# Patient Record
Sex: MALE | Race: WHITE | NOT HISPANIC OR LATINO | Employment: OTHER | URBAN - METROPOLITAN AREA
[De-identification: names, ages, dates, MRNs, and addresses within clinical notes are randomized per-mention and may not be internally consistent; named-entity substitution may affect disease eponyms.]

---

## 2017-01-17 ENCOUNTER — ALLSCRIPTS OFFICE VISIT (OUTPATIENT)
Dept: OTHER | Facility: OTHER | Age: 77
End: 2017-01-17

## 2017-01-26 ENCOUNTER — ANESTHESIA EVENT (OUTPATIENT)
Dept: PERIOP | Facility: HOSPITAL | Age: 77
End: 2017-01-26
Payer: COMMERCIAL

## 2017-02-02 ENCOUNTER — APPOINTMENT (OUTPATIENT)
Dept: PREADMISSION TESTING | Facility: HOSPITAL | Age: 77
End: 2017-02-02
Payer: COMMERCIAL

## 2017-02-02 ENCOUNTER — TRANSCRIBE ORDERS (OUTPATIENT)
Dept: ADMINISTRATIVE | Facility: HOSPITAL | Age: 77
End: 2017-02-02

## 2017-02-02 ENCOUNTER — APPOINTMENT (OUTPATIENT)
Dept: LAB | Facility: HOSPITAL | Age: 77
End: 2017-02-02
Attending: UROLOGY
Payer: COMMERCIAL

## 2017-02-02 ENCOUNTER — HOSPITAL ENCOUNTER (OUTPATIENT)
Dept: NON INVASIVE DIAGNOSTICS | Facility: HOSPITAL | Age: 77
Discharge: HOME | End: 2017-02-02
Attending: UROLOGY
Payer: COMMERCIAL

## 2017-02-02 VITALS
TEMPERATURE: 96.9 F | DIASTOLIC BLOOD PRESSURE: 83 MMHG | WEIGHT: 205.6 LBS | BODY MASS INDEX: 32.27 KG/M2 | HEIGHT: 67 IN | HEART RATE: 68 BPM | SYSTOLIC BLOOD PRESSURE: 161 MMHG | RESPIRATION RATE: 18 BRPM

## 2017-02-02 DIAGNOSIS — Z01.818 OTHER SPECIFIED PRE-OPERATIVE EXAMINATION: Primary | ICD-10-CM

## 2017-02-02 DIAGNOSIS — N32.89 NONTRAUMATIC RUPTURE OF BLADDER: ICD-10-CM

## 2017-02-02 DIAGNOSIS — Z01.818 OTHER SPECIFIED PRE-OPERATIVE EXAMINATION: ICD-10-CM

## 2017-02-02 LAB
ANION GAP SERPL CALCULATED.3IONS-SCNC: 8 MMOL/L (ref 4–13)
APTT PPP: 25 SECONDS (ref 24–36)
BASOPHILS # BLD AUTO: 0.01 THOUSANDS/ΜL (ref 0–0.1)
BASOPHILS NFR BLD AUTO: 0 % (ref 0–1)
BUN SERPL-MCNC: 19 MG/DL (ref 5–25)
CALCIUM SERPL-MCNC: 10.1 MG/DL (ref 8.3–10.1)
CHLORIDE SERPL-SCNC: 101 MMOL/L (ref 100–108)
CO2 SERPL-SCNC: 27 MMOL/L (ref 21–32)
CREAT SERPL-MCNC: 1.16 MG/DL (ref 0.6–1.3)
EOSINOPHIL # BLD AUTO: 0.07 THOUSAND/ΜL (ref 0–0.61)
EOSINOPHIL NFR BLD AUTO: 1 % (ref 0–6)
ERYTHROCYTE [DISTWIDTH] IN BLOOD BY AUTOMATED COUNT: 12.9 % (ref 11.6–15.1)
GFR SERPL CREATININE-BSD FRML MDRD: >60 ML/MIN/1.73SQ M
GLUCOSE SERPL-MCNC: 119 MG/DL (ref 65–140)
HCT VFR BLD AUTO: 45.9 % (ref 36.5–49.3)
HGB BLD-MCNC: 16.2 G/DL (ref 12–17)
INR PPP: 1.03 (ref 0.86–1.16)
LYMPHOCYTES # BLD AUTO: 1.06 THOUSANDS/ΜL (ref 0.6–4.47)
LYMPHOCYTES NFR BLD AUTO: 20 % (ref 14–44)
MCH RBC QN AUTO: 32.4 PG (ref 26.8–34.3)
MCHC RBC AUTO-ENTMCNC: 35.3 G/DL (ref 31.4–37.4)
MCV RBC AUTO: 92 FL (ref 82–98)
MONOCYTES # BLD AUTO: 0.42 THOUSAND/ΜL (ref 0.17–1.22)
MONOCYTES NFR BLD AUTO: 8 % (ref 4–12)
NEUTROPHILS # BLD AUTO: 3.78 THOUSANDS/ΜL (ref 1.85–7.62)
NEUTS SEG NFR BLD AUTO: 71 % (ref 43–75)
NRBC BLD AUTO-RTO: 0 /100 WBCS
PLATELET # BLD AUTO: 200 THOUSANDS/UL (ref 149–390)
PMV BLD AUTO: 10.9 FL (ref 8.9–12.7)
POTASSIUM SERPL-SCNC: 4.3 MMOL/L (ref 3.5–5.3)
PROTHROMBIN TIME: 13.5 SECONDS (ref 12–14.3)
RBC # BLD AUTO: 5 MILLION/UL (ref 3.88–5.62)
SODIUM SERPL-SCNC: 136 MMOL/L (ref 136–145)
WBC # BLD AUTO: 5.34 THOUSAND/UL (ref 4.31–10.16)

## 2017-02-02 PROCEDURE — 36415 COLL VENOUS BLD VENIPUNCTURE: CPT

## 2017-02-02 PROCEDURE — 85730 THROMBOPLASTIN TIME PARTIAL: CPT

## 2017-02-02 PROCEDURE — 93005 ELECTROCARDIOGRAM TRACING: CPT

## 2017-02-02 PROCEDURE — 85610 PROTHROMBIN TIME: CPT

## 2017-02-02 PROCEDURE — 85025 COMPLETE CBC W/AUTO DIFF WBC: CPT

## 2017-02-02 PROCEDURE — 80048 BASIC METABOLIC PNL TOTAL CA: CPT

## 2017-02-02 RX ORDER — AMLODIPINE BESYLATE 2.5 MG/1
2.5 TABLET ORAL
COMMUNITY
End: 2018-03-08 | Stop reason: SDUPTHER

## 2017-02-02 RX ORDER — BIOTIN 1 MG
1 TABLET ORAL DAILY
COMMUNITY
End: 2018-03-08 | Stop reason: ALTCHOICE

## 2017-02-02 RX ORDER — ACETAMINOPHEN 325 MG/1
650 TABLET ORAL EVERY 6 HOURS PRN
COMMUNITY

## 2017-02-02 RX ORDER — FINASTERIDE 5 MG/1
5 TABLET, FILM COATED ORAL EVERY MORNING
COMMUNITY
End: 2018-07-11 | Stop reason: SDUPTHER

## 2017-02-02 RX ORDER — VALSARTAN AND HYDROCHLOROTHIAZIDE 320; 25 MG/1; MG/1
1 TABLET, FILM COATED ORAL
COMMUNITY
End: 2018-03-08 | Stop reason: SDUPTHER

## 2017-02-02 RX ORDER — NITROGLYCERIN 0.4 MG/1
0.4 TABLET SUBLINGUAL
COMMUNITY
End: 2018-05-02 | Stop reason: SDUPTHER

## 2017-02-02 RX ORDER — TAMSULOSIN HYDROCHLORIDE 0.4 MG/1
0.4 CAPSULE ORAL 2 TIMES DAILY
COMMUNITY
End: 2018-04-02 | Stop reason: SDUPTHER

## 2017-02-02 RX ORDER — SODIUM CHLORIDE 9 MG/ML
125 INJECTION, SOLUTION INTRAVENOUS CONTINUOUS
Status: CANCELLED | OUTPATIENT
Start: 2017-02-02

## 2017-02-02 RX ORDER — ASPIRIN 325 MG
325 TABLET ORAL DAILY
COMMUNITY
End: 2017-02-08 | Stop reason: HOSPADM

## 2017-02-03 LAB
ATRIAL RATE: 63 BPM
P AXIS: 47 DEGREES
PR INTERVAL: 164 MS
QRS AXIS: -55 DEGREES
QRSD INTERVAL: 106 MS
QT INTERVAL: 400 MS
QTC INTERVAL: 409 MS
T WAVE AXIS: -6 DEGREES
VENTRICULAR RATE: 63 BPM

## 2017-02-08 ENCOUNTER — ANESTHESIA (OUTPATIENT)
Dept: PERIOP | Facility: HOSPITAL | Age: 77
End: 2017-02-08
Payer: COMMERCIAL

## 2017-02-08 ENCOUNTER — HOSPITAL ENCOUNTER (OUTPATIENT)
Facility: HOSPITAL | Age: 77
Setting detail: OUTPATIENT SURGERY
Discharge: HOME | End: 2017-02-08
Attending: UROLOGY | Admitting: UROLOGY
Payer: COMMERCIAL

## 2017-02-08 VITALS
RESPIRATION RATE: 16 BRPM | OXYGEN SATURATION: 97 % | HEIGHT: 67 IN | SYSTOLIC BLOOD PRESSURE: 132 MMHG | WEIGHT: 205 LBS | DIASTOLIC BLOOD PRESSURE: 68 MMHG | BODY MASS INDEX: 32.18 KG/M2 | HEART RATE: 60 BPM | TEMPERATURE: 97.8 F

## 2017-02-08 DIAGNOSIS — N32.89 OTHER SPECIFIED DISORDERS OF BLADDER: ICD-10-CM

## 2017-02-08 PROCEDURE — 88305 TISSUE EXAM BY PATHOLOGIST: CPT | Performed by: UROLOGY

## 2017-02-08 PROCEDURE — A9270 NON-COVERED ITEM OR SERVICE: HCPCS | Performed by: UROLOGY

## 2017-02-08 RX ORDER — SODIUM CHLORIDE 9 MG/ML
125 INJECTION, SOLUTION INTRAVENOUS CONTINUOUS
Status: DISCONTINUED | OUTPATIENT
Start: 2017-02-08 | End: 2017-02-08 | Stop reason: HOSPADM

## 2017-02-08 RX ORDER — MITOMYCIN 40 MG/80ML
40 INJECTION, POWDER, LYOPHILIZED, FOR SOLUTION INTRAVENOUS ONCE
Status: DISCONTINUED | OUTPATIENT
Start: 2017-02-08 | End: 2017-02-08

## 2017-02-08 RX ORDER — MAGNESIUM HYDROXIDE 1200 MG/15ML
LIQUID ORAL AS NEEDED
Status: DISCONTINUED | OUTPATIENT
Start: 2017-02-08 | End: 2017-02-08 | Stop reason: HOSPADM

## 2017-02-08 RX ORDER — FENTANYL CITRATE 50 UG/ML
INJECTION, SOLUTION INTRAMUSCULAR; INTRAVENOUS AS NEEDED
Status: DISCONTINUED | OUTPATIENT
Start: 2017-02-08 | End: 2017-02-08 | Stop reason: SURG

## 2017-02-08 RX ORDER — TAMSULOSIN HYDROCHLORIDE 0.4 MG/1
0.4 CAPSULE ORAL ONCE
Status: COMPLETED | OUTPATIENT
Start: 2017-02-08 | End: 2017-02-08

## 2017-02-08 RX ORDER — ONDANSETRON 2 MG/ML
INJECTION INTRAMUSCULAR; INTRAVENOUS AS NEEDED
Status: DISCONTINUED | OUTPATIENT
Start: 2017-02-08 | End: 2017-02-08 | Stop reason: SURG

## 2017-02-08 RX ORDER — FENTANYL CITRATE/PF 50 MCG/ML
50 SYRINGE (ML) INJECTION
Status: DISCONTINUED | OUTPATIENT
Start: 2017-02-08 | End: 2017-02-08 | Stop reason: HOSPADM

## 2017-02-08 RX ORDER — PHENAZOPYRIDINE HYDROCHLORIDE 100 MG/1
100 TABLET, FILM COATED ORAL 3 TIMES DAILY PRN
Qty: 10 TABLET | Refills: 0 | Status: SHIPPED | OUTPATIENT
Start: 2017-02-08 | End: 2018-03-08 | Stop reason: CLARIF

## 2017-02-08 RX ORDER — LIDOCAINE HYDROCHLORIDE 10 MG/ML
INJECTION, SOLUTION INFILTRATION; PERINEURAL AS NEEDED
Status: DISCONTINUED | OUTPATIENT
Start: 2017-02-08 | End: 2017-02-08 | Stop reason: SURG

## 2017-02-08 RX ORDER — PROPOFOL 10 MG/ML
INJECTION, EMULSION INTRAVENOUS AS NEEDED
Status: DISCONTINUED | OUTPATIENT
Start: 2017-02-08 | End: 2017-02-08 | Stop reason: SURG

## 2017-02-08 RX ORDER — MIDAZOLAM HYDROCHLORIDE 1 MG/ML
INJECTION INTRAMUSCULAR; INTRAVENOUS AS NEEDED
Status: DISCONTINUED | OUTPATIENT
Start: 2017-02-08 | End: 2017-02-08 | Stop reason: SURG

## 2017-02-08 RX ORDER — PHENAZOPYRIDINE HYDROCHLORIDE 100 MG/1
100 TABLET, FILM COATED ORAL ONCE
Status: COMPLETED | OUTPATIENT
Start: 2017-02-08 | End: 2017-02-08

## 2017-02-08 RX ORDER — MITOMYCIN 40 MG/80ML
40 INJECTION, POWDER, LYOPHILIZED, FOR SOLUTION INTRAVENOUS ONCE
Status: COMPLETED | OUTPATIENT
Start: 2017-02-08 | End: 2017-02-08

## 2017-02-08 RX ORDER — GLYCINE 1.5 G/100ML
SOLUTION IRRIGATION AS NEEDED
Status: DISCONTINUED | OUTPATIENT
Start: 2017-02-08 | End: 2017-02-08 | Stop reason: HOSPADM

## 2017-02-08 RX ORDER — ONDANSETRON 2 MG/ML
4 INJECTION INTRAMUSCULAR; INTRAVENOUS ONCE
Status: DISCONTINUED | OUTPATIENT
Start: 2017-02-08 | End: 2017-02-08 | Stop reason: HOSPADM

## 2017-02-08 RX ADMIN — TAMSULOSIN HYDROCHLORIDE 0.4 MG: 0.4 CAPSULE ORAL at 12:37

## 2017-02-08 RX ADMIN — PHENAZOPYRIDINE HYDROCHLORIDE 100 MG: 100 TABLET ORAL at 12:37

## 2017-02-08 RX ADMIN — FENTANYL CITRATE 25 MCG: 50 INJECTION, SOLUTION INTRAMUSCULAR; INTRAVENOUS at 10:19

## 2017-02-08 RX ADMIN — FENTANYL CITRATE 25 MCG: 50 INJECTION, SOLUTION INTRAMUSCULAR; INTRAVENOUS at 10:22

## 2017-02-08 RX ADMIN — CEFAZOLIN SODIUM 2000 MG: 2 SOLUTION INTRAVENOUS at 10:13

## 2017-02-08 RX ADMIN — PROPOFOL 150 MG: 10 INJECTION, EMULSION INTRAVENOUS at 10:19

## 2017-02-08 RX ADMIN — ONDANSETRON HYDROCHLORIDE 4 MG: 2 INJECTION, SOLUTION INTRAVENOUS at 10:40

## 2017-02-08 RX ADMIN — LIDOCAINE HYDROCHLORIDE 60 MG: 10 INJECTION, SOLUTION INFILTRATION; PERINEURAL at 10:13

## 2017-02-08 RX ADMIN — MIDAZOLAM HYDROCHLORIDE 2 MG: 1 INJECTION, SOLUTION INTRAMUSCULAR; INTRAVENOUS at 10:12

## 2017-02-08 RX ADMIN — SODIUM CHLORIDE 125 ML/HR: 0.9 INJECTION, SOLUTION INTRAVENOUS at 09:22

## 2017-02-08 RX ADMIN — SODIUM CHLORIDE 125 ML/HR: 0.9 INJECTION, SOLUTION INTRAVENOUS at 11:11

## 2017-02-24 ENCOUNTER — ALLSCRIPTS OFFICE VISIT (OUTPATIENT)
Dept: OTHER | Facility: OTHER | Age: 77
End: 2017-02-24

## 2017-05-17 ENCOUNTER — GENERIC CONVERSION - ENCOUNTER (OUTPATIENT)
Dept: OTHER | Facility: OTHER | Age: 77
End: 2017-05-17

## 2017-07-19 ENCOUNTER — ALLSCRIPTS OFFICE VISIT (OUTPATIENT)
Dept: OTHER | Facility: OTHER | Age: 77
End: 2017-07-19

## 2017-08-01 ENCOUNTER — LAB CONVERSION - ENCOUNTER (OUTPATIENT)
Dept: OTHER | Facility: OTHER | Age: 77
End: 2017-08-01

## 2017-08-01 ENCOUNTER — GENERIC CONVERSION - ENCOUNTER (OUTPATIENT)
Dept: OTHER | Facility: OTHER | Age: 77
End: 2017-08-01

## 2017-08-01 LAB
A/G RATIO (HISTORICAL): 1.7 (CALC) (ref 1–2.5)
ALBUMIN SERPL BCP-MCNC: 3.9 G/DL (ref 3.6–5.1)
ALP SERPL-CCNC: 38 U/L (ref 40–115)
ALT SERPL W P-5'-P-CCNC: 11 U/L (ref 9–46)
AST SERPL W P-5'-P-CCNC: 11 U/L (ref 10–35)
BASOPHILS # BLD AUTO: 0.6 %
BASOPHILS # BLD AUTO: 28 CELLS/UL (ref 0–200)
BILIRUB SERPL-MCNC: 0.5 MG/DL (ref 0.2–1.2)
BUN SERPL-MCNC: 22 MG/DL (ref 7–25)
BUN/CREA RATIO (HISTORICAL): ABNORMAL (CALC) (ref 6–22)
CALCIUM SERPL-MCNC: 9.8 MG/DL (ref 8.6–10.3)
CHLORIDE SERPL-SCNC: 105 MMOL/L (ref 98–110)
CHOLEST SERPL-MCNC: 169 MG/DL (ref 125–200)
CHOLEST/HDLC SERPL: 3.4 (CALC)
CO2 SERPL-SCNC: 28 MMOL/L (ref 20–31)
CREAT SERPL-MCNC: 1.13 MG/DL (ref 0.7–1.18)
DEPRECATED RDW RBC AUTO: 12.6 % (ref 11–15)
EGFR AFRICAN AMERICAN (HISTORICAL): 73 ML/MIN/1.73M2
EGFR-AMERICAN CALC (HISTORICAL): 63 ML/MIN/1.73M2
EOSINOPHIL # BLD AUTO: 1.3 %
EOSINOPHIL # BLD AUTO: 61 CELLS/UL (ref 15–500)
GAMMA GLOBULIN (HISTORICAL): 2.3 G/DL (CALC) (ref 1.9–3.7)
GLUCOSE (HISTORICAL): 108 MG/DL (ref 65–99)
HBA1C MFR BLD HPLC: 5.7 % OF TOTAL HGB
HCT VFR BLD AUTO: 46.1 % (ref 38.5–50)
HDLC SERPL-MCNC: 49 MG/DL
HGB BLD-MCNC: 15.4 G/DL (ref 13.2–17.1)
LDL CHOLESTEROL (HISTORICAL): 104 MG/DL (CALC)
LYMPHOCYTES # BLD AUTO: 1025 CELLS/UL (ref 850–3900)
LYMPHOCYTES # BLD AUTO: 21.8 %
MCH RBC QN AUTO: 31.4 PG (ref 27–33)
MCHC RBC AUTO-ENTMCNC: 33.4 G/DL (ref 32–36)
MCV RBC AUTO: 94.1 FL (ref 80–100)
MONOCYTES # BLD AUTO: 381 CELLS/UL (ref 200–950)
MONOCYTES (HISTORICAL): 8.1 %
NEUTROPHILS # BLD AUTO: 3205 CELLS/UL (ref 1500–7800)
NEUTROPHILS # BLD AUTO: 68.2 %
NON-HDL-CHOL (CHOL-HDL) (HISTORICAL): 120 MG/DL (CALC)
PLATELET # BLD AUTO: 213 THOUSAND/UL (ref 140–400)
PMV BLD AUTO: 10.3 FL (ref 7.5–12.5)
POTASSIUM SERPL-SCNC: 4.4 MMOL/L (ref 3.5–5.3)
PROSTATE SPECIFIC ANTIGEN TOTAL (HISTORICAL): 0.7 NG/ML
RBC # BLD AUTO: 4.9 MILLION/UL (ref 4.2–5.8)
SODIUM SERPL-SCNC: 138 MMOL/L (ref 135–146)
TOTAL PROTEIN (HISTORICAL): 6.2 G/DL (ref 6.1–8.1)
TRIGL SERPL-MCNC: 81 MG/DL
TSH SERPL DL<=0.05 MIU/L-ACNC: 0.96 MIU/L (ref 0.4–4.5)
WBC # BLD AUTO: 4.7 THOUSAND/UL (ref 3.8–10.8)

## 2017-08-30 ENCOUNTER — ALLSCRIPTS OFFICE VISIT (OUTPATIENT)
Dept: OTHER | Facility: OTHER | Age: 77
End: 2017-08-30

## 2017-09-14 ENCOUNTER — GENERIC CONVERSION - ENCOUNTER (OUTPATIENT)
Dept: OTHER | Facility: OTHER | Age: 77
End: 2017-09-14

## 2017-09-14 ENCOUNTER — ALLSCRIPTS OFFICE VISIT (OUTPATIENT)
Dept: OTHER | Facility: OTHER | Age: 77
End: 2017-09-14

## 2017-11-21 ENCOUNTER — ALLSCRIPTS OFFICE VISIT (OUTPATIENT)
Dept: OTHER | Facility: OTHER | Age: 77
End: 2017-11-21

## 2017-11-22 NOTE — PROGRESS NOTES
Assessment    1  Right ear pain (388 70) (H92 01)   2  Benign essential hypertension (401 1) (I10)    Plan  Right ear pain    · Amoxicillin 875 MG Oral Tablet; TAKE 1 TABLET EVERY 12 HOURS DAILY   · Fluticasone Propionate 50 MCG/ACT Nasal Suspension; 2 sprays each nostril daily    Discussion/Summary    78y/o male here to for right ear pain, chronic sinus pressure  right ear exam limited with thin sheet of dried cerumen but questionable infection with visible inferior border  I will place patient on amoxicillin antibiotics for 10 days at 875 mg b i d  Unfortunately we were a little restricted with what we can treat with for inner ear pressure as he has hypertension and I wish to avoid decongestants and we need to avoid antihistamines with his prostate/urinary issues  I will have him start fluticasone nasal spray daily and see how he does  Tylenol as needed  He can try using a few drops of 1-1 ratio peroxide/warm water in his right ear to loosen the small amount of cerumen but nothing is to go in his ear otherwise  He is to follow up in a week or so for recheck if symptoms persist   blood pressure is a little elevated today at 160/90 which is uncharacteristic for him  He states he forgot to take his blood pressure medication  He does have a cuff at home and states that he will check pressures and call if it remains elevated otherwise he has an appointment in 3 weeks with Cardiology  Possible side effects of new medications were reviewed with the patient/guardian today  The treatment plan was reviewed with the patient/guardian  The patient/guardian understands and agrees with the treatment plan      Chief Complaint  pt complains of some right ear discomfort that started Thursday of last week  States he was pulling weeds and some dirt get into his ear  History of Present Illness  HPI: 78y/o male here today for right ear pain for past week  states was blowing leaves and thinks something got in there   no hearing change  denies URI sxs but states always has sinus problems/congestion/pressure  Review of Systems   Constitutional: no fever or chills, feels well, no tiredness, no recent weight loss or weight gain,-- no fever-- and-- no chills  ENT: as noted in HPI  Neurological: no complaints of headache, no confusion, no numbness or tingling, no dizziness or fainting  Active Problems  1  Arteriosclerosis of coronary artery (414 00) (I25 10)   2  Benign essential hypertension (401 1) (I10)   3  Benign prostatic hypertrophy (600 00) (N40 0)   4  Bladder calculus (594 1) (N21 0)   5  Bladder mass (596 89) (N32 89)   6  DM2 (diabetes mellitus, type 2) (250 00) (E11 9)   7  Hyperlipidemia (272 4) (E78 5)   8  Incomplete emptying of bladder (788 21) (R33 9)   9  Lesion of bladder (596 9) (N32 9)   10  Osteoarthritis of right hip (715 95) (M16 11)   11  Primary insomnia (307 42) (F51 01)   12  Seborrheic keratoses, inflamed (702 11) (L82 0)   13  Symptomatic bradycardia (427 89) (R00 1)   14  Vitamin D deficiency (268 9) (E55 9)    Past Medical History  1  History of Abnormal blood chemistry (790 6) (R79 9)   2  History of Abnormal glucose (790 29) (R73 09)   3  History of Acute knee pain (719 46) (M25 569)   4  History of Acute UTI (599 0) (N39 0)   5  History of Colon cancer screening (V76 51) (Z12 11)   6  History of Depression screening (V79 0) (Z13 89)   7  History of Encounter for prostate cancer screening (V76 44) (Z12 5)   8  History of Encounter for prostate cancer screening (V76 44) (Z12 5)   9  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)   10  History of Fatigue (780 79) (R53 83)   11  History of Hematuria, microscopic (599 72) (R31 29)   12  History of Need for immunization against influenza (V04 81) (Z23)   13  History of Pain in joint of right hip (719 45) (M25 551)   14  History of Preop cardiovascular exam (V72 81) (Z01 810)   15   History of Preop examination (V72 84) (Z01 818) 16  History of Pre-op examination (V72 84) (Z01 818)   17  History of Primary insomnia (307 42) (F51 01)   18  History of Recurrent right inguinal hernia (550 91) (K40 91)   19  History of Right groin pain (789 09) (R10 31)   20  History of Right inguinal hernia (550 90) (K40 90)   21  History of Urinary urgency (788 63) (R39 15)    Family History  Mother    1  Family history of Diabetes  Brother    2  Family history of Diabetes  Other    3  Family history of bipolar disorder (V17 0) (Z81 8)  Family History    4  Family history of diabetes mellitus (V18 0) (Z83 3)   5  Family history of hypertension (V17 49) (Z82 49)    Social History   · Denied: History of Illicit drug use   · Minimum alcohol consumption   · Never a smoker  The social history was reviewed and updated today  Surgical History    1  History of Cardiac Cath Procedure Outcome:   2  History of Cystoscopy With Cystolitholapaxy   3  History of Hip Replacement   4  History of Inguinal Hernia Repair - Recurrent   5  History of Knee Replacement    Current Meds   1  AmLODIPine Besylate 2 5 MG Oral Tablet; take 1 tablet by mouth once daily; Therapy: 74EZF4083 to (Evaluate:11Oct2017)  Requested for: 11PMQ1213; Last Rx:69Sfx9526 Ordered   2  Aspirin  MG Oral Tablet Delayed Release; TAKE 1 TABLET; Therapy: 19WYN9384 to Recorded   3  Finasteride 5 MG Oral Tablet; take 1 tablet by mouth once daily; Therapy: 87OOZ9316 to ([phone])  Requested for: 50SGV1381; Last Rx:39Adl4470 Ordered   4  Nitrostat 0 4 MG Sublingual Tablet Sublingual; DISSOLVE 1 TABLET UNDER THE TONGUE AS NEEDED FOR CHEST PAIN; Therapy: 98HEV2763 to (Evaluate:14Mar2016)  Requested for: 07UCX5936; Last Rx:19Dbt8718 Ordered   5  Omega 3 CAPS; Therapy: (Recorded:58Bmw2686) to Recorded   6  Red Yeast Rice 600 MG Oral Capsule; one tablet bid; Therapy: 02ZDP0189 to Recorded   7  Tamsulosin HCl - 0 4 MG Oral Capsule; Take one capsule twice a day;  Therapy: 94QFH9068 to (HTAWGEQM:31UUT6135)  Requested for: 07KEH2357; Last Rx:11Oct2017 Ordered   8  Valsartan-Hydrochlorothiazide 320-25 MG Oral Tablet; take 1 tablet by mouth once daily; Therapy: 34LPH2589 to (Yomaira Ibanez)  Requested for: 29OUE0026; Last Rx:10Oct2017 Ordered   9  Vitamin D 1000 UNIT Oral Tablet; Take as directed Recorded    The medication list was reviewed and updated today  Allergies  1  No Known Drug Allergies    Vitals   Recorded: 21Nov2017 10:19AM   Temperature 96 9 F, Tympanic   Heart Rate 52   Pulse Quality Normal   Respiration Quality Normal   Respiration 17   Systolic 506, LUE, Sitting   Diastolic 90, LUE, Sitting   Height 5 ft 7 in   Weight 203 lb    BMI Calculated 31 79   BSA Calculated 2 03   O2 Saturation 98   Pain Scale 0       Physical Exam   Constitutional  General appearance: No acute distress, well appearing and well nourished  appears healthy,-- comfortable-- and-- appearance reflects stated age  Eyes  Conjunctiva and lids: No swelling, erythema, or discharge  Ears, Nose, Mouth, and Throat  External inspection of ears and nose: Normal    Otoscopic examination: Abnormal  -- right ear canal without redness, swelling or FB  few small flakes of thin cerumen not obstructing but limiting view of TM  inferior border of TM appearing dull, red  left side appearing normal   Nasal mucosa, septum, and turbinates: Normal without edema or erythema  Oropharynx: Normal with no erythema, edema, exudate or lesions  Lymphatic  Palpation of lymph nodes in neck: No lymphadenopathy  Psychiatric  Orientation to person, place and time: Normal    Mood and affect: Normal    Additional Exam:  vitals reviewed - BP elevated  Future Appointments    Date/Time Provider Specialty Site   12/19/2017 11:20 AM BRYANT Law  Cardiology [facility]   09/18/2018 01:45 PM BRYANT Leon   Urology [facility] END       Signatures   Electronically signed by : Lynne Wilder PAC; Nov 21 2017 10:49AM EST                       (Author)    Electronically signed by :  Susie Ramirez DO; Nov 21 2017  9:32PM EST                       (Author)

## 2017-12-19 ENCOUNTER — ALLSCRIPTS OFFICE VISIT (OUTPATIENT)
Dept: OTHER | Facility: OTHER | Age: 77
End: 2017-12-19

## 2017-12-20 NOTE — PROGRESS NOTES
Assessment  Assessed    1  Arteriosclerosis of coronary artery (414 00) (I25 10)   2  Benign essential hypertension (401 1) (I10)   3  Hyperlipidemia (272 4) (E78 5)    Plan  Arteriosclerosis of coronary artery    · ECHO STRESS TEST W CONTRAST IF INDICATED; Status:Need Information - FinancialAuthorization; Requested for:[id number]; Perform:[facility]; ULW:[id number];LTWGUZP; For:Arteriosclerosis of coronary artery; Ordered By:Osito Ibsell;  Benign essential hypertension, Hyperlipidemia    · EKG/ECG- POC; Status:Complete;   Done: 22KON8743 11:20AM   Perform: In Office; Last Updated By:Brinda Gore; 12/19/2017 11:20:32 AM;Ordered; For:Benign essential hypertension, Hyperlipidemia; Ordered By:Violet Isbell;    Discussion/Summary  Cardiology Discussion Summary Free Text Note Form [facility]: It is my impression that the patient is doing well the diagnosis of CAD s/p remote stenting x2  He has no angina and had a negative stress test 2015  His BP was good today and he will continue amlodipine and valsartan/HCTZ  He does take red rice yeast which is a weak statin  His LDL was 104 on this earlier this year  Lori Adler He did have atypical side effects from statins and wishes to continue with the red rice yeast  He will continue ASA long term  I will see him again in one years time and do a stress echo at that time  Chief Complaint  Chief Complaint Free Text Note Form: One year FU for CAD s/p stenting of the RCA in 2003 and circumflex in 2004  Lori Adler also history of HTN and HPL   Chief Complaint Chronic Condition [facility]: Patient is here today for follow up of chronic conditions described in HPI  History of Present Illness  Cardiology HPI Free Text Note Form [facility]: Since his last visit he denies chest pain  He does get some CHAN with effort but this is not increased  He denies palpitations or lightheadedness   He states his edema is minimal       Review of Systems  Cardiology Male ROS:    Cardiac: has swelling in the LEs, but-- no chest pain,-- no rhythm problems,-- no fainting/blackouts,-- no heart murmur present,-- no palpitations present,-- no syncope/fainting,-- no AM fatigue-- and-- no witnessed apnea episodes  Skin: No complaints of nonhealing sores or skin rash  Genitourinary: frequent urination at night, but-- No complaints of recurrent urinary tract infections, frequent urination at night, difficult urination, blood in urine, kidney stones, loss of bladder control, no kidney or prostate problems, no erectile dysfunction  ,-- no recurrent urinary tract infections,-- no difficult urination,-- no blood in urine,-- no kidney stones,-- no loss of bladder control,-- no kidney problems,-- no prostate problems-- and-- no erectile dysfunction  Psychological: No complaints of feeling depressed, anxiety, panic attacks, or difficulty concentrating  General: trouble sleeping-- and-- lack of energy/fatigue, but-- no appetite changes,-- no changes in weight,-- no fever,-- no night sweats-- and-- no frequent infections  Respiratory: shortness of breath, but-- no cough/sputum,-- no wheezing,-- no phlegm-- and-- no hemoptysis  HEENT: No complaints of serious problems, hearing problems, nose problems, throat problems, or snoring  Gastrointestinal: No complaints of liver problems, nausea, vomiting, heartburn, constipation, bloody stools, diarrhea, problems swallowing, adbominal pain, or rectal bleeding  Hematologic: No complaints of bleeding disorders, anemia, blood clots, or excessive brusing  Neurological: numbnes, but-- no tingling,-- no weakness,-- no seizures,-- no headaches,-- no dizziness,-- no diplopia-- and-- no daytime sleepiness  Musculoskeletal: arthritis, but-- no back pain-- and-- no swelling/pain   ROS Reviewed:   ROS reviewed  Active Problems  Problems    1  Arteriosclerosis of coronary artery (414 00) (I25 10)   2  Benign essential hypertension (401 1) (I10)   3  Benign prostatic hypertrophy (600 00) (N40 0)   4  Bladder calculus (594 1) (N21 0)   5  Bladder mass (596 89) (N32 89)   6  DM2 (diabetes mellitus, type 2) (250 00) (E11 9)   7  Hyperlipidemia (272 4) (E78 5)   8  Incomplete emptying of bladder (788 21) (R33 9)   9  Lesion of bladder (596 9) (N32 9)   10  Osteoarthritis of right hip (715 95) (M16 11)   11  Primary insomnia (307 42) (F51 01)   12  Right ear pain (388 70) (H92 01)   13  Seborrheic keratoses, inflamed (702 11) (L82 0)   14  Symptomatic bradycardia (427 89) (R00 1)   15  Vitamin D deficiency (268 9) (E55 9)    Past Medical History  Problems    1  History of Abnormal blood chemistry (790 6) (R79 9)   2  History of Abnormal glucose (790 29) (R73 09)   3  History of Acute knee pain (719 46) (M25 569)   4  History of Acute UTI (599 0) (N39 0)   5  History of Colon cancer screening (V76 51) (Z12 11)   6  History of Depression screening (V79 0) (Z13 89)   7  History of Encounter for prostate cancer screening (V76 44) (Z12 5)   8  History of Encounter for prostate cancer screening (V76 44) (Z12 5)   9  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)   10  History of Fatigue (780 79) (R53 83)   11  History of Hematuria, microscopic (599 72) (R31 29)   12  History of influenza vaccination (V49 89) (Z92 29)   13  History of Need for immunization against influenza (V04 81) (Z23)   14  History of Pain in joint of right hip (719 45) (M25 551)   15  History of Preop cardiovascular exam (V72 81) (Z01 810)   16  History of Preop examination (V72 84) (Z01 818)   17  History of Pre-op examination (V72 84) (Z01 818)   18  History of Primary insomnia (307 42) (F51 01)   19  History of Recurrent right inguinal hernia (550 91) (K40 91)   20  History of Right groin pain (789 09) (R10 31)   21  History of Right inguinal hernia (550 90) (K40 90)   22  History of Urinary urgency (788 63) (R39 15)  Active Problems And Past Medical History Reviewed:    The active problems and past medical history were reviewed and updated today  Surgical History  Problems    1  History of Cardiac Cath Procedure Outcome:   2  History of Cystoscopy With Cystolitholapaxy   3  History of Hip Replacement   4  History of Inguinal Hernia Repair - Recurrent   5  History of Knee Replacement  Surgical History Reviewed: The surgical history was reviewed and updated today  Family History  Mother    1  Family history of Diabetes   2  Denied: Family history of substance abuse  Father    3  Denied: Family history of substance abuse  Brother    4  Family history of Diabetes  Other    5  Family history of bipolar disorder (V17 0) (Z81 8)  Family History    6  Family history of diabetes mellitus (V18 0) (Z83 3)   7  Family history of hypertension (V17 49) (Z82 49)  Family History Reviewed: The family history was reviewed and updated today  Social History  Problems    · Always uses seat belt   · Daily caffeine consumption, 2-3 servings a day   · Feels safe at home   · Denied: History of Illicit drug use   · Minimum alcohol consumption   · Never a smoker   · Retired  Social History Reviewed: The social history was reviewed and updated today  The social history was reviewed and is unchanged  Current Meds   1  AmLODIPine Besylate 2 5 MG Oral Tablet; take 1 tablet by mouth once daily; Therapy: 73ZDL1719 to (Evaluate:11Oct2017)  Requested for: 21IOY3778; Last Rx:69Vfa2610 Ordered   2  Aspirin  MG Oral Tablet Delayed Release; TAKE 1 TABLET; Therapy: 85NIW8635 to Recorded   3  Finasteride 5 MG Oral Tablet; take 1 tablet by mouth once daily; Therapy: 73JST5063 to (Eddie Quevedo)  Requested for: 80DKC6642; Last Rx:10Oct2017 Ordered   4  Fluticasone Propionate 50 MCG/ACT Nasal Suspension; 2 sprays each nostril daily; Therapy: 23DNA8302 to (Last Rx:21Nov2017)  Requested for: 21Nov2017 Ordered   5   Nitrostat 0 4 MG Sublingual Tablet Sublingual; DISSOLVE 1 TABLET UNDER THE TONGUE AS NEEDED FOR CHEST PAIN; Therapy: 36XBG4519 to (Evaluate:2016)  Requested for: 52Ezc0946; Last Rx:36Bdw3836 Ordered   6  Omega 3 CAPS; Therapy: (Recorded:02Xsf6465) to Recorded   7  Red Yeast Rice 600 MG Oral Capsule; one tablet bid; Therapy: 49SZW3396 to Recorded   8  Tamsulosin HCl - 0 4 MG Oral Capsule; Take one capsule twice a day; Therapy: 38JWP7167 to ([phone])  Requested for: 94JWE9811; Last Rx:81Ovb2938 Ordered   9  Valsartan-Hydrochlorothiazide 320-25 MG Oral Tablet; take 1 tablet by mouth once daily; Therapy: 07MEH8137 to (Sarah Clarkegar)  Requested for: 27HKD3131; Last Rx:2017 Ordered   10  Vitamin D 1000 UNIT Oral Tablet; Take as directed Recorded  Medication List Reviewed: The medication list was reviewed and updated today  Allergies  Medication    1  No Known Drug Allergies    Vitals  Vital Signs    Recorded: 81JLJ0374 11:30AM   Heart Rate 75   Systolic 934   Diastolic 64   Weight 492 lb    BMI Calculated 31 79   BSA Calculated 2 03     Physical Exam   Constitutional  General appearance: Abnormal  -- obese elderly white male in NAD  Eyes  Conjunctiva and Sclera examination: Conjunctiva pink, sclera anicteric  Ears, Nose, Mouth, and Throat - Oropharynx: Clear, nares are clear, mucous membranes are moist   Neck  Neck and thyroid: Normal, supple, trachea midline, no thyromegaly  Pulmonary  Auscultation of lungs: Clear to auscultation, no rales, no rhonchi, no wheezing, good air movement  Cardiovascular  Auscultation of heart: Normal rate and rhythm, normal S1 and S2, no murmurs  -- occasional premature beats  Carotid pulses: Normal, 2+ bilaterally  Pedal pulses: Normal, 2+ bilaterally  Examination of extremities for edema and/or varicosities: Normal    Chest - Chest: Normal   Abdomen  Abdomen: Non-tender and no distention  Liver and spleen: No hepatomegaly or splenomegaly  Musculoskeletal Gait and station: Normal gait  -- Digits and nails: Normal without clubbing or cyanosis  -- Inspection/palpation of joints, bones, and muscles: Normal, ROM normal    Skin - Skin and subcutaneous tissue: Normal without rashes or lesions  Skin is warm and well perfused, normal turgor  Neurologic - Cranial nerves: II - XII intact  -- Sensation: No sensory loss  -- no focal motor findings  Psychiatric - Orientation to person, place, and time: Normal -- Mood and affect: Normal       Health Management  History of Colon cancer screening   COLONOSCOPY; every 5 years; Last 37WID6640; Next Due: 38YNA1744; Active  Health Maintenance   Medicare Annual Wellness Visit; every 1 year; Next Due: 35Tid5001; Overdue    Future Appointments    Date/Time Provider Specialty Site   09/18/2018 01:45 PM BRYANT Esparza   [facility]     Signatures   Electronically signed by : BRYANT Vang ; Dec 19 2017 11:55AM EST                       (Author)

## 2018-01-12 VITALS
DIASTOLIC BLOOD PRESSURE: 92 MMHG | HEIGHT: 67 IN | TEMPERATURE: 96.6 F | BODY MASS INDEX: 31.25 KG/M2 | OXYGEN SATURATION: 98 % | WEIGHT: 199.13 LBS | HEART RATE: 60 BPM | SYSTOLIC BLOOD PRESSURE: 156 MMHG

## 2018-01-13 VITALS
SYSTOLIC BLOOD PRESSURE: 128 MMHG | BODY MASS INDEX: 31.75 KG/M2 | HEIGHT: 67 IN | DIASTOLIC BLOOD PRESSURE: 72 MMHG | HEART RATE: 68 BPM | WEIGHT: 202.31 LBS

## 2018-01-13 VITALS
SYSTOLIC BLOOD PRESSURE: 160 MMHG | TEMPERATURE: 96.9 F | DIASTOLIC BLOOD PRESSURE: 90 MMHG | BODY MASS INDEX: 31.86 KG/M2 | WEIGHT: 203 LBS | HEIGHT: 67 IN | OXYGEN SATURATION: 98 % | RESPIRATION RATE: 17 BRPM | HEART RATE: 52 BPM

## 2018-01-13 VITALS
HEIGHT: 67 IN | SYSTOLIC BLOOD PRESSURE: 130 MMHG | BODY MASS INDEX: 31.94 KG/M2 | DIASTOLIC BLOOD PRESSURE: 84 MMHG | HEART RATE: 68 BPM | WEIGHT: 203.5 LBS

## 2018-01-13 VITALS
SYSTOLIC BLOOD PRESSURE: 130 MMHG | TEMPERATURE: 95.6 F | HEART RATE: 66 BPM | OXYGEN SATURATION: 95 % | DIASTOLIC BLOOD PRESSURE: 82 MMHG | WEIGHT: 195.06 LBS | HEIGHT: 67 IN | BODY MASS INDEX: 30.62 KG/M2

## 2018-01-14 NOTE — RESULT NOTES
Verified Results  (1) CBC/PLT/DIFF 42VDU0853 11:38AM Navneet White     Test Name Result Flag Reference   WHITE BLOOD CELL COUNT 4 7 Thousand/uL  3 8-10 8   RED BLOOD CELL COUNT 4 90 Million/uL  4 20-5 80   HEMOGLOBIN 15 4 g/dL  13 2-17 1   HEMATOCRIT 46 1 %  38 5-50 0   MCV 94 1 fL  80 0-100 0   MCH 31 4 pg  27 0-33 0   MCHC 33 4 g/dL  32 0-36 0   RDW 12 6 %  11 0-15 0   PLATELET COUNT 076 Thousand/uL  140-400   ABSOLUTE NEUTROPHILS 3205 cells/uL  9234-8031   ABSOLUTE LYMPHOCYTES 1025 cells/uL  850-3900   ABSOLUTE MONOCYTES 381 cells/uL  200-950   ABSOLUTE EOSINOPHILS 61 cells/uL     ABSOLUTE BASOPHILS 28 cells/uL  0-200   NEUTROPHILS 68 2 %     LYMPHOCYTES 21 8 %     MONOCYTES 8 1 %     EOSINOPHILS 1 3 %     BASOPHILS 0 6 %     MPV 10 3 fL  7 5-12 5     (1) COMPREHENSIVE METABOLIC PANEL 99ZZM1588 29:50QT Navneet Coranais     Test Name Result Flag Reference   GLUCOSE 108 mg/dL H 65-99   Fasting reference interval     For someone without known diabetes, a glucose value  between 100 and 125 mg/dL is consistent with  prediabetes and should be confirmed with a  follow-up test    UREA NITROGEN (BUN) 22 mg/dL  7-25   CREATININE 1 13 mg/dL  0 70-1 18   For patients >52years of age, the reference limit  for Creatinine is approximately 13% higher for people  identified as -American  eGFR NON-AFR   AMERICAN 63 mL/min/1 73m2  > OR = 60   eGFR AFRICAN AMERICAN 73 mL/min/1 73m2  > OR = 60   BUN/CREATININE RATIO   6-51   NOT APPLICABLE (calc)   SODIUM 138 mmol/L  135-146   POTASSIUM 4 4 mmol/L  3 5-5 3   CHLORIDE 105 mmol/L     CARBON DIOXIDE 28 mmol/L  20-31   CALCIUM 9 8 mg/dL  8 6-10 3   PROTEIN, TOTAL 6 2 g/dL  6 1-8 1   ALBUMIN 3 9 g/dL  3 6-5 1   GLOBULIN 2 3 g/dL (calc)  1 9-3 7   ALBUMIN/GLOBULIN RATIO 1 7 (calc)  1 0-2 5   BILIRUBIN, TOTAL 0 5 mg/dL  0 2-1 2   ALKALINE PHOSPHATASE 38 U/L L    AST 11 U/L  10-35   ALT 11 U/L  9-46     (1) PSA (SCREEN) (Dx V76 44 Screen for Prostate Cancer) 58KPZ6616 11:38AM Nasra Schaefer     Test Name Result Flag Reference   PSA, TOTAL 0 7 ng/mL  < OR = 4 0   The total PSA value from this assay system is   standardized against the WHO standard  The test   result will be approximately 20% lower when compared   to the equimolar-standardized total PSA (Tommy   Mikki)  Comparison of serial PSA results should be   interpreted with this fact in mind  This test was performed using the Siemens   chemiluminescent method  Values obtained from   different assay methods cannot be used  interchangeably  PSA levels, regardless of  value, should not be interpreted as absolute  evidence of the presence or absence of disease  (Q) LIPID PANEL WITH REFLEX TO DIRECT LDL 10PPY6907 11:38AM Nasra Perezamna     Test Name Result Flag Reference   CHOLESTEROL, TOTAL 169 mg/dL  125-200   HDL CHOLESTEROL 49 mg/dL  > OR = 40   TRIGLICERIDES 81 mg/dL  <678   LDL-CHOLESTEROL 104 mg/dL (calc)  <130   Desirable range <100 mg/dL for patients with CHD or  diabetes and <70 mg/dL for diabetic patients with  known heart disease  CHOL/HDLC RATIO 3 4 (calc)  < OR = 5 0   NON HDL CHOLESTEROL 120 mg/dL (calc)     Target for non-HDL cholesterol is 30 mg/dL higher than   LDL cholesterol target  (Q) TSH, 3RD GENERATION W/REFLEX TO FT4 74MMO6509 11:38AM Nasra Perezamna     Test Name Result Flag Reference   TSH W/REFLEX TO FT4 0 96 mIU/L  0 40-4 50     (Q) HEMOGLOBIN A1c 57Lvt2006 11:38AM Nasra Schaefer   REPORT COMMENT:  FASTING:YES     Test Name Result Flag Reference   HEMOGLOBIN A1c 5 7 % of total Hgb H <5 7   For someone without known diabetes, a hemoglobin   A1c value between 5 7% and 6 4% is consistent with  prediabetes and should be confirmed with a   follow-up test      For someone with known diabetes, a value <7%  indicates that their diabetes is well controlled  A1c  targets should be individualized based on duration of  diabetes, age, comorbid conditions, and other  considerations       This assay result is consistent with an increased risk  of diabetes  Currently, no consensus exists regarding use of  hemoglobin A1c for diagnosis of diabetes for children

## 2018-01-22 VITALS
HEART RATE: 58 BPM | HEIGHT: 67 IN | WEIGHT: 198 LBS | SYSTOLIC BLOOD PRESSURE: 138 MMHG | BODY MASS INDEX: 31.08 KG/M2 | DIASTOLIC BLOOD PRESSURE: 80 MMHG

## 2018-01-23 VITALS
WEIGHT: 203 LBS | BODY MASS INDEX: 31.79 KG/M2 | SYSTOLIC BLOOD PRESSURE: 120 MMHG | DIASTOLIC BLOOD PRESSURE: 64 MMHG | HEART RATE: 75 BPM

## 2018-03-07 PROBLEM — F51.01 PRIMARY INSOMNIA: Status: ACTIVE | Noted: 2017-08-30

## 2018-03-08 ENCOUNTER — OFFICE VISIT (OUTPATIENT)
Dept: FAMILY MEDICINE CLINIC | Facility: CLINIC | Age: 78
End: 2018-03-08
Payer: COMMERCIAL

## 2018-03-08 VITALS
HEART RATE: 79 BPM | SYSTOLIC BLOOD PRESSURE: 132 MMHG | DIASTOLIC BLOOD PRESSURE: 82 MMHG | OXYGEN SATURATION: 96 % | TEMPERATURE: 97.8 F | RESPIRATION RATE: 16 BRPM | WEIGHT: 206.7 LBS | HEIGHT: 67 IN | BODY MASS INDEX: 32.44 KG/M2

## 2018-03-08 DIAGNOSIS — E78.2 MIXED HYPERLIPIDEMIA: ICD-10-CM

## 2018-03-08 DIAGNOSIS — J30.1 CHRONIC ALLERGIC RHINITIS DUE TO POLLEN, UNSPECIFIED SEASONALITY: ICD-10-CM

## 2018-03-08 DIAGNOSIS — E11.8 TYPE 2 DIABETES MELLITUS WITH COMPLICATION, UNSPECIFIED LONG TERM INSULIN USE STATUS: ICD-10-CM

## 2018-03-08 DIAGNOSIS — N40.0 BENIGN PROSTATIC HYPERPLASIA WITHOUT LOWER URINARY TRACT SYMPTOMS: ICD-10-CM

## 2018-03-08 DIAGNOSIS — I10 BENIGN ESSENTIAL HYPERTENSION: Primary | ICD-10-CM

## 2018-03-08 DIAGNOSIS — Z12.5 SCREENING FOR PROSTATE CANCER: ICD-10-CM

## 2018-03-08 PROBLEM — J30.9 ALLERGIC RHINITIS: Status: ACTIVE | Noted: 2018-03-08

## 2018-03-08 PROCEDURE — 99214 OFFICE O/P EST MOD 30 MIN: CPT | Performed by: FAMILY MEDICINE

## 2018-03-08 RX ORDER — ASPIRIN 325 MG
1 TABLET, DELAYED RELEASE (ENTERIC COATED) ORAL
COMMUNITY
Start: 2014-05-09

## 2018-03-08 RX ORDER — AMLODIPINE BESYLATE 2.5 MG/1
2.5 TABLET ORAL
Qty: 90 TABLET | Refills: 1 | Status: SHIPPED | OUTPATIENT
Start: 2018-03-08 | End: 2018-12-27 | Stop reason: SDUPTHER

## 2018-03-08 RX ORDER — FLUTICASONE PROPIONATE 50 MCG
2 SPRAY, SUSPENSION (ML) NASAL DAILY
Qty: 1 BOTTLE | Refills: 2 | Status: SHIPPED | OUTPATIENT
Start: 2018-03-08 | End: 2018-07-11 | Stop reason: SDUPTHER

## 2018-03-08 RX ORDER — FLUTICASONE PROPIONATE 50 MCG
2 SPRAY, SUSPENSION (ML) NASAL DAILY
COMMUNITY
Start: 2017-11-21 | End: 2018-03-08 | Stop reason: SDUPTHER

## 2018-03-08 RX ORDER — VALSARTAN AND HYDROCHLOROTHIAZIDE 320; 25 MG/1; MG/1
1 TABLET, FILM COATED ORAL
Qty: 90 TABLET | Refills: 1 | Status: SHIPPED | OUTPATIENT
Start: 2018-03-08 | End: 2018-10-08 | Stop reason: SDUPTHER

## 2018-03-08 NOTE — PROGRESS NOTES
Assessment/Plan:    Allergic rhinitis  Doing reasonably well on flonase, but still w/ ongoing chronic congestion  Will refer to ENT  Hyperlipidemia  Doing well on red yeast rice, 4 caps daily  Will check labs soon  DM2 (diabetes mellitus, type 2) ([facility])  Diet controlled  Cont diet and exercise  Will check labs in near future  Benign prostatic hyperplasia  Stable on tamsulosin   4 bid and finasteride 5  Cont reg f/u / urology  Will check psa    Benign essential hypertension  Controlled on valsartan 320/25, amlodipine 2 5  Diagnoses and all orders for this visit:    Benign essential hypertension  -     amLODIPine (NORVASC) 2 5 mg tablet; Take 1 tablet (2 5 mg total) by mouth daily at bedtime for 90 days  -     valsartan-hydrochlorothiazide (DIOVAN-HCT) 320-25 MG per tablet; Take 1 tablet by mouth daily at bedtime for 90 days  -     CBC and differential  -     TSH, 3rd generation with T4 reflex    Benign prostatic hyperplasia without lower urinary tract symptoms    Mixed hyperlipidemia  -     Lipid Panel with Direct LDL reflex    Chronic allergic rhinitis due to pollen, unspecified seasonality  -     fluticasone (FLONASE) 50 mcg/act nasal spray; 2 sprays into each nostril daily for 30 days  -     Ambulatory Referral to Otolaryngology; Future    Type 2 diabetes mellitus with complication, unspecified long term insulin use status (HCC)  -     Comprehensive metabolic panel  -     HEMOGLOBIN A1C W/ EAG ESTIMATION    Screening for prostate cancer  -     PSA    Other orders  -     aspirin (ECOTRIN) 325 mg EC tablet; Take 1 tablet by mouth  -     Discontinue: fluticasone (FLONASE) 50 mcg/act nasal spray; 2 sprays into each nostril daily  -     cholecalciferol (VITAMIN D3) 1,000 units tablet; Take by mouth       Rx given for fasting blood work at [facility]   follow-up 3-4 weeks to review labs, Medicare wellness visit, and physical    Subjective:      Patient ID: Veronica Mosley is a 78 y o  male      Patient presents for recheck of chronic medical problems today  His insurance company called him to remind him that he has not been seen by a doctor in a while  Overall he is feeling well  He is compliant with all prescribed medications  Doing well on blood pressure medications, valsartan and amlodipine  Doing well on prostate medications tamsulosin and finasteride  Patient still sees Urology  Doing well on OTC red yeast rice  Doing well on fluticasone nasal spray, but patient still has chronic congestion especially on 1 side of his face  Patient has not had labs drawn in a while        The following portions of the patient's history were reviewed and updated as appropriate: allergies, current medications, past family history, past medical history, past social history, past surgical history and problem list     Review of Systems   Respiratory: Negative  Cardiovascular: Negative  Gastrointestinal: Negative  Genitourinary: Negative  Objective:      /82   Pulse 79   Temp 97 8 °F (36 6 °C) (Tympanic)   Resp 16   Ht 5' 6 93" (1 7 m)   Wt 93 8 kg (206 lb 11 2 oz)   SpO2 96%   BMI 32 44 kg/m²          Physical Exam   Cardiovascular: Normal rate, regular rhythm, normal heart sounds and intact distal pulses  Carotids: no bruits  Ext: no edema   Pulmonary/Chest: Effort normal  No respiratory distress  He has no wheezes  He has no rales  Psychiatric: He has a normal mood and affect   His behavior is normal  Thought content normal

## 2018-03-13 LAB
ALBUMIN SERPL-MCNC: 4.2 G/DL (ref 3.6–5.1)
ALBUMIN/GLOB SERPL: 1.6 (CALC) (ref 1–2.5)
ALP SERPL-CCNC: 43 U/L (ref 40–115)
ALT SERPL-CCNC: 12 U/L (ref 9–46)
AST SERPL-CCNC: 13 U/L (ref 10–35)
BASOPHILS # BLD AUTO: 20 CELLS/UL (ref 0–200)
BASOPHILS NFR BLD AUTO: 0.4 %
BILIRUB SERPL-MCNC: 0.7 MG/DL (ref 0.2–1.2)
BUN SERPL-MCNC: 25 MG/DL (ref 7–25)
BUN/CREAT SERPL: 17 (CALC) (ref 6–22)
CALCIUM SERPL-MCNC: 10.3 MG/DL (ref 8.6–10.3)
CHLORIDE SERPL-SCNC: 102 MMOL/L (ref 98–110)
CHOLEST SERPL-MCNC: 193 MG/DL
CHOLEST/HDLC SERPL: 4.2 (CALC)
CO2 SERPL-SCNC: 29 MMOL/L (ref 20–31)
CREAT SERPL-MCNC: 1.49 MG/DL (ref 0.7–1.18)
EOSINOPHIL # BLD AUTO: 189 CELLS/UL (ref 15–500)
EOSINOPHIL NFR BLD AUTO: 3.7 %
ERYTHROCYTE [DISTWIDTH] IN BLOOD BY AUTOMATED COUNT: 12.5 % (ref 11–15)
EST. AVERAGE GLUCOSE BLD GHB EST-MCNC: 128 (CALC)
EST. AVERAGE GLUCOSE BLD GHB EST-SCNC: 7.1 (CALC)
GLOBULIN SER CALC-MCNC: 2.7 G/DL (CALC) (ref 1.9–3.7)
GLUCOSE SERPL-MCNC: 134 MG/DL (ref 65–99)
HBA1C MFR BLD: 6.1 % OF TOTAL HGB
HCT VFR BLD AUTO: 49.6 % (ref 38.5–50)
HDLC SERPL-MCNC: 46 MG/DL
HGB BLD-MCNC: 17.1 G/DL (ref 13.2–17.1)
LDLC SERPL CALC-MCNC: 122 MG/DL (CALC)
LYMPHOCYTES # BLD AUTO: 1168 CELLS/UL (ref 850–3900)
LYMPHOCYTES NFR BLD AUTO: 22.9 %
MCH RBC QN AUTO: 31.8 PG (ref 27–33)
MCHC RBC AUTO-ENTMCNC: 34.5 G/DL (ref 32–36)
MCV RBC AUTO: 92.4 FL (ref 80–100)
MONOCYTES # BLD AUTO: 638 CELLS/UL (ref 200–950)
MONOCYTES NFR BLD AUTO: 12.5 %
NEUTROPHILS # BLD AUTO: 3086 CELLS/UL (ref 1500–7800)
NEUTROPHILS NFR BLD AUTO: 60.5 %
NONHDLC SERPL-MCNC: 147 MG/DL (CALC)
PLATELET # BLD AUTO: 242 THOUSAND/UL (ref 140–400)
PMV BLD REES-ECKER: 10.3 FL (ref 7.5–12.5)
POTASSIUM SERPL-SCNC: 4.8 MMOL/L (ref 3.5–5.3)
PROT SERPL-MCNC: 6.9 G/DL (ref 6.1–8.1)
PSA SERPL-MCNC: 0.6 NG/ML
RBC # BLD AUTO: 5.37 MILLION/UL (ref 4.2–5.8)
SL AMB EGFR AFRICAN AMERICAN: 52 ML/MIN/1.73M2
SL AMB EGFR NON AFRICAN AMERICAN: 45 ML/MIN/1.73M2
SODIUM SERPL-SCNC: 138 MMOL/L (ref 135–146)
TRIGL SERPL-MCNC: 132 MG/DL
TSH SERPL-ACNC: 1.57 MIU/L (ref 0.4–4.5)
WBC # BLD AUTO: 5.1 THOUSAND/UL (ref 3.8–10.8)

## 2018-03-15 ENCOUNTER — TELEPHONE (OUTPATIENT)
Dept: FAMILY MEDICINE CLINIC | Facility: CLINIC | Age: 78
End: 2018-03-15

## 2018-03-16 NOTE — TELEPHONE ENCOUNTER
Call patient with lab results  His blood sugar was high at 134  A1c 6 1%  Remainder of labs look pretty good  Cholesterol 193, normal thyroid, normal prostate level    Recommend follow-up in near future for Medicare wellness exam

## 2018-04-02 DIAGNOSIS — N40.1 BENIGN PROSTATIC HYPERPLASIA WITH LOWER URINARY TRACT SYMPTOMS, SYMPTOM DETAILS UNSPECIFIED: Primary | ICD-10-CM

## 2018-04-02 RX ORDER — TAMSULOSIN HYDROCHLORIDE 0.4 MG/1
CAPSULE ORAL
Qty: 180 CAPSULE | Refills: 0 | Status: SHIPPED | OUTPATIENT
Start: 2018-04-02 | End: 2018-07-03 | Stop reason: SDUPTHER

## 2018-04-17 ENCOUNTER — OFFICE VISIT (OUTPATIENT)
Dept: UROLOGY | Facility: CLINIC | Age: 78
End: 2018-04-17
Payer: COMMERCIAL

## 2018-04-17 VITALS
HEIGHT: 67 IN | SYSTOLIC BLOOD PRESSURE: 160 MMHG | WEIGHT: 209 LBS | DIASTOLIC BLOOD PRESSURE: 80 MMHG | HEART RATE: 68 BPM | RESPIRATION RATE: 20 BRPM | BODY MASS INDEX: 32.8 KG/M2

## 2018-04-17 DIAGNOSIS — R32 URINARY INCONTINENCE, UNSPECIFIED TYPE: Primary | ICD-10-CM

## 2018-04-17 DIAGNOSIS — N40.0 BENIGN PROSTATIC HYPERPLASIA WITHOUT LOWER URINARY TRACT SYMPTOMS: ICD-10-CM

## 2018-04-17 LAB
SL AMB  POCT GLUCOSE, UA: NORMAL
SL AMB LEUKOCYTE ESTERASE,UA: NORMAL
SL AMB POCT BILIRUBIN,UA: NORMAL
SL AMB POCT BLOOD,UA: NORMAL
SL AMB POCT CLARITY,UA: CLEAR
SL AMB POCT COLOR,UA: YELLOW
SL AMB POCT KETONES,UA: NORMAL
SL AMB POCT NITRITE,UA: NORMAL
SL AMB POCT PH,UA: 5
SL AMB POCT SPECIFIC GRAVITY,UA: 1.01
SL AMB POCT URINE PROTEIN: NORMAL
SL AMB POCT UROBILINOGEN: NORMAL

## 2018-04-17 PROCEDURE — 99213 OFFICE O/P EST LOW 20 MIN: CPT | Performed by: UROLOGY

## 2018-04-17 PROCEDURE — 51798 US URINE CAPACITY MEASURE: CPT | Performed by: UROLOGY

## 2018-04-17 PROCEDURE — 81002 URINALYSIS NONAUTO W/O SCOPE: CPT | Performed by: UROLOGY

## 2018-04-26 ENCOUNTER — OFFICE VISIT (OUTPATIENT)
Dept: FAMILY MEDICINE CLINIC | Facility: CLINIC | Age: 78
End: 2018-04-26
Payer: COMMERCIAL

## 2018-04-26 VITALS
BODY MASS INDEX: 32.56 KG/M2 | HEIGHT: 67 IN | SYSTOLIC BLOOD PRESSURE: 132 MMHG | DIASTOLIC BLOOD PRESSURE: 80 MMHG | OXYGEN SATURATION: 99 % | WEIGHT: 207.44 LBS | HEART RATE: 59 BPM | RESPIRATION RATE: 16 BRPM | TEMPERATURE: 96.8 F

## 2018-04-26 DIAGNOSIS — E78.2 MIXED HYPERLIPIDEMIA: ICD-10-CM

## 2018-04-26 DIAGNOSIS — Z00.00 MEDICARE ANNUAL WELLNESS VISIT, INITIAL: Primary | ICD-10-CM

## 2018-04-26 DIAGNOSIS — N40.1 BENIGN PROSTATIC HYPERPLASIA WITH URINARY FREQUENCY: ICD-10-CM

## 2018-04-26 DIAGNOSIS — R41.3 MEMORY PROBLEM: ICD-10-CM

## 2018-04-26 DIAGNOSIS — E11.8 TYPE 2 DIABETES MELLITUS WITH COMPLICATION, UNSPECIFIED WHETHER LONG TERM INSULIN USE: ICD-10-CM

## 2018-04-26 DIAGNOSIS — I10 BENIGN ESSENTIAL HYPERTENSION: ICD-10-CM

## 2018-04-26 DIAGNOSIS — R35.0 BENIGN PROSTATIC HYPERPLASIA WITH URINARY FREQUENCY: ICD-10-CM

## 2018-04-26 PROCEDURE — 3075F SYST BP GE 130 - 139MM HG: CPT | Performed by: FAMILY MEDICINE

## 2018-04-26 PROCEDURE — 3725F SCREEN DEPRESSION PERFORMED: CPT | Performed by: FAMILY MEDICINE

## 2018-04-26 PROCEDURE — 3079F DIAST BP 80-89 MM HG: CPT | Performed by: FAMILY MEDICINE

## 2018-04-26 PROCEDURE — G0438 PPPS, INITIAL VISIT: HCPCS | Performed by: FAMILY MEDICINE

## 2018-04-26 PROCEDURE — 99213 OFFICE O/P EST LOW 20 MIN: CPT | Performed by: FAMILY MEDICINE

## 2018-04-26 PROCEDURE — 1101F PT FALLS ASSESS-DOCD LE1/YR: CPT | Performed by: FAMILY MEDICINE

## 2018-04-26 NOTE — ASSESSMENT & PLAN NOTE
Chol from 3/12 was 193/122  Pt has not been taking red yeast rice or omega 3 lately  I encouraged him to do so

## 2018-04-26 NOTE — ASSESSMENT & PLAN NOTE
Patient and wife have been noticing worsening memory recently  Pt forgot that he had a recent appointment w/ me   Will refer to neuro for eval

## 2018-04-26 NOTE — PROGRESS NOTES
HPI:  Oj Justice is a 78 y o  male here for his Initial Wellness Visit  Patient Active Problem List   Diagnosis    Arteriosclerosis of coronary artery    Benign essential hypertension    Benign prostatic hyperplasia    DM2 (diabetes mellitus, type 2) ([address] )    Hyperlipidemia    Osteoarthritis of right hip    Primary insomnia    Vitamin D deficiency    Allergic rhinitis     Past Medical History:   Diagnosis Date    Arthritis     Bladder tumor     BPH (benign prostatic hypertrophy)     Cataracts, bilateral     "start of"    Coronary artery disease     2 stents  MI x2    Diabetes mellitus ([facility] [address] )     Diet controlled pre-diabetic    Glaucoma     "Start of"    History of bladder stone     Hyperlipidemia     Hypertension     Knee pain, bilateral     If walking far  No assistive devices    Myocardial infarction [facility]) 2011, 2012    x2    Urinary incontinence     Wears glasses      Past Surgical History:   Procedure Laterality Date    BLADDER STONE REMOVAL      Laser procedure    COLONOSCOPY      CORONARY ANGIOPLASTY WITH STENT PLACEMENT      Has 2 stents    HERNIA REPAIR Bilateral     Inguinal    JOINT REPLACEMENT Bilateral     TKR    JOINT REPLACEMENT Right     Right THR    SD CYSTOURETHROSCOPY,FULGUR <0 5 CM LESN N/A 2/8/2017    Procedure: CYSTOSCOPY WITH BIOPSIES WITH FULGURATION, INSTILLATION OF MYTOMYCIN, DIALATION OF MEATUS ;  Surgeon: Mariola Cochran MD;  [facility];  Service: Urology    WISDOM TOOTH EXTRACTION       No family history on file  History   Smoking Status    Former Smoker    Types: Cigarettes, Pipe   Smokeless Tobacco    Never Used     Comment: Quit 50 years ago and only smoked for 1 year     History   Alcohol Use    Yes     Comment: 2 weekly      History   Drug Use No     There were no vitals taken for this visit        Current Outpatient Prescriptions   Medication Sig Dispense Refill    acetaminophen (TYLENOL) 325 mg tablet Take 650 mg by mouth every 6 (six) hours as needed for mild pain      amLODIPine (NORVASC) 2 5 mg tablet Take 1 tablet (2 5 mg total) by mouth daily at bedtime for 90 days 90 tablet 1    aspirin (ECOTRIN) 325 mg EC tablet Take 1 tablet by mouth      cholecalciferol (VITAMIN D3) 1,000 units tablet Take by mouth      finasteride (PROSCAR) 5 mg tablet Take 5 mg by mouth every morning      fluticasone (FLONASE) 50 mcg/act nasal spray 2 sprays into each nostril daily for 30 days 1 Bottle 2    Mirabegron ER 25 MG TB24 Take 25 mg by mouth daily for 30 doses 30 tablet 3    nitroglycerin (NITROSTAT) 0 4 mg SL tablet Place 0 4 mg under the tongue every 5 (five) minutes as needed for chest pain      Omega-3 Fatty Acids (OMEGA 3 PO) Take 2 capsules by mouth daily      Red Yeast Rice Extract (RED YEAST RICE PO) Take 2 capsules by mouth daily      tamsulosin (FLOMAX) 0 4 mg take 1 capsule by mouth twice a day 180 capsule 0    valsartan-hydrochlorothiazide (DIOVAN-HCT) 320-25 MG per tablet Take 1 tablet by mouth daily at bedtime for 90 days 90 tablet 1     No current facility-administered medications for this visit        No Known Allergies  Immunization History   Administered Date(s) Administered    Influenza Split High Dose Preservative Free IM 08/30/2017    Pneumococcal Conjugate 13-Valent 03/20/2015    Pneumococcal Polysaccharide PPV23 07/19/2013    Zoster 01/01/2013       Patient Care Team:  Robin Cruz DO as PCP - MD Enzo Houston PA-C Chari Lower, MD Galvin Bison, MD    Medicare Screening Tests and Risk Assessments:  AWV Clinical     ISAR:   Previous hospitalizations?:  No       Once in a Lifetime Medicare Screening:   EKG performed?:  No    AAA screening performed? (if performed, please add date to Health Maintenance):  No       Medicare Screening Tests and Risk Assessment:   AAA Risk Assessment    None Indicated:  Yes    Osteoporosis Risk Assessment     Female:  No   :  Yes :   No   Age over 48:  Yes Low body weight (<127lbs):  No   Tobacco use:  No Alcohol use:  No   Low calcium diet:  No PMHX of fractures:  No   FHX of fractures:  No    HIV Risk Assessment    None indicated:  Yes        Drug and Alcohol Use:   Tobacco use    Cigarettes:  never smoker    Tobacco use duration    Tobacco Cessation Readiness    Alcohol use    Alcohol use:  occasional use    Amount of alcohol consumed:  2-3   Concern about alcohol use:  No Tolerance to alcohol:  No   Attempts to cut down:  No Guilt about use:  No   Patient concern:  No Annoyed by criticism:  No   Morning drinking:  No Family concern:  No   Alcohol Treatment Readiness   Readiness to quit:  declined    Illicit Drug Use    Drug use:  never    Drug type:  no sedative use       Diet & Exercise:   Diet   What is your diet?:  Regular, Limited junk food   How many servings a day of the following:   Fruits and Vegetables:  1-2 Meat:  3-4   Whole Grains:  4 Simple Carbs:  1   Dairy:  1 Soda:  0   Coffee:  3 Tea:  3   Exercise    Do you currently exercise?:  yes    Frequency:  occasional   Times per week:  3     Type of exercise:  walking       Cognitive Impairment Screening:   Depression screening preformed:  Yes Depression screen score:  1   Geriatric Depression scale score:  0 PHQ-9 Depression scale score:  0   Depression screening results:  no significant symptoms, negative for symptoms   Cognitive Impairment Screening    Do you have difficulty learning or retaining new information?:  Yes Do you have difficulty handling new tasks?:  Yes   Do you have difficulty with reasoning?:  No Do you have difficulty with spatial ability and orientation?:  No   Do you have difficulty with language?:  No Do you have difficulty with behavior?:  No       Functional Ability/Level of Safety:   Hearing    Hearing difficulties:  No Bilateral:  normal   Left:  normal Right:  normal   Hearing aid:  No    Hearing Impairment Assessment    Hearing status:  No impairment   Do your family members ever complain that you turn on the radio or T V  too loudly?:  No Do you find that other people have to repeat themselves when talking to you?:  No   Do you have difficulty hearing while talking on the phone?:  No Has anyone ever told you that you are speaking too loudly when talking with them?:  No   Do you have trouble hearing the doorbell or phone ringing?:  No Do you have difficulty hearing such that you feel frustrated talking to people?:  No   Do you feel sad because you cannot hear well?:  No Do you feel inconvienced due to your hearing problem?:  No   Do you think you would be a happier person if you could hear better?:  No Would you be willing to go for a hearing aid fitting if suggested?:  No   Current Activities    Status:  unlimited ADL's, unlimited driving, unlimited IADL's, unlimited social activities   Help needed with the folllowing:    Using the phone:  No Transportation:  No   Shopping:  No Preparing Meals:  No   Doing Housework:  No Doing Laundry:  No   Managing Medications:  No Managing Money:  No   ADL    Feeding:  Independant   Oral hygiene and Facial grooming:  Independant   Bathing:  Independant   Upper Body Dressing:  Independant   Lower Body Dressing:  Independant   Toileting:  Independant   Bed Mobility:  Independant   Fall Risk   Have you fallen in the last 12 months?:  Yes Are you unsteady on your feet?:  No   How many times?:  2 Are you taking any medications that may cause fatigue or dizziness?:  No    Do you rush to the bathroom potentially risking a fall?:  No   Injury History   Polypharmacy:  No Antidepressant Use:  No   Sedative Use:  No Antihypertensive Use:  No   Previous Fall:  No Alcohol Use:  No   Deconditioning:  No Visual Impairment:  No   Cogitive Impairment:  No Mmobility Impairment:  No   Postural Hypotension:  No Urinary Incontinence:  Yes       Home Safety:   Are there hazards in your environment?:  No   If you fell, would you need help to get back up from the ground?:  No Do you have problems or concerns getting in/out of a bed, chair, tub, or toilet?:  No   Do you feel unsteady when walking?:  No Is your activity limited by pain?:  No   Do you have handrails and grab-bars in the home?:  Yes Are emergency numbers kept by the phone and regularly updated?:  Yes   Are you and/or family members aware of the dangers of smoking in bed?:  Yes Are firearms stored securely?:  Yes   Do you have working smoke alarms and fire extinguisher?:  Yes Do all household members know how to use them?:  Yes   Have you left the stove on unsupervised?:  No    Home Safety Risk Factors   Unfamilar with surroundings:  No Uneven floors:  No   Stairs or handrail saftey risk:  No Loose rugs:  No   Household clutter:  No Poor household lighting:  No   No grab bars in bathroom:  No Further evaluation needed:  No       Advanced Directives:   Advanced Directives    Living Will:  Yes Durable POA for healthcare:  Yes   Patient's End of Life Decisions        Urinary Incontinence:       Glaucoma:            Provider Screening     Preventative Screening/Counseling:   Cardiovascular Screening/Counseling:   (Labs Q5 years, EKG optional one-time)   General:  Risks and Benefits Discussed           Diabetes Screening/Counseling:   (2 tests/year if Pre-Diabetes or 1 test/year if no Diabetes)   General:  Risks and Benefits Discussed           Colorectal Cancer Screening/Counseling:   (FOBT Q1 yr; Flex Sig Q4 yrs or Q10 yrs after Screening Colonoscopy; Screening Colonoscpy Q2 yrs High Risk or Q10 yrs Low Risk; Barium Enema Q2 yrs High Risk or Q4 yrs Low Risk)   General:  Risks and Benefits Discussed           Prostate Cancer Screening/Counseling:   (Annual)    General:  Risks and Benefits Discussed          Breast Cancer Screening/Counseling:   (Baseline Age 28 - 43;  Annual Age 36+)         Cervical Cancer Screening/Counseling:   (Annual for High Risk or Childbearing Age with Abnormal Pap in Last 3 yrs; Every 2 all others)         Osteoporosis Screening/Counseling:   (Every 2 Yrs if at risk or more if medically necessary)   General:  Risks and Benefits Discussed           AAA Screening/Counseling:   (Once per Lifetime with risk factors)    General:  Risks and Benefits Discussed           Glaucoma Screening/Counseling:   (Annual)   General:  Risks and Benefits Discussed          HIV Screening/Counseling:   (Voluntary; Once annually for high risk OR 3 times for Pregnancy at diagnosis of IUP; 3rd trimester; and at Labor   General:  Risks and Benefits Discussed           Hepatitis C Screening:             Immunizations:   Influenza (annual):  Risks & Benefits Discussed, Influenza UTD This Year   Pneumococcal (Once in a Lifetime):  Risks & Benefits Discussed, Lifetime Vaccine Completed   Zostavax (Medicare D Coverage, Pt >70 yo):  Risks & Benefits Discussed, Zostavax Vaccine UTD       Other Preventative Couseling (Non-Medicare Wellness Visit Required):   nutrition counseling performed       Referrals (Non-Medicare Wellness Visit Required):       Medical Equipment/Suppliers:           No exam data present    Physical Exam :  Physical Exam   Constitutional: He is oriented to person, place, and time  He appears well-developed and well-nourished  HENT:   Head: Normocephalic and atraumatic  Eyes: Pupils are equal, round, and reactive to light  Neck: Neck supple  Cardiovascular: Normal rate, regular rhythm, normal heart sounds and intact distal pulses  Pulmonary/Chest: Effort normal and breath sounds normal    Abdominal: Soft  Bowel sounds are normal    Neurological: He is alert and oriented to person, place, and time  Psychiatric: He has a normal mood and affect   His behavior is normal  Judgment and thought content normal        Reviewed Updated [facility] Prior Wellness Visits:   Last Medicare wellness visit information was reviewed, patient interviewed , no change since last AWVyes  Last Medicare wellness visit information was reviewed, patient interviewed and updates made to the record today yes    Assessment and Plan:  1  Medicare annual wellness visit, initial         Health Maintenance Due   Topic Date Due    Annual Wellnes Visit (AWV)  1940    SLP PLAN OF CARE  1940    Diabetic Foot Exam  05/25/1952    OPHTHALMOLOGY EXAM  05/25/1952    URINE MICROALBUMIN  05/25/1952    Depression Screening PHQ-9  05/25/1954    DTaP,Tdap,and Td Vaccines (1 - Tdap) 05/25/1963    GLAUCOMA SCREENING 67+ YR  05/25/2009    INFLUENZA VACCINE  09/01/2017    Medicare wellness visit  depression screen and fall risk were performed  Patient is current with age appropriate vaccinations  Information regarding living will and healthcare power of  was given today including a freezer packet

## 2018-04-26 NOTE — PROGRESS NOTES
Assessment/Plan:    Benign essential hypertension  Reasonably controlled on valsartan/hct 320/25 and amlodipine 2 5 mg qd  Will cont to monitor  DM2 (diabetes mellitus, type 2) (HCC)  a1c 6 1%  Cont reduced carb diet  Will cont to monitor  Memory problem  Patient and wife have been noticing worsening memory recently  Pt forgot that he had a recent appointment w/ me  Will refer to neuro for eval      Hyperlipidemia  Chol from 3/12 was 193/122  Pt has not been taking red yeast rice or omega 3 lately  I encouraged him to do so  Benign prostatic hyperplasia  Being followed by Urology  Patient was recently started on myrbetriq  Recheck 6 months, appointment and fasting blood work   Diagnoses and all orders for this visit:    Medicare annual wellness visit, initial    Memory problem  -     Ambulatory referral to Neurology; Future    Type 2 diabetes mellitus with complication, unspecified whether long term insulin use (HCC)    Benign essential hypertension    Mixed hyperlipidemia    Benign prostatic hyperplasia with urinary frequency          Subjective:      Patient ID: Omar Degroot is a 78 y o  male  Patient presents for recheck of chronic medical problems today  He had labs done on March 12th and would like to review them today  He also has been complaining of having memory problems recently  In fact, he forgot about his recent appointment with me  Otherwise, doing well on all prescribed medications  The following portions of the patient's history were reviewed and updated as appropriate: allergies, current medications, past family history, past medical history, past social history, past surgical history and problem list     Review of Systems   Respiratory: Negative  Cardiovascular: Negative  Gastrointestinal: Negative  Genitourinary: Negative            Objective:      /80 (BP Location: Left arm, Patient Position: Sitting, Cuff Size: Adult)   Pulse 59   Temp (!) 96 8 °F (36 °C) (Tympanic)   Resp 16   Ht 5' 7" (1 702 m)   Wt 94 1 kg (207 lb 7 oz)   SpO2 99%   BMI 32 49 kg/m²          Physical Exam   Cardiovascular: Normal rate, regular rhythm, normal heart sounds and intact distal pulses  Carotids: no bruits  Ext: no edema   Pulmonary/Chest: Effort normal  No respiratory distress  He has no wheezes  He has no rales  Psychiatric: He has a normal mood and affect   His behavior is normal  Thought content normal

## 2018-05-02 ENCOUNTER — OFFICE VISIT (OUTPATIENT)
Dept: FAMILY MEDICINE CLINIC | Facility: CLINIC | Age: 78
End: 2018-05-02
Payer: COMMERCIAL

## 2018-05-02 VITALS
OXYGEN SATURATION: 96 % | DIASTOLIC BLOOD PRESSURE: 70 MMHG | WEIGHT: 205.6 LBS | TEMPERATURE: 96.5 F | HEIGHT: 66 IN | BODY MASS INDEX: 33.04 KG/M2 | RESPIRATION RATE: 15 BRPM | HEART RATE: 61 BPM | SYSTOLIC BLOOD PRESSURE: 140 MMHG

## 2018-05-02 DIAGNOSIS — M70.22 OLECRANON BURSITIS OF LEFT ELBOW: ICD-10-CM

## 2018-05-02 DIAGNOSIS — I25.10 ARTERIOSCLEROSIS OF CORONARY ARTERY: Primary | ICD-10-CM

## 2018-05-02 DIAGNOSIS — R07.9 CHEST PAIN, UNSPECIFIED TYPE: ICD-10-CM

## 2018-05-02 PROCEDURE — 99213 OFFICE O/P EST LOW 20 MIN: CPT | Performed by: PHYSICIAN ASSISTANT

## 2018-05-02 RX ORDER — NITROGLYCERIN 0.4 MG/1
0.4 TABLET SUBLINGUAL
Qty: 5 TABLET | Refills: 2 | Status: SHIPPED | OUTPATIENT
Start: 2018-05-02 | End: 2021-07-26 | Stop reason: SDUPTHER

## 2018-05-02 RX ORDER — MELOXICAM 15 MG/1
15 TABLET ORAL DAILY
Qty: 14 TABLET | Refills: 0 | Status: SHIPPED | OUTPATIENT
Start: 2018-05-02 | End: 2020-02-19

## 2018-05-02 RX ORDER — CEPHALEXIN 500 MG/1
500 CAPSULE ORAL EVERY 12 HOURS SCHEDULED
Qty: 14 CAPSULE | Refills: 0 | Status: SHIPPED | OUTPATIENT
Start: 2018-05-02 | End: 2018-05-09

## 2018-05-02 NOTE — PROGRESS NOTES
Assessment/Plan:      Diagnoses and all orders for this visit:    Arteriosclerosis of coronary artery    Chest pain, unspecified type  -     nitroglycerin (NITROSTAT) 0 4 mg SL tablet; Place 1 tablet (0 4 mg total) under the tongue every 5 (five) minutes as needed for chest pain    Olecranon bursitis of left elbow  -     cephalexin (KEFLEX) 500 mg capsule; Take 1 capsule (500 mg total) by mouth every 12 (twelve) hours for 7 days  -     meloxicam (MOBIC) 15 mg tablet; Take 1 tablet (15 mg total) by mouth daily        81-year-old male here today for 1 day of noticeable left elbow redness, swelling and warmth to touch  No pain or restricted range of motion  No history of bursitis or trauma  Symptoms consistent with bursitis  I will try at least invasive treatment 1st with meloxicam once day with food in addition to Keflex b I d  For 7 days  He was advised to avoid pressure over the area and to ice the area as much as possible  He is to monitor the swelling and if it does not improve he was encouraged to consider making a follow-up appointment with Dr Uriel Cristina  To consider possible aspiration if necessary  Patient has a history of heart attack approximately   He has not needed his nitroglycerin but states that a while back he was given 90 day supply and has not used any of it and is now   He requested a very small amount as it is also costly so I gave him 5 pill sent into the local pharmacy with 2 refills  He has not been having any concerning symptoms and has not needed to use the nitro  Chief Complaint   Patient presents with    left elbow swollen     x2 days  no pain        Subjective:     Patient ID: Mark Pat is a 78 y o  male     76y/o male here today for left elbow swelling and warmth to touch past day  No traum  No elbow pain or limitation to ROM  No known bite  No chills or fever  No tx at home  Review of Systems   Constitutional: Negative      Musculoskeletal:        As in HPI   Skin:        As in HPI   Psychiatric/Behavioral: Negative  The following portions of the patient's history were reviewed and updated as appropriate: allergies, current medications, past family history, past medical history, past social history, past surgical history and problem list       Objective:     Physical Exam   Constitutional: He appears well-developed  Musculoskeletal:     Left elbow with moderate swelling with minimal redness and warmth to touch  It is fluctuant consistent with bursitis  There is no tenderness to palpation  He has full range of motion of the left elbow without pain  Psychiatric: He has a normal mood and affect  Vitals reviewed        Vitals:    05/02/18 1205   BP: 140/70   BP Location: Left arm   Patient Position: Sitting   Cuff Size: Adult   Pulse: 61   Resp: 15   Temp: (!) 96 5 °F (35 8 °C)   TempSrc: Tympanic   SpO2: 96%   Weight: 93 3 kg (205 lb 9 6 oz)   Height: 5' 6" (1 676 m)

## 2018-05-18 ENCOUNTER — OFFICE VISIT (OUTPATIENT)
Dept: FAMILY MEDICINE CLINIC | Facility: CLINIC | Age: 78
End: 2018-05-18
Payer: COMMERCIAL

## 2018-05-18 VITALS
SYSTOLIC BLOOD PRESSURE: 140 MMHG | TEMPERATURE: 96.3 F | HEIGHT: 67 IN | HEART RATE: 69 BPM | BODY MASS INDEX: 32.43 KG/M2 | WEIGHT: 206.6 LBS | DIASTOLIC BLOOD PRESSURE: 90 MMHG | OXYGEN SATURATION: 97 %

## 2018-05-18 DIAGNOSIS — M70.22 OLECRANON BURSITIS OF LEFT ELBOW: Primary | ICD-10-CM

## 2018-05-18 PROCEDURE — 20605 DRAIN/INJ JOINT/BURSA W/O US: CPT | Performed by: FAMILY MEDICINE

## 2018-05-18 NOTE — PROGRESS NOTES
Medium joint arthrocentesis  Date/Time: 5/18/2018 9:11 AM  Consent given by: patient  Supporting Documentation  Indications: joint swelling   Procedure Details  Location: elbow - L olecranon bursa  Needle size: 18 G  Ultrasound guidance: no  Approach: dorsal    Aspirate: yellow  Patient tolerance: patient tolerated the procedure well with no immediate complications  Dressing:  Sterile dressing applied       Sterile prep  I aspirated 18  ML of straw-colored clear   Fluid from elbow  Sterile dressing applied  Patient tolerated procedure well  Ace bandage applied  I advised patient to avoid resting his elbow on hard surfaces for the for see above future    Call if further problems

## 2018-07-03 DIAGNOSIS — N40.1 BENIGN PROSTATIC HYPERPLASIA WITH LOWER URINARY TRACT SYMPTOMS, SYMPTOM DETAILS UNSPECIFIED: ICD-10-CM

## 2018-07-03 RX ORDER — TAMSULOSIN HYDROCHLORIDE 0.4 MG/1
CAPSULE ORAL
Qty: 180 CAPSULE | Refills: 0 | Status: SHIPPED | OUTPATIENT
Start: 2018-07-03 | End: 2019-01-31 | Stop reason: SDUPTHER

## 2018-07-11 ENCOUNTER — OFFICE VISIT (OUTPATIENT)
Dept: FAMILY MEDICINE CLINIC | Facility: CLINIC | Age: 78
End: 2018-07-11
Payer: COMMERCIAL

## 2018-07-11 VITALS
WEIGHT: 204.3 LBS | RESPIRATION RATE: 16 BRPM | SYSTOLIC BLOOD PRESSURE: 138 MMHG | OXYGEN SATURATION: 95 % | HEIGHT: 67 IN | HEART RATE: 55 BPM | BODY MASS INDEX: 32.07 KG/M2 | TEMPERATURE: 97.5 F | DIASTOLIC BLOOD PRESSURE: 80 MMHG

## 2018-07-11 DIAGNOSIS — L72.3 SEBACEOUS CYST: ICD-10-CM

## 2018-07-11 DIAGNOSIS — N40.1 BENIGN PROSTATIC HYPERPLASIA WITH LOWER URINARY TRACT SYMPTOMS, SYMPTOM DETAILS UNSPECIFIED: Primary | ICD-10-CM

## 2018-07-11 DIAGNOSIS — J30.1 ALLERGIC RHINITIS DUE TO POLLEN, UNSPECIFIED SEASONALITY: Primary | ICD-10-CM

## 2018-07-11 PROBLEM — I10 HYPERTENSION: Status: ACTIVE | Noted: 2018-07-11

## 2018-07-11 PROBLEM — L82.0 SEBORRHEIC KERATOSES, INFLAMED: Status: ACTIVE | Noted: 2017-07-19

## 2018-07-11 PROCEDURE — 99213 OFFICE O/P EST LOW 20 MIN: CPT | Performed by: PHYSICIAN ASSISTANT

## 2018-07-11 RX ORDER — FINASTERIDE 5 MG/1
TABLET, FILM COATED ORAL
Qty: 90 TABLET | Refills: 3 | Status: SHIPPED | OUTPATIENT
Start: 2018-07-11 | End: 2019-03-29 | Stop reason: SDUPTHER

## 2018-07-11 RX ORDER — METHYLPREDNISOLONE 4 MG/1
TABLET ORAL
Qty: 21 TABLET | Refills: 0 | Status: SHIPPED | OUTPATIENT
Start: 2018-07-11 | End: 2019-02-28

## 2018-07-11 RX ORDER — FLUTICASONE PROPIONATE 50 MCG
2 SPRAY, SUSPENSION (ML) NASAL DAILY
Qty: 1 BOTTLE | Refills: 5 | Status: SHIPPED | OUTPATIENT
Start: 2018-07-11 | End: 2018-09-25 | Stop reason: SDUPTHER

## 2018-07-11 NOTE — PROGRESS NOTES
Assessment/Plan:         Diagnoses and all orders for this visit:    Allergic rhinitis due to pollen, unspecified seasonality  -     fluticasone (FLONASE) 50 mcg/act nasal spray; 2 sprays into each nostril daily for 30 days  -     Methylprednisolone 4 MG TBPK; Use as directed on package    Sebaceous cyst      Discussed condition with pt  He likely has left sided nasal congestion due to uncontrolled allergic rhinitis  I refilled his Fluticasone for him to take daily and also prescribed him a MDP to reduce the inflammation in his nose and sinuses and rec an OTC oral antihistamine once daily  He is to F/U in 1-2 wks if symptoms not significantly improved  He also has a sebaceous cyst of the right upper back  I rec observation for now as he is not symptomatic  If becomes inflamed or grows significantly, then can discuss further intervention at that time  Subjective:      Patient ID: Patrick Dickinson is a 70 y o  male  Pt presents with what he reports is left sided nasal congestion for the past few days  Not sure if it could be due to his allergies  Denies sinus pain/pressure/HA, PND, ST, cough, or any other related symptoms  He has been using Flonase daily but needs a refill  He also reports a lump under his skin of the right upper back and wanted it examined to make sure there is nothing to be significantly concerned about  Denies significant pain, itching, drainage, or other symptoms  Has not rapidly increased in size  The following portions of the patient's history were reviewed and updated as appropriate:   He  has a past medical history of Abnormal blood chemistry; Abnormal glucose; Arthritis; Bladder tumor; BPH (benign prostatic hypertrophy); Cataracts, bilateral; Coronary artery disease; Diabetes mellitus ([address] ); Erectile dysfunction of non-organic origin; Fatigue; Glaucoma; Hematuria, microscopic; History of bladder stone; Hyperlipidemia;  Hypertension; Knee pain, bilateral; Myocardial infarction [facility]) (2011, 2012); Primary insomnia; Recurrent right inguinal hernia; Urinary incontinence; Urinary urgency; and Wears glasses  He   Patient Active Problem List    Diagnosis Date Noted    Hypertension 07/11/2018    Olecranon bursitis of left elbow 05/18/2018    Memory problem 04/26/2018    Allergic rhinitis 03/08/2018    Primary insomnia 08/30/2017    Seborrheic keratoses, inflamed 07/19/2017    Symptomatic bradycardia 07/20/2016    Osteoarthritis of right hip 05/02/2016    Bladder calculus 10/30/2015    Benign essential hypertension 03/20/2015    Arteriosclerosis of coronary artery 04/29/2014    DM2 (diabetes mellitus, type 2) ([facility] [address] ) 04/29/2014    Vitamin D deficiency 04/17/2014    Benign prostatic hyperplasia 06/04/2012    Hyperlipidemia 06/04/2012     He  has a past surgical history that includes Coronary angioplasty with stent; Joint replacement (Bilateral); Joint replacement (Right); Hernia repair (Bilateral, 03/05/2015); Colonoscopy; Bladder stone removal; [place] tooth extraction; pr cystourethroscopy,fulgur <0 5 cm tang (N/A, 2/8/2017); Cardiac catheterization (2003); and Cystoscopy (11/11/2015)  His family history includes Bipolar disorder in his other; Diabetes in his brother and family; Hypertension in his family; No Known Problems in his father and mother; Skin cancer in his brother  He  reports that he has quit smoking  His smoking use included Cigarettes and Pipe  He has never used smokeless tobacco  He reports that he drinks alcohol  He reports that he does not use drugs    Current Outpatient Prescriptions   Medication Sig Dispense Refill    acetaminophen (TYLENOL) 325 mg tablet Take 650 mg by mouth every 6 (six) hours as needed for mild pain      aspirin (ECOTRIN) 325 mg EC tablet Take 1 tablet by mouth      cholecalciferol (VITAMIN D3) 1,000 units tablet Take by mouth      meloxicam (MOBIC) 15 mg tablet Take 1 tablet (15 mg total) by mouth daily 14 tablet 0    nitroglycerin (NITROSTAT) 0 4 mg SL tablet Place 1 tablet (0 4 mg total) under the tongue every 5 (five) minutes as needed for chest pain 5 tablet 2    Omega-3 Fatty Acids (OMEGA 3 PO) Take 2 capsules by mouth daily      Red Yeast Rice Extract (RED YEAST RICE PO) Take 2 capsules by mouth daily      tamsulosin (FLOMAX) 0 4 mg take 1 capsule by mouth twice a day 180 capsule 0    amLODIPine (NORVASC) 2 5 mg tablet Take 1 tablet (2 5 mg total) by mouth daily at bedtime for 90 days 90 tablet 1    finasteride (PROSCAR) 5 mg tablet TAKE 1 TABLET DAILY  90 tablet 3    fluticasone (FLONASE) 50 mcg/act nasal spray 2 sprays into each nostril daily for 30 days 1 Bottle 5    Methylprednisolone 4 MG TBPK Use as directed on package 21 tablet 0    Mirabegron ER 25 MG TB24 Take 25 mg by mouth daily for 30 doses 30 tablet 3    valsartan-hydrochlorothiazide (DIOVAN-HCT) 320-25 MG per tablet Take 1 tablet by mouth daily at bedtime for 90 days 90 tablet 1     No current facility-administered medications for this visit        Current Outpatient Prescriptions on File Prior to Visit   Medication Sig    acetaminophen (TYLENOL) 325 mg tablet Take 650 mg by mouth every 6 (six) hours as needed for mild pain    aspirin (ECOTRIN) 325 mg EC tablet Take 1 tablet by mouth    cholecalciferol (VITAMIN D3) 1,000 units tablet Take by mouth    meloxicam (MOBIC) 15 mg tablet Take 1 tablet (15 mg total) by mouth daily    nitroglycerin (NITROSTAT) 0 4 mg SL tablet Place 1 tablet (0 4 mg total) under the tongue every 5 (five) minutes as needed for chest pain    Omega-3 Fatty Acids (OMEGA 3 PO) Take 2 capsules by mouth daily    Red Yeast Rice Extract (RED YEAST RICE PO) Take 2 capsules by mouth daily    tamsulosin (FLOMAX) 0 4 mg take 1 capsule by mouth twice a day    amLODIPine (NORVASC) 2 5 mg tablet Take 1 tablet (2 5 mg total) by mouth daily at bedtime for 90 days    Mirabegron ER 25 MG TB24 Take 25 mg by mouth daily for 30 doses    valsartan-hydrochlorothiazide (DIOVAN-HCT) 320-25 MG per tablet Take 1 tablet by mouth daily at bedtime for 90 days     No current facility-administered medications on file prior to visit  He has No Known Allergies       Review of Systems   Constitutional: Negative  HENT: Positive for congestion  Negative for postnasal drip, sinus pain, sinus pressure and sore throat  Respiratory: Negative  Cardiovascular: Negative  Gastrointestinal: Negative  Genitourinary: Negative  Skin:        Lump right upper back          Objective:      /80 (BP Location: Right arm, Patient Position: Sitting, Cuff Size: Standard)   Pulse 55   Temp 97 5 °F (36 4 °C) (Tympanic)   Resp 16   Ht 5' 7 13" (1 705 m)   Wt 92 7 kg (204 lb 4 8 oz)   SpO2 95%   BMI 31 88 kg/m²          Physical Exam   Constitutional: He is oriented to person, place, and time  He appears well-developed and well-nourished  No distress  HENT:   Right Ear: Hearing, tympanic membrane, external ear and ear canal normal    Left Ear: Hearing, tympanic membrane, external ear and ear canal normal    Nose: Mucosal edema (Boggy erythematous turbinates on left) present  Mouth/Throat: Oropharynx is clear and moist and mucous membranes are normal    Neck: Neck supple  Cardiovascular: Normal rate, regular rhythm and normal heart sounds  Pulmonary/Chest: Effort normal and breath sounds normal    Lymphadenopathy:     He has no cervical adenopathy  Neurological: He is alert and oriented to person, place, and time  Skin:   Right upper back with approx 1 cm sebaceous cyst with blackhead visualized on surface of skin at center of cyst with no active drainage, erythema, or TTP  Does not appear to be a suspicious mass/lesion  Psychiatric: He has a normal mood and affect  Vitals reviewed

## 2018-09-18 ENCOUNTER — OFFICE VISIT (OUTPATIENT)
Dept: UROLOGY | Facility: CLINIC | Age: 78
End: 2018-09-18
Payer: COMMERCIAL

## 2018-09-18 VITALS
SYSTOLIC BLOOD PRESSURE: 130 MMHG | BODY MASS INDEX: 32.14 KG/M2 | DIASTOLIC BLOOD PRESSURE: 88 MMHG | WEIGHT: 206 LBS | HEART RATE: 64 BPM

## 2018-09-18 DIAGNOSIS — N21.0 BLADDER CALCULUS: ICD-10-CM

## 2018-09-18 DIAGNOSIS — N40.1 BENIGN PROSTATIC HYPERPLASIA WITH LOWER URINARY TRACT SYMPTOMS, SYMPTOM DETAILS UNSPECIFIED: ICD-10-CM

## 2018-09-18 DIAGNOSIS — N40.1 BENIGN PROSTATIC HYPERPLASIA WITH URINARY FREQUENCY: Primary | ICD-10-CM

## 2018-09-18 DIAGNOSIS — R35.0 BENIGN PROSTATIC HYPERPLASIA WITH URINARY FREQUENCY: Primary | ICD-10-CM

## 2018-09-18 PROCEDURE — 99213 OFFICE O/P EST LOW 20 MIN: CPT | Performed by: UROLOGY

## 2018-09-18 NOTE — PROGRESS NOTES
UROLOGY ROUTINE FOLLOW UP NOTE     History of Present Illness:   Erica Mills is a 70 y o  male who underwent a prior cystolitholapaxy  Patient has been doing reasonably well at home since that time  He does have persistent lower urinary tract issues including nighttime frequency, dysuria, urgency, decreased stream   He is currently on twice daily Flomax  Proscar was added last year and patient does note some improvement in his urinary stream     He underwent a bladder biopsy with instillation of mitomycin-C on February 8, 2017 due to microscopic hematuria and small lesion in his bladder  This was negative pathology  At last visit, we added myrbetriq 25mg and the patient requested return followup if he were to become more interested in surgical therapy  AUA 19 QoL 3    Patient is doing very well and continues to desire avoidance of surgical therapy  He recently refilled his medications  Past Medical History:     Past Medical History:   Diagnosis Date    Abnormal blood chemistry     Last Assessed: 7/19/2013    Abnormal glucose     Last Assessed: 11/6/2015    Arthritis     Bladder tumor     BPH (benign prostatic hypertrophy)     Cataracts, bilateral     "start of"    Coronary artery disease     2 stents  MI x2    Diabetes mellitus ([address] )     Diet controlled pre-diabetic    Erectile dysfunction of non-organic origin     Last Assessed: 5/9/2014    Fatigue     Resolved: 2/23/2015    Glaucoma     "Start of"    Hematuria, microscopic     Last Assessed: 11/7/2016    History of bladder stone     Hyperlipidemia     Hypertension     Knee pain, bilateral     If walking far   No assistive devices    Myocardial infarction [facility]) 2011, 2012    x2    Primary insomnia     Last Assessed; 7/20/2016    Recurrent right inguinal hernia     Last Assessed: 2/23/2015    Urinary incontinence     Urinary urgency     Last Assessed: 10/30/2015    Wears glasses        PAST SURGICAL HISTORY:     Past Surgical History:   Procedure Laterality Date    BLADDER STONE REMOVAL      Laser procedure    CARDIAC CATHETERIZATION  2003    RCA stenting and 2004 stenting to circuflex    COLONOSCOPY      CORONARY ANGIOPLASTY WITH STENT PLACEMENT      Has 2 stents    CYSTOSCOPY  11/11/2015    w/ Cystolitholapaxy    HERNIA REPAIR Bilateral 03/05/2015    Inguinal    JOINT REPLACEMENT Bilateral     TKR    JOINT REPLACEMENT Right     Right THR    UT CYSTOURETHROSCOPY,FULGUR <0 5 CM LESN N/A 2/8/2017    Procedure: CYSTOSCOPY WITH BIOPSIES WITH FULGURATION, INSTILLATION OF MYTOMYCIN, DIALATION OF MEATUS ;  Surgeon: Trena Abbott MD;  Location: [facility] OR;  Service: Urology    WISDOM TOOTH EXTRACTION         CURRENT MEDICATIONS:     Current Outpatient Prescriptions   Medication Sig Dispense Refill    acetaminophen (TYLENOL) 325 mg tablet Take 650 mg by mouth every 6 (six) hours as needed for mild pain      aspirin (ECOTRIN) 325 mg EC tablet Take 1 tablet by mouth      cholecalciferol (VITAMIN D3) 1,000 units tablet Take by mouth      finasteride (PROSCAR) 5 mg tablet TAKE 1 TABLET DAILY   90 tablet 3    nitroglycerin (NITROSTAT) 0 4 mg SL tablet Place 1 tablet (0 4 mg total) under the tongue every 5 (five) minutes as needed for chest pain 5 tablet 2    Omega-3 Fatty Acids (OMEGA 3 PO) Take 2 capsules by mouth daily      Red Yeast Rice Extract (RED YEAST RICE PO) Take 2 capsules by mouth daily      tamsulosin (FLOMAX) 0 4 mg take 1 capsule by mouth twice a day 180 capsule 0    amLODIPine (NORVASC) 2 5 mg tablet Take 1 tablet (2 5 mg total) by mouth daily at bedtime for 90 days 90 tablet 1    fluticasone (FLONASE) 50 mcg/act nasal spray 2 sprays into each nostril daily for 30 days 1 Bottle 5    meloxicam (MOBIC) 15 mg tablet Take 1 tablet (15 mg total) by mouth daily (Patient not taking: Reported on 9/18/2018 ) 14 tablet 0    Methylprednisolone 4 MG TBPK Use as directed on package (Patient not taking: Reported on 9/18/2018 ) 21 tablet 0    Mirabegron ER 25 MG TB24 Take 25 mg by mouth daily for 30 doses 30 tablet 3    valsartan-hydrochlorothiazide (DIOVAN-HCT) 320-25 MG per tablet Take 1 tablet by mouth daily at bedtime for 90 days 90 tablet 1     No current facility-administered medications for this visit  ALLERGIES:   No Known Allergies    SOCIAL HISTORY:     Social History     Social History    Marital status: /Civil Union     Spouse name: N/A    Number of children: N/A    Years of education: N/A     Occupational History    landscaping owner   retired      Social History Main Topics    Smoking status: Former Smoker     Types: Cigarettes, Pipe    Smokeless tobacco: Never Used      Comment: Quit 50 years ago and only smoked for 1 year    Alcohol use Yes      Comment: 2 weekly    Drug use: No    Sexual activity: Not Asked     Other Topics Concern    None     Social History Narrative    Always uses seat belt    Daily caffeine consumption, 2-3 servings a day    Feels safe at home           SOCIAL HISTORY:     Family History   Problem Relation Age of Onset    No Known Problems Mother         Diabetes per [employer]    No Known Problems Father     Skin cancer Brother     Diabetes Brother     Bipolar disorder Other     Diabetes Family     Hypertension Family     Substance Abuse Neg Hx     Alcohol abuse Neg Hx        REVIEW OF SYSTEMS:     Review of Systems   Constitutional: Negative  Eyes: Negative  Respiratory: Negative  Cardiovascular: Negative  Gastrointestinal: Negative  Genitourinary: Negative  Musculoskeletal: Negative  Skin: Negative  Neurological: Negative  Psychiatric/Behavioral: Negative  PHYSICAL EXAM:     /88   Pulse 64   Wt 93 4 kg (206 lb)   BMI 32 14 kg/m²   General:  Healthy appearing individual in no acute distress  They have a normal affect  There is not appear to be any gross neurologic defects or abnormalities    HEENT: Normocephalic, atraumatic  Neck is supple without any palpable lymphadenopathy  Cardiovascular:  Patient has normal palpable distal radial pulses  There is no significant peripheral edema  No JVD is noted  Respiratory:  Patient has unlabored respirations  There is no audible wheeze or rhonchi  Abdomen:  Abdomen is without surgical scars  Abdomen is soft and nontender  There is no tympany  Inguinal and umbilical hernia are not appreciated  Musculoskeletal:  Patient does not have significant CVA tenderness with palpation or percussion  They full range of motion in all 4 extremities  Strength in all 4 extremities appears congruent  Patient is able to ambulate without assistance or difficulty  Dermatologic:  Patient has no skin abnormalities or rashes  LABS:     CBC:   Lab Results   Component Value Date    WBC 5 1 03/12/2018    HGB 17 1 03/12/2018    HCT 49 6 03/12/2018    MCV 92 4 03/12/2018     03/12/2018       BMP:   Lab Results   Component Value Date    GLUCOSE 117 11/05/2015    CALCIUM 10 3 03/12/2018     07/31/2017    K 4 4 07/31/2017    CO2 29 03/12/2018     03/12/2018    BUN 25 03/12/2018    CREATININE 1 49 (H) 03/12/2018     Lab Results   Component Value Date    PSA 0 6 03/12/2018       PATHOLOGY:     2/8/18  Final Diagnosis   A  Mid trigone, bladder (biopsy):     - 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      - Partially denuded urothelial mucosa  - No malignancy identified in histologic sections  PROCEDURE:     No results found for this or any previous visit (from the past 2 hour(s))  ASSESSMENT:     70 y o  male with BPH and incontinence    PLAN:     Patient is doing very well with the addition of Myrbetriq  This puts him on double-dose Flomax, Proscar and 25 mg of the beta 3 agonist   Although we have some room to increase the dosage of the Myrbetriq, the patient is on maximal medical therapy  Should symptoms worsen, would consider increasing the Myrbetriq or moving toward surgical therapy  Follow-up in 6 months or sooner if the patient has more issues

## 2018-09-25 DIAGNOSIS — J30.1 ALLERGIC RHINITIS DUE TO POLLEN, UNSPECIFIED SEASONALITY: ICD-10-CM

## 2018-09-25 RX ORDER — FLUTICASONE PROPIONATE 50 MCG
2 SPRAY, SUSPENSION (ML) NASAL DAILY
Qty: 1 BOTTLE | Refills: 0 | Status: SHIPPED | OUTPATIENT
Start: 2018-09-25 | End: 2018-11-19 | Stop reason: SDUPTHER

## 2018-10-08 DIAGNOSIS — I10 BENIGN ESSENTIAL HYPERTENSION: ICD-10-CM

## 2018-10-08 RX ORDER — VALSARTAN AND HYDROCHLOROTHIAZIDE 320; 25 MG/1; MG/1
TABLET, FILM COATED ORAL
Qty: 90 TABLET | Refills: 1 | Status: SHIPPED | OUTPATIENT
Start: 2018-10-08 | End: 2019-11-21 | Stop reason: SDUPTHER

## 2018-10-19 ENCOUNTER — TELEPHONE (OUTPATIENT)
Dept: FAMILY MEDICINE CLINIC | Facility: CLINIC | Age: 78
End: 2018-10-19

## 2018-10-19 NOTE — TELEPHONE ENCOUNTER
No  Only certain manufacturers  were implicated with the side effects  He should continue taking medication

## 2018-10-19 NOTE — TELEPHONE ENCOUNTER
Pt called, he saw on the news that valsartan that he has been on for 10 years causes liver and kidney damage  Should he be taking something else?  Call [phone]

## 2018-10-26 ENCOUNTER — TELEPHONE (OUTPATIENT)
Dept: NEUROLOGY | Facility: CLINIC | Age: 78
End: 2018-10-26

## 2018-10-26 ENCOUNTER — OFFICE VISIT (OUTPATIENT)
Dept: FAMILY MEDICINE CLINIC | Facility: CLINIC | Age: 78
End: 2018-10-26
Payer: COMMERCIAL

## 2018-10-26 VITALS
BODY MASS INDEX: 32.18 KG/M2 | RESPIRATION RATE: 17 BRPM | HEART RATE: 64 BPM | SYSTOLIC BLOOD PRESSURE: 134 MMHG | DIASTOLIC BLOOD PRESSURE: 76 MMHG | TEMPERATURE: 96.9 F | WEIGHT: 205 LBS | OXYGEN SATURATION: 98 % | HEIGHT: 67 IN

## 2018-10-26 DIAGNOSIS — N40.1 BENIGN PROSTATIC HYPERPLASIA WITH URINARY FREQUENCY: ICD-10-CM

## 2018-10-26 DIAGNOSIS — R09.81 CHRONIC NASAL CONGESTION: ICD-10-CM

## 2018-10-26 DIAGNOSIS — R41.3 MEMORY PROBLEM: ICD-10-CM

## 2018-10-26 DIAGNOSIS — E11.8 TYPE 2 DIABETES MELLITUS WITH COMPLICATION, UNSPECIFIED WHETHER LONG TERM INSULIN USE: Primary | ICD-10-CM

## 2018-10-26 DIAGNOSIS — E78.2 MIXED HYPERLIPIDEMIA: ICD-10-CM

## 2018-10-26 DIAGNOSIS — R35.0 BENIGN PROSTATIC HYPERPLASIA WITH URINARY FREQUENCY: ICD-10-CM

## 2018-10-26 DIAGNOSIS — I10 BENIGN ESSENTIAL HYPERTENSION: ICD-10-CM

## 2018-10-26 LAB
ALBUMIN SERPL BCP-MCNC: 3.9 G/DL (ref 3.5–5)
ALP SERPL-CCNC: 38 U/L (ref 46–116)
ALT SERPL W P-5'-P-CCNC: 22 U/L (ref 12–78)
ANION GAP SERPL CALCULATED.3IONS-SCNC: 6 MMOL/L (ref 4–13)
AST SERPL W P-5'-P-CCNC: 13 U/L (ref 5–45)
BILIRUB SERPL-MCNC: 0.53 MG/DL (ref 0.2–1)
BUN SERPL-MCNC: 22 MG/DL (ref 5–25)
CALCIUM SERPL-MCNC: 10.1 MG/DL (ref 8.3–10.1)
CHLORIDE SERPL-SCNC: 104 MMOL/L (ref 100–108)
CHOLEST SERPL-MCNC: 194 MG/DL (ref 50–200)
CO2 SERPL-SCNC: 27 MMOL/L (ref 21–32)
CREAT SERPL-MCNC: 1.29 MG/DL (ref 0.6–1.3)
CREAT UR-MCNC: 136 MG/DL
EST. AVERAGE GLUCOSE BLD GHB EST-MCNC: 143 MG/DL
GFR SERPL CREATININE-BSD FRML MDRD: 53 ML/MIN/1.73SQ M
GLUCOSE P FAST SERPL-MCNC: 107 MG/DL (ref 65–99)
HBA1C MFR BLD: 6.6 % (ref 4.2–6.3)
HDLC SERPL-MCNC: 53 MG/DL (ref 40–60)
LDLC SERPL CALC-MCNC: 114 MG/DL (ref 0–100)
LEFT EYE IMAGE QUALITY: NORMAL
MICROALBUMIN UR-MCNC: 6.1 MG/L (ref 0–20)
MICROALBUMIN/CREAT 24H UR: 4 MG/G CREATININE (ref 0–30)
POTASSIUM SERPL-SCNC: 4.1 MMOL/L (ref 3.5–5.3)
PROT SERPL-MCNC: 7.3 G/DL (ref 6.4–8.2)
RIGHT EYE IMAGE QUALITY: NORMAL
SEVERITY (EYE EXAM): NORMAL
SODIUM SERPL-SCNC: 137 MMOL/L (ref 136–145)
TRIGL SERPL-MCNC: 136 MG/DL
TSH SERPL DL<=0.05 MIU/L-ACNC: 1.16 UIU/ML (ref 0.36–3.74)

## 2018-10-26 PROCEDURE — 80061 LIPID PANEL: CPT | Performed by: FAMILY MEDICINE

## 2018-10-26 PROCEDURE — 84443 ASSAY THYROID STIM HORMONE: CPT | Performed by: FAMILY MEDICINE

## 2018-10-26 PROCEDURE — 4040F PNEUMOC VAC/ADMIN/RCVD: CPT | Performed by: FAMILY MEDICINE

## 2018-10-26 PROCEDURE — 3078F DIAST BP <80 MM HG: CPT | Performed by: FAMILY MEDICINE

## 2018-10-26 PROCEDURE — 36415 COLL VENOUS BLD VENIPUNCTURE: CPT | Performed by: FAMILY MEDICINE

## 2018-10-26 PROCEDURE — 99214 OFFICE O/P EST MOD 30 MIN: CPT | Performed by: FAMILY MEDICINE

## 2018-10-26 PROCEDURE — 3075F SYST BP GE 130 - 139MM HG: CPT | Performed by: FAMILY MEDICINE

## 2018-10-26 PROCEDURE — 82043 UR ALBUMIN QUANTITATIVE: CPT | Performed by: FAMILY MEDICINE

## 2018-10-26 PROCEDURE — 83036 HEMOGLOBIN GLYCOSYLATED A1C: CPT | Performed by: FAMILY MEDICINE

## 2018-10-26 PROCEDURE — 82570 ASSAY OF URINE CREATININE: CPT | Performed by: FAMILY MEDICINE

## 2018-10-26 PROCEDURE — 80053 COMPREHEN METABOLIC PANEL: CPT | Performed by: FAMILY MEDICINE

## 2018-10-26 PROCEDURE — 92250 FUNDUS PHOTOGRAPHY W/I&R: CPT | Performed by: FAMILY MEDICINE

## 2018-10-26 NOTE — PROGRESS NOTES
Assessment/Plan:    Benign essential hypertension   Reasonably controlled on valsartan /25 and amlodipine 2 5  Will continue to monitor    Benign prostatic hyperplasia   Continue follow-up with Urology    DM2 (diabetes mellitus, type 2) ([facility] )  Lab Results   Component Value Date    HGBA1C 6 1 (H) 03/12/2018       No results for input(s): POCGLU in the last 72 hours  Blood Sugar Average: Last 72 hrs:   last A1c 6 1%  Will repeat today  Will check IRIS today  Monofilament negative today    Hyperlipidemia   Doing well on red yeast rice and Omega 3  Memory problem   Patient still with ongoing memory problems  I had referred him to Neurology but he had to cancel his appointment will issue another referral today    Chronic nasal congestion   Will refer to ENT, Dr Roberto Carlos Harris       Diagnoses and all orders for this visit:    Type 2 diabetes mellitus with complication, unspecified whether long term insulin use (HCC)  -     Comprehensive metabolic panel  -     Hemoglobin A1C  -     Microalbumin / creatinine urine ratio  -     TSH, 3rd generation with Free T4 reflex  -     POCT diabetic eye exam    Benign essential hypertension    Mixed hyperlipidemia  -     Lipid Panel with Direct LDL reflex    Benign prostatic hyperplasia with urinary frequency    Memory problem  -     Ambulatory referral to Neurology; Future    Chronic nasal congestion  -     Ambulatory Referral to Otolaryngology; Future       Patient had flu shot   IRIS today   will draw fasting blood work today  Will call   6 months, AWV,  Appointment and fasting blood work    Subjective:      Patient ID: Melissa Newsome is a 70 y o  male  Recheck chronic medical problems today  Overall doing well  Still having some problems with his memory  He has not seen Neurology recently  Chronic congestion  Doing well on blood pressure medications valsartan and amlodipine    Patient is fasting this morning        The following portions of the patient's history were reviewed and updated as appropriate: allergies, current medications, past family history, past medical history, past social history, past surgical history and problem list     Review of Systems   Respiratory: Negative  Cardiovascular: Negative  Gastrointestinal: Negative  Genitourinary: Negative  Objective:  Patient's shoes and socks removed  Right Foot/Ankle   Right Foot Inspection  Skin Exam: skin normal and skin intact no dry skin, no warmth, no callus, no erythema, no maceration, no abnormal color, no pre-ulcer, no ulcer and no callus                          Toe Exam: ROM and strength within normal limits  Sensory   Vibration: intact  Proprioception: intact   Monofilament testing: intact  Vascular  Capillary refills: < 3 seconds  The right DP pulse is 2+  The right PT pulse is 2+  Left Foot/Ankle  Left Foot Inspection  Skin Exam: skin normal and skin intactno dry skin, no warmth, no erythema, no maceration, normal color, no pre-ulcer, no ulcer and no callus                         Toe Exam: ROM and strength within normal limits                   Sensory   Vibration: intact  Proprioception: intact  Monofilament: intact  Vascular  Capillary refills: < 3 seconds  The left DP pulse is 2+  The left PT pulse is 2+  Assign Risk Category:  No deformity present; No loss of protective sensation; No weak pulses       Risk: 0      /76   Pulse 64   Temp (!) 96 9 °F (36 1 °C) (Tympanic)   Resp 17   Ht 5' 7" (1 702 m)   Wt 93 kg (205 lb)   SpO2 98%   BMI 32 11 kg/m²          Physical Exam   Cardiovascular: Normal rate, regular rhythm, normal heart sounds and intact distal pulses  Pulses are no weak pulses  Pulses:       Dorsalis pedis pulses are 2+ on the right side, and 2+ on the left side  Posterior tibial pulses are 2+ on the right side, and 2+ on the left side  Carotids: no bruits  Ext: no edema   Pulmonary/Chest: Effort normal  No respiratory distress   He has no wheezes  He has no rales  Feet:   Right Foot:   Skin Integrity: Negative for ulcer, skin breakdown, erythema, warmth, callus or dry skin  Left Foot:   Skin Integrity: Negative for ulcer, skin breakdown, erythema, warmth, callus or dry skin  Psychiatric: He has a normal mood and affect   His behavior is normal  Thought content normal

## 2018-10-26 NOTE — ASSESSMENT & PLAN NOTE
Patient still with ongoing memory problems    I had referred him to Neurology but he had to cancel his appointment will issue another referral today

## 2018-10-26 NOTE — ASSESSMENT & PLAN NOTE
Lab Results   Component Value Date    HGBA1C 6 1 (H) 03/12/2018       No results for input(s): POCGLU in the last 72 hours  Blood Sugar Average: Last 72 hrs:   last A1c 6 1%  Will repeat today  Will check IRIS today    Monofilament negative today

## 2018-10-29 ENCOUNTER — TELEPHONE (OUTPATIENT)
Dept: FAMILY MEDICINE CLINIC | Facility: CLINIC | Age: 78
End: 2018-10-29

## 2018-10-29 ENCOUNTER — TELEPHONE (OUTPATIENT)
Dept: NEUROLOGY | Facility: CLINIC | Age: 78
End: 2018-10-29

## 2018-10-29 NOTE — TELEPHONE ENCOUNTER
I called Abbie Lake back and advised him I was having some issues faxing his lab work it said it was busy I did inform him I had a few patient's whom I needed to help and I informed I wilt ry to keep faxing and if it doesn't work I will call him within the hour if I continue to have problems  I called Abbie Lake he stated he apologized and his wife discontented the nubmer since it was no longer needed  I did e-mail via fax  To his e-mail which is [email or web address]

## 2018-10-30 NOTE — PROGRESS NOTES
DEPARTMENT OF NEUROLOGICAL SCIENCES  [address] DISORDERS [facility]        NEW PATIENT EVALUATION NOTE    Patient: Chad Fuentes Record Number: # [id number]  YOB: 1940  Date of visit: 10/31/2018    Referring provider: Keegan Louis DO    Diagnoses for this encounter:  1  Mild cognitive impairment with memory loss  Ambulatory referral to Neurology    Ceruloplasmin    Copper Level    Vitamin B12    Magnesium    RPR, Rfx Qn RPR/Confirm TP    Vitamin D 25 hydroxy    MRI brain without contrast     ASSESSMENT     Impression of this 69 yo M with <1 year hx of increased forgetfulness  At baseline he had not been very organized but this had become worse recently  I discussed with him that for now he most resembles having a mild cognitive impairment with some issues with recall  MOCA of 24/30 with mainly problems in word recall (0/5 words)  He is able to accomplish most of his daily activities well except for missing some payments  He is no longer working and this lack of structured activity is something he feels is not helping his memory  I encouraged keeping more active in his care home, setting goals and writing down tasks, volunteering outside of home and maintaining social connections  I believe this would benefit him substantially  Nonfocal physical examination with no strong signs of parkinsonism except mild hypomimia and questionable rest tremor in the right hand on walking  May check with Neuropsychologist if his problems should worsen  PLAN     · Reviewed recent CMP, CBC, TSH, HbA1c  Will add on the above blood tests for reversible contributors to cognitive loss  · Will obtain MRI brain wo contrast to r/o structural etiologies  · Will not start on any new medication at this time   Endorsed increased regimented physical activity for general health, making schedule for daily activities for better organization and handling of tasks, writing down tasks on paper and reminder alerts on phone devices  · Will refer to Neuropsychology if his deficits should progress  ·    SOME HELPFUL SUGGESTIONS:   - Mindfulness matters  The more you reflect on an experience, the more it will endure  - The hippocampus (the part of the brain involved in memory) responds to novelty: Expose yourself to information in different ways to enhance novelty and to broaden the neural representation of the event  - Information may exceed your attention span: limit the amount you learn at one time    - Organize according to categories (eg a list of groceries = dairy, fruits, meats)  - Active participation in conversations reinforces details of the discussion    - Associate new information with old information to establish a network of durable memories  EXTERNAL MEMORY AIDS:  - Use a notebook or technology (phone based roberto) to maintain schedules, calendar and "to do" lists  There are a number of well-received smartphone applications to help with memory and executive functions   - Evernote: This roberto is a Running "notebook" that does not reza to be organized and can recognize text from pictures, business cards, and appointment books according to the date of entry  Retrieval of information is simple and done via a single search bar at the top of the screen  - Remember the Milk: This roberto allows the user to create grocery and other "to-do" lists that can be prioritized according to importance, with reminders that can be jose alberto-tagged by location (ie if you have "buy stamps" on your list and are passing a post-office, your phone will receive a message from the roberto)  - use a "Memory Table" in your home - make a habit of putting your glasses, keys, wallet ect in a central location on a consistent basis  - Pill organizers are useful to keep track of medication use  - Try using a white board at home to remind yourself of upcoming daily or weekly tasks     ·   · Thank you very much for sending me this interesting patient  · The patient has been instructed to call us about any new neurological problems or medication side effects  · Return to Clinic on 2-3 months    A total of 60 minutes were spent face-to-face with this patient, of which at least 50% was spent on counseling and coordination of care  We discussed the natural history of the patient's condition, differential diagnosis, level of diagnostic certainty, treatment alternatives and their side effects and possible complications  HISTORY OF PRESENT ILLNESS:     Mr Stevie Reid is a 70 y o  right handed male who has been referred to the [address] [place] [address] for evaluation of memory problems  The patient was not accompanied today  History was obtained from patient  He feels he first noticed issues between 9-12 months ago in late 2017 - early 2018 and noticing forgetfulness  He states he had had difficulty recalling directions to several previously familiar locales  He has missed paying several of his bills, forgetting to timely perform yardwork maintenance  At first he thought it was related to age, but he is now concerned since now occurring more often and to a wider variety of situations  At times he says he may ask the same question twice in the same conversation  Denies issues with processing language, speaking, losing personal items more often than before (he admits being not very organized at baseline)  Denies any safety-related concerns  He is able to be reminded of things he forgets easily  Denies issues recognizing people or places he was familiar with  No long-term memory concerns  He admits not being as active physically as before  Has no concerns about his sleeping  Denies any personality changes (being irritable, more apathetic, etc)  Denies anxiety or depression  He says he feels very unaccustomed to being retired and having none of his formerly scheduled life  Living Condition and Site: 2nd marriage   Living at own home with wife  Owned a landscaping business formerly, sold the business a few years ago  ADLs: can dress, feed, toilet, and bathe himself  IADLs: he cooks, shops  Can drive without safety concerns, but wife pays the household bills but he had been handling the business side of things  Hyposmia: mild  RBD (REM semi-purposeful body movements): denies  Gait Disturbance:  none  Dementia:  See above  MOCA: 24/30 in 10/31/18  Hallucination: denies  Exercise Therapy:  Spends most days watching TV or socializing with friends    Family History: Number of First Degree Relatives With Parkinsonism: none, and none for Dementia  Imaging: none    REVIEW OF PAST MEDICAL, SOCIAL AND FAMILY HISTORY:  This is the list of problems as per our Medical Records:    Patient Active Problem List    Diagnosis Date Noted    Chronic nasal congestion 10/26/2018    Hypertension 07/11/2018    Olecranon bursitis of left elbow 05/18/2018    Memory problem 04/26/2018    Allergic rhinitis 03/08/2018    Primary insomnia 08/30/2017    Seborrheic keratoses, inflamed 07/19/2017    Symptomatic bradycardia 07/20/2016    Osteoarthritis of right hip 05/02/2016    Bladder calculus 10/30/2015    Benign essential hypertension 03/20/2015    Arteriosclerosis of coronary artery 04/29/2014    DM2 (diabetes mellitus, type 2) ([facility] [address] ) 04/29/2014    Vitamin D deficiency 04/17/2014    Benign prostatic hyperplasia 06/04/2012    Hyperlipidemia 06/04/2012       Past Medical History:   Diagnosis Date    Abnormal blood chemistry     Last Assessed: 7/19/2013    Abnormal glucose     Last Assessed: 11/6/2015    Arthritis     Bladder tumor     BPH (benign prostatic hypertrophy)     Cataracts, bilateral     "start of"    Coronary artery disease     2 stents   MI x2    Diabetes mellitus ([facility] [address] )     Diet controlled pre-diabetic    Erectile dysfunction of non-organic origin     Last Assessed: 5/9/2014    Fatigue     Resolved: 2/23/2015    Glaucoma     "Start of"    Hematuria, microscopic     Last Assessed: 11/7/2016    History of bladder stone     Hyperlipidemia     Hypertension     Knee pain, bilateral     If walking far  No assistive devices    Myocardial infarction [facility]) 2011, 2012    x2    Primary insomnia     Last Assessed; 7/20/2016    Recurrent right inguinal hernia     Last Assessed: 2/23/2015    Urinary incontinence     Urinary urgency     Last Assessed: 10/30/2015    Wears glasses         Past Surgical History:   Procedure Laterality Date    BLADDER STONE REMOVAL      Laser procedure    CARDIAC CATHETERIZATION  2003    RCA stenting and 2004 stenting to circuflex    COLONOSCOPY      CORONARY ANGIOPLASTY WITH STENT PLACEMENT      Has 2 stents    CYSTOSCOPY  11/11/2015    w/ Cystolitholapaxy    HERNIA REPAIR Bilateral 03/05/2015    Inguinal    JOINT REPLACEMENT Bilateral     TKR    JOINT REPLACEMENT Right     Right THR    IA CYSTOURETHROSCOPY,FULGUR <0 5 CM LESN N/A 2/8/2017    Procedure: CYSTOSCOPY WITH BIOPSIES WITH FULGURATION, INSTILLATION OF MYTOMYCIN, DIALATION OF MEATUS ;  Surgeon: Ceasar Perera MD;  Location: [facility] OR;  Service: Urology    WISDOM TOOTH EXTRACTION          No Known Allergies     Outpatient Encounter Prescriptions as of 10/31/2018   Medication Sig Dispense Refill    acetaminophen (TYLENOL) 325 mg tablet Take 650 mg by mouth every 6 (six) hours as needed for mild pain      aspirin (ECOTRIN) 325 mg EC tablet Take 1 tablet by mouth      cholecalciferol (VITAMIN D3) 1,000 units tablet Take by mouth      finasteride (PROSCAR) 5 mg tablet TAKE 1 TABLET DAILY   90 tablet 3    meloxicam (MOBIC) 15 mg tablet Take 1 tablet (15 mg total) by mouth daily 14 tablet 0    nitroglycerin (NITROSTAT) 0 4 mg SL tablet Place 1 tablet (0 4 mg total) under the tongue every 5 (five) minutes as needed for chest pain 5 tablet 2    Omega-3 Fatty Acids (OMEGA 3 PO) Take 2 capsules by mouth daily      Red Yeast Rice Extract (RED YEAST RICE PO) Take 2 capsules by mouth daily      tamsulosin (FLOMAX) 0 4 mg take 1 capsule by mouth twice a day 180 capsule 0    valsartan-hydrochlorothiazide (DIOVAN-HCT) 320-25 MG per tablet take 1 tablet by mouth at bedtime 90 tablet 1    amLODIPine (NORVASC) 2 5 mg tablet Take 1 tablet (2 5 mg total) by mouth daily at bedtime for 90 days 90 tablet 1    fluticasone (FLONASE) 50 mcg/act nasal spray 2 sprays into each nostril daily for 30 days 1 Bottle 0    Methylprednisolone 4 MG TBPK Use as directed on package (Patient not taking: Reported on 10/31/2018 ) 21 tablet 0    Mirabegron ER 25 MG TB24 Take 25 mg by mouth daily for 30 doses 30 tablet 3     No facility-administered encounter medications on file as of 10/31/2018  Social History   Substance Use Topics    Smoking status: Former Smoker     Types: Cigarettes, Pipe    Smokeless tobacco: Never Used      Comment: Quit 50 years ago and only smoked for 1 year    Alcohol use 1 2 oz/week     2 Shots of liquor per week      Comment: 2 shots of liquor weekly        Family History   Problem Relation Age of Onset    No Known Problems Mother         Diabetes per [employer]    No Known Problems Father     Skin cancer Brother     Diabetes Brother     Bipolar disorder Other     Diabetes Family     Hypertension Family     Substance Abuse Neg Hx     Alcohol abuse Neg Hx         REVIEW OF SYSTEMS:  The patient has entered data on an intake form regarding present illness, past medical and surgical history, medications, allergies, family and social history, and a full review of 14 systems  I have reviewed this form with the patient, and all the relevant information has been included on this note  The full review of systems was negative except as stated in HPI and below  Constitutional: Negative  Negative for appetite change and fever  HENT: Negative  Negative for hearing loss, tinnitus, trouble swallowing and voice change  Eyes: Negative  Negative for photophobia and pain  Respiratory: Negative  Negative for shortness of breath  Cardiovascular: Negative  Negative for palpitations  Gastrointestinal: Negative  Negative for nausea  Endocrine: Negative  Negative for cold intolerance and heat intolerance  Genitourinary: Negative  Negative for dysuria, frequency and urgency  Musculoskeletal: Negative  Negative for myalgias and neck pain  Skin: Negative  Allergic/Immunologic: Negative  Neurological: Negative  Negative for dizziness, tremors, seizures, syncope, facial asymmetry, speech difficulty, weakness and numbness  Memory problems    Hematological: Negative  Does not bruise/bleed easily  Psychiatric/Behavioral: Positive for confusion  Negative for hallucinations  PHYSICAL EXAMINATION:     Vital signs:  /72 (BP Location: Right arm, Patient Position: Sitting, Cuff Size: Standard)   Pulse 57   Ht 5' 7" (1 702 m)   Wt 94 5 kg (208 lb 6 4 oz)   BMI 32 64 kg/m²     General:  Well-appearing, well nourished, pleasant patient in no acute distress  Mood and Fund of Knowledge are appropriate  Head:  Normocephalic, atraumatic  Oropharynx and conjunctiva are clear  Speech  No hypophonia or bradylalia  No scanning speech  Language: Comprehension intact  Neck:  Supple, strong 5/5 forward flexion and retroflexion  Extremities: Range of motion is normal       Cognitive and Mental Exam:  MOCA      Points MAX   Visuospatial  ----------   Trails A 1 1   Cube Drawing 0 1   Clock Drawing 3 3   Naming Objects 3 3   Attention  ----------   Digit Span 2 2   Letter Reading 1 1   Serial 7s 3 3   Language  ----------   Repetition 2 2   Fluency 0 1   Abstraction 2 2   Delayed Recall 0 5   Orientation                 6             6              23 30    +1 for high school education = 24     Cranial Nerves:  Funduscopic examination reveals no papilledema    Direct and consensual light reflexes were normal  No afferent pupillary defect  Visual fields are full to confrontation  Extraocular movements were full, with normal pursuit and saccades  Pupils were equal, reactive to light symmetrically  Facial sensation to light touch was intact  Face is symmetric with normal strength  Hearing was assessed using the Calibrated Finger Rub Auditory Screening Test (CALFRAST) and was not abnormal (Better than CALFRAST-Strong-70)  Palate is up going bilaterally and symmetrically  Neck muscles are strong  Tongue protrusion is at midline with normal movements  No dysarthria  Motor:    Dystonia: both hands held pronated in front of body when walking  Dyskinesia: none  Myoclonus: none  Chorea: none  Tics: none  MDS-UPDRS III:   Speech: 1  Facial Expression: 1  Tremor (Head):  0  Rest Tremor Severity (RUE/LUE/RLE/LLE/Lip): 0/0/0/0/0  Action Tremor of Hands (R): 0  Action Tremor of Hands (L): 0  Finger tapping (R): 1  Finger tapping (L): 1  Hand clenching (R): 0  Hand clenching (L): 1  JALEN Hand (R): 1  JALEN Hand (L): 1  Toe Tapping (R):  0  Toe Tapping (L):  0  Leg Agility (R):  0  Leg Agility (L):  1  Rigidity (Neck): 1  Rigidity (RUE): 1  Rigidity (LUE): 0  Rigidity (RLE): 0  Rigidity (LLE): 0  Arising From Chair: 0  Posture: 0  Gait: 0  Freezing of Gait: 0  Postural Stability: 0  Body Bradykinesia: 1  -------------------------------------------------------------------------------------  11    Muscle Strength Right Left  Muscle Strength Right Left   Deltoid 5/5 5/5  Hip Adductors 5/5 5/5   Biceps 5/5 5/5  Hip Abductors 5/5 5/5   Triceps 5/5 5/5  Knee Extensors 5/5 5/5   Wrist Extensors 5/5 5/5  Knee Flexors 5/5 5/5   Wrist Flexors 5/5 5/5  Ankle Extensors 5/5 5/5    5/5 5/5  Ankle Flexors 5/5 5/5   Finger Abductors 5/5 5/5       Hip Flexors 5/5 5/5   Hip Extensors 5/5 5/5     Sensory  Intact to Light Touch on LEs but less relatively in the hands  vibration sense decreased in both feet up to the knees  Coordination:  Finger-to-nose-finger: normal     Gait:  Normal comprehensive gait evaluation, has normal raising, stance, gait, turns, tandem gait, tip-toes, heels and Pull test normal (2 steps back)  Inconsistent R hand wrist flex-ext tremor  Reflexes:     Right Left   Biceps 2/4 2/4   Brachioradialis 2/4 2/4   Triceps 2/4 2/4   Knee 2/4 2/4   Ankle 2/4 2/4      Plantar cutaneous reflex:  Right: extensor  Left: extensor      REVIEW OF ANCILLARY TESTS:   No results found for this or any previous visit

## 2018-10-31 ENCOUNTER — OFFICE VISIT (OUTPATIENT)
Dept: NEUROLOGY | Facility: CLINIC | Age: 78
End: 2018-10-31
Payer: COMMERCIAL

## 2018-10-31 VITALS
SYSTOLIC BLOOD PRESSURE: 148 MMHG | HEART RATE: 57 BPM | DIASTOLIC BLOOD PRESSURE: 72 MMHG | HEIGHT: 67 IN | BODY MASS INDEX: 32.71 KG/M2 | WEIGHT: 208.4 LBS

## 2018-10-31 DIAGNOSIS — G31.84 MILD COGNITIVE IMPAIRMENT WITH MEMORY LOSS: Primary | ICD-10-CM

## 2018-10-31 PROCEDURE — 99205 OFFICE O/P NEW HI 60 MIN: CPT | Performed by: PSYCHIATRY & NEUROLOGY

## 2018-10-31 NOTE — LETTER
October 31, 2018     Stefani Rangel DO  [address]    Patient: Benitez Banuelos   YOB: 1940   Date of Visit: 10/31/2018       Dear Dr Nunes Human:    Thank you for referring Zeferino Tipton to me for evaluation  Below are my notes for this consultation  If you have questions, please do not hesitate to call me  I look forward to following your patient along with you  Sincerely,        Dionisio Duncan MD        CC: No Recipients  Herbert Mondragon  10/31/2018  2:28 PM  Signed  Review of Systems   Constitutional: Negative  Negative for appetite change and fever  HENT: Negative  Negative for hearing loss, tinnitus, trouble swallowing and voice change  Eyes: Negative  Negative for photophobia and pain  Respiratory: Negative  Negative for shortness of breath  Cardiovascular: Negative  Negative for palpitations  Gastrointestinal: Negative  Negative for nausea  Endocrine: Negative  Negative for cold intolerance and heat intolerance  Genitourinary: Negative  Negative for dysuria, frequency and urgency  Musculoskeletal: Negative  Negative for myalgias and neck pain  Skin: Negative  Allergic/Immunologic: Negative  Neurological: Negative  Negative for dizziness, tremors, seizures, syncope, facial asymmetry, speech difficulty, weakness and numbness  Memory problems     Hematological: Negative  Does not bruise/bleed easily  Psychiatric/Behavioral: Positive for confusion  Negative for hallucinations  Dionisio Duncan MD  10/31/2018  2:28 PM  Signed  DEPARTMENT OF NEUROLOGICAL SCIENCES  [address] DISORDERS [facility]        NEW PATIENT EVALUATION NOTE    Patient: Chad Fuentes Record Number: # [id number]  YOB: 1940  Date of visit: 10/31/2018    Referring provider: Max Esquivel DO    Diagnoses for this encounter:  1   Mild cognitive impairment with memory loss  Ambulatory referral to Neurology    Ceruloplasmin    Copper Level    Vitamin B12    Magnesium    RPR, Rfx Qn RPR/Confirm TP    Vitamin D 25 hydroxy    MRI brain without contrast     ASSESSMENT     Impression of this 71 yo M with <1 year hx of increased forgetfulness  At baseline he had not been very organized but this had become worse recently  I discussed with him that for now he most resembles having a mild cognitive impairment with some issues with recall  MOCA of 24/30 with mainly problems in word recall (0/5 words)  He is able to accomplish most of his daily activities well except for missing some payments  He is no longer working and this lack of structured activity is something he feels is not helping his memory  I encouraged keeping more active in his [facility], setting goals and writing down tasks, volunteering outside of home and maintaining social connections  I believe this would benefit him substantially  Nonfocal physical examination with no strong signs of parkinsonism except mild hypomimia and questionable rest tremor in the right hand on walking  May check with Neuropsychologist if his problems should worsen  PLAN     · Reviewed recent CMP, CBC, TSH, HbA1c  Will add on the above blood tests for reversible contributors to cognitive loss  · Will obtain MRI brain wo contrast to r/o structural etiologies  · Will not start on any new medication at this time  Endorsed increased regimented physical activity for general health, making schedule for daily activities for better organization and handling of tasks, writing down tasks on paper and reminder alerts on phone devices  · Will refer to Neuropsychology if his deficits should progress  ·    SOME HELPFUL SUGGESTIONS:   - Mindfulness matters  The more you reflect on an experience, the more it will endure     - The hippocampus (the part of the brain involved in memory) responds to novelty: Expose yourself to information in different ways to enhance novelty and to broaden the neural representation of the event  - Information may exceed your attention span: limit the amount you learn at one time    - Organize according to categories (eg a list of groceries = dairy, fruits, meats)  - Active participation in conversations reinforces details of the discussion    - Associate new information with old information to establish a network of durable memories  EXTERNAL MEMORY AIDS:  - Use a notebook or technology (phone based roberto) to maintain schedules, calendar and "to do" lists  There are a number of well-received smartphone applications to help with memory and executive functions   - Evernote: This roberto is a Running "notebook" that does not reza to be organized and can recognize text from pictures, business cards, and appointment books according to the date of entry  Retrieval of information is simple and done via a single search bar at the top of the screen  - Remember the Milk: This roberto allows the user to create grocery and other "to-do" lists that can be prioritized according to importance, with reminders that can be jose alberto-tagged by location (ie if you have "buy stamps" on your list and are passing a post-office, your phone will receive a message from the roberto)  - use a "Memory Table" in your home - make a habit of putting your glasses, keys, wallet ect in a central location on a consistent basis  - Pill organizers are useful to keep track of medication use  - Try using a white board at home to remind yourself of upcoming daily or weekly tasks  ·   · Thank you very much for sending me this interesting patient  · The patient has been instructed to call us about any new neurological problems or medication side effects  · Return to Clinic on 2-3 months    A total of 60 minutes were spent face-to-face with this patient, of which at least 50% was spent on counseling and coordination of care   We discussed the natural history of the patient's condition, differential diagnosis, level of diagnostic certainty, treatment alternatives and their side effects and possible complications  HISTORY OF PRESENT ILLNESS:     Mr Mis Patel is a 70 y o  right handed male who has been referred to the [address] [facility] for evaluation of memory problems  The patient was not accompanied today  History was obtained from patient  He feels he first noticed issues between 9-12 months ago in late 2017 - early 2018 and noticing forgetfulness  He states he had had difficulty recalling directions to several previously familiar locales  He has missed paying several of his bills, forgetting to timely perform yardwork maintenance  At first he thought it was related to age, but he is now concerned since now occurring more often and to a wider variety of situations  At times he says he may ask the same question twice in the same conversation  Denies issues with processing language, speaking, losing personal items more often than before (he admits being not very organized at baseline)  Denies any safety-related concerns  He is able to be reminded of things he forgets easily  Denies issues recognizing people or places he was familiar with  No long-term memory concerns  He admits not being as active physically as before  Has no concerns about his sleeping  Denies any personality changes (being irritable, more apathetic, etc)  Denies anxiety or depression  He says he feels very unaccustomed to being retired and having none of his formerly scheduled life  Living Condition and Site: 2nd marriage  Living at own home with wife  Owned a landscaping business formerly, sold the business a few years ago  ADLs: can dress, feed, toilet, and bathe himself  IADLs: he cooks, shops  Can drive without safety concerns, but wife pays the household bills but he had been handling the business side of things       Hyposmia: mild  RBD (REM semi-purposeful body movements): denies  Gait Disturbance: none  Dementia:  See above  MOCA: 24/30 in 10/31/18  Hallucination: denies  Exercise Therapy:  Spends most days watching TV or socializing with friends    Family History: Number of First Degree Relatives With Parkinsonism: none, and none for Dementia  Imaging: none    REVIEW OF PAST MEDICAL, SOCIAL AND FAMILY HISTORY:  This is the list of problems as per our Medical Records:    Patient Active Problem List    Diagnosis Date Noted    Chronic nasal congestion 10/26/2018    Hypertension 07/11/2018    Olecranon bursitis of left elbow 05/18/2018    Memory problem 04/26/2018    Allergic rhinitis 03/08/2018    Primary insomnia 08/30/2017    Seborrheic keratoses, inflamed 07/19/2017    Symptomatic bradycardia 07/20/2016    Osteoarthritis of right hip 05/02/2016    Bladder calculus 10/30/2015    Benign essential hypertension 03/20/2015    Arteriosclerosis of coronary artery 04/29/2014    DM2 (diabetes mellitus, type 2) ([facility] [address] ) 04/29/2014    Vitamin D deficiency 04/17/2014    Benign prostatic hyperplasia 06/04/2012    Hyperlipidemia 06/04/2012       Past Medical History:   Diagnosis Date    Abnormal blood chemistry     Last Assessed: 7/19/2013    Abnormal glucose     Last Assessed: 11/6/2015    Arthritis     Bladder tumor     BPH (benign prostatic hypertrophy)     Cataracts, bilateral     "start of"    Coronary artery disease     2 stents  MI x2    Diabetes mellitus ([facility] [address] )     Diet controlled pre-diabetic    Erectile dysfunction of non-organic origin     Last Assessed: 5/9/2014    Fatigue     Resolved: 2/23/2015    Glaucoma     "Start of"    Hematuria, microscopic     Last Assessed: 11/7/2016    History of bladder stone     Hyperlipidemia     Hypertension     Knee pain, bilateral     If walking far   No assistive devices    Myocardial infarction [facility]) 2011, 2012    x2    Primary insomnia     Last Assessed; 7/20/2016    Recurrent right inguinal hernia     Last Assessed: 2/23/2015    Urinary incontinence     Urinary urgency     Last Assessed: 10/30/2015    Wears glasses         Past Surgical History:   Procedure Laterality Date    BLADDER STONE REMOVAL      Laser procedure    CARDIAC CATHETERIZATION  2003    RCA stenting and 2004 stenting to circuflex    COLONOSCOPY      CORONARY ANGIOPLASTY WITH STENT PLACEMENT      Has 2 stents    CYSTOSCOPY  11/11/2015    w/ Cystolitholapaxy    HERNIA REPAIR Bilateral 03/05/2015    Inguinal    JOINT REPLACEMENT Bilateral     TKR    JOINT REPLACEMENT Right     Right THR    NM CYSTOURETHROSCOPY,FULGUR <0 5 CM LESN N/A 2/8/2017    Procedure: CYSTOSCOPY WITH BIOPSIES WITH FULGURATION, INSTILLATION OF MYTOMYCIN, DIALATION OF MEATUS ;  Surgeon: Jas English MD;  [facility];  Service: Urology    WISDOM TOOTH EXTRACTION          No Known Allergies     Outpatient Encounter Prescriptions as of 10/31/2018   Medication Sig Dispense Refill    acetaminophen (TYLENOL) 325 mg tablet Take 650 mg by mouth every 6 (six) hours as needed for mild pain      aspirin (ECOTRIN) 325 mg EC tablet Take 1 tablet by mouth      cholecalciferol (VITAMIN D3) 1,000 units tablet Take by mouth      finasteride (PROSCAR) 5 mg tablet TAKE 1 TABLET DAILY   90 tablet 3    meloxicam (MOBIC) 15 mg tablet Take 1 tablet (15 mg total) by mouth daily 14 tablet 0    nitroglycerin (NITROSTAT) 0 4 mg SL tablet Place 1 tablet (0 4 mg total) under the tongue every 5 (five) minutes as needed for chest pain 5 tablet 2    Omega-3 Fatty Acids (OMEGA 3 PO) Take 2 capsules by mouth daily      Red Yeast Rice Extract (RED YEAST RICE PO) Take 2 capsules by mouth daily      tamsulosin (FLOMAX) 0 4 mg take 1 capsule by mouth twice a day 180 capsule 0    valsartan-hydrochlorothiazide (DIOVAN-HCT) 320-25 MG per tablet take 1 tablet by mouth at bedtime 90 tablet 1    amLODIPine (NORVASC) 2 5 mg tablet Take 1 tablet (2 5 mg total) by mouth daily at bedtime for 90 days 90 tablet 1    fluticasone (FLONASE) 50 mcg/act nasal spray 2 sprays into each nostril daily for 30 days 1 Bottle 0    Methylprednisolone 4 MG TBPK Use as directed on package (Patient not taking: Reported on 10/31/2018 ) 21 tablet 0    Mirabegron ER 25 MG TB24 Take 25 mg by mouth daily for 30 doses 30 tablet 3     No facility-administered encounter medications on file as of 10/31/2018  Social History   Substance Use Topics    Smoking status: Former Smoker     Types: Cigarettes, Pipe    Smokeless tobacco: Never Used      Comment: Quit 50 years ago and only smoked for 1 year    Alcohol use 1 2 oz/week     2 Shots of liquor per week      Comment: 2 shots of liquor weekly        Family History   Problem Relation Age of Onset    No Known Problems Mother         Diabetes per [employer]    No Known Problems Father     Skin cancer Brother     Diabetes Brother     Bipolar disorder Other     Diabetes Family     Hypertension Family     Substance Abuse Neg Hx     Alcohol abuse Neg Hx         REVIEW OF SYSTEMS:  The patient has entered data on an intake form regarding present illness, past medical and surgical history, medications, allergies, family and social history, and a full review of 14 systems  I have reviewed this form with the patient, and all the relevant information has been included on this note  The full review of systems was negative except as stated in HPI and below  Constitutional: Negative  Negative for appetite change and fever  HENT: Negative  Negative for hearing loss, tinnitus, trouble swallowing and voice change  Eyes: Negative  Negative for photophobia and pain  Respiratory: Negative  Negative for shortness of breath  Cardiovascular: Negative  Negative for palpitations  Gastrointestinal: Negative  Negative for nausea  Endocrine: Negative  Negative for cold intolerance and heat intolerance  Genitourinary: Negative  Negative for dysuria, frequency and urgency  Musculoskeletal: Negative  Negative for myalgias and neck pain  Skin: Negative  Allergic/Immunologic: Negative  Neurological: Negative  Negative for dizziness, tremors, seizures, syncope, facial asymmetry, speech difficulty, weakness and numbness  Memory problems    Hematological: Negative  Does not bruise/bleed easily  Psychiatric/Behavioral: Positive for confusion  Negative for hallucinations  PHYSICAL EXAMINATION:     Vital signs:  /72 (BP Location: Right arm, Patient Position: Sitting, Cuff Size: Standard)   Pulse 57   Ht 5' 7" (1 702 m)   Wt 94 5 kg (208 lb 6 4 oz)   BMI 32 64 kg/m²      General:  Well-appearing, well nourished, pleasant patient in no acute distress  Mood and Fund of Knowledge are appropriate  Head:  Normocephalic, atraumatic  Oropharynx and conjunctiva are clear  Speech  No hypophonia or bradylalia  No scanning speech  Language: Comprehension intact  Neck:  Supple, strong 5/5 forward flexion and retroflexion  Extremities: Range of motion is normal       Cognitive and Mental Exam:  MOCA      Points MAX   Visuospatial  ----------   Trails A 1 1   Cube Drawing 0 1   Clock Drawing 3 3   Naming Objects 3 3   Attention  ----------   Digit Span 2 2   Letter Reading 1 1   Serial 7s 3 3   Language  ----------   Repetition 2 2   Fluency 0 1   Abstraction 2 2   Delayed Recall 0 5   Orientation                 6             6              23 30    +1 for high school education = 24     Cranial Nerves:  Funduscopic examination reveals no papilledema  Direct and consensual light reflexes were normal  No afferent pupillary defect  Visual fields are full to confrontation  Extraocular movements were full, with normal pursuit and saccades  Pupils were equal, reactive to light symmetrically  Facial sensation to light touch was intact  Face is symmetric with normal strength     Hearing was assessed using the Calibrated Finger Rub Auditory Screening Test (CALFRAST) and was not abnormal (Better than CALFRAST-Strong-70)  Palate is up going bilaterally and symmetrically  Neck muscles are strong  Tongue protrusion is at midline with normal movements  No dysarthria  Motor:    Dystonia: both hands held pronated in front of body when walking  Dyskinesia: none  Myoclonus: none  Chorea: none  Tics: none  MDS-UPDRS III:   Speech: 1  Facial Expression: 1  Tremor (Head):  0  Rest Tremor Severity (RUE/LUE/RLE/LLE/Lip): 0/0/0/0/0  Action Tremor of Hands (R): 0  Action Tremor of Hands (L): 0  Finger tapping (R): 1  Finger tapping (L): 1  Hand clenching (R): 0  Hand clenching (L): 1  JALEN Hand (R): 1  JALEN Hand (L): 1  Toe Tapping (R):   0  Toe Tapping (L):   0  Leg Agility (R):   0  Leg Agility (L):   1  Rigidity (Neck): 1  Rigidity (RUE): 1  Rigidity (LUE): 0  Rigidity (RLE): 0  Rigidity (LLE): 0  Arising From Chair: 0  Posture: 0  Gait: 0  Freezing of Gait: 0  Postural Stability: 0  Body Bradykinesia: 1  -------------------------------------------------------------------------------------  11    Muscle Strength Right Left  Muscle Strength Right Left   Deltoid 5/5 5/5  Hip Adductors 5/5 5/5   Biceps 5/5 5/5  Hip Abductors 5/5 5/5   Triceps 5/5 5/5  Knee Extensors 5/5 5/5   Wrist Extensors 5/5 5/5  Knee Flexors 5/5 5/5   Wrist Flexors 5/5 5/5  Ankle Extensors 5/5 5/5    5/5 5/5  Ankle Flexors 5/5 5/5   Finger Abductors 5/5 5/5       Hip Flexors 5/5 5/5   Hip Extensors 5/5 5/5     Sensory  Intact to Light Touch on LEs but less relatively in the hands  vibration sense decreased in both feet up to the knees  Coordination:  Finger-to-nose-finger: normal     Gait:  Normal comprehensive gait evaluation, has normal raising, stance, gait, turns, tandem gait, tip-toes, heels and Pull test normal (2 steps back)  Inconsistent R hand wrist flex-ext tremor        Reflexes:     Right Left   Biceps 2/4 2/4   Brachioradialis 2/4 2/4   Triceps 2/4 2/4   Knee 2/4 2/4 Ankle 2/4 2/4      Plantar cutaneous reflex:  Right: extensor  Left: extensor      REVIEW OF ANCILLARY TESTS:   No results found for this or any previous visit

## 2018-10-31 NOTE — PROGRESS NOTES
Review of Systems   Constitutional: Negative  Negative for appetite change and fever  HENT: Negative  Negative for hearing loss, tinnitus, trouble swallowing and voice change  Eyes: Negative  Negative for photophobia and pain  Respiratory: Negative  Negative for shortness of breath  Cardiovascular: Negative  Negative for palpitations  Gastrointestinal: Negative  Negative for nausea  Endocrine: Negative  Negative for cold intolerance and heat intolerance  Genitourinary: Negative  Negative for dysuria, frequency and urgency  Musculoskeletal: Negative  Negative for myalgias and neck pain  Skin: Negative  Allergic/Immunologic: Negative  Neurological: Negative  Negative for dizziness, tremors, seizures, syncope, facial asymmetry, speech difficulty, weakness and numbness  Memory problems     Hematological: Negative  Does not bruise/bleed easily  Psychiatric/Behavioral: Positive for confusion  Negative for hallucinations

## 2018-11-05 DIAGNOSIS — G31.84 MILD COGNITIVE IMPAIRMENT WITH MEMORY LOSS: Primary | ICD-10-CM

## 2018-11-11 ENCOUNTER — HOSPITAL ENCOUNTER (OUTPATIENT)
Dept: MRI IMAGING | Facility: HOSPITAL | Age: 78
Discharge: HOME | End: 2018-11-11
Payer: COMMERCIAL

## 2018-11-11 DIAGNOSIS — G31.84 MILD COGNITIVE IMPAIRMENT WITH MEMORY LOSS: ICD-10-CM

## 2018-11-11 PROCEDURE — 70551 MRI BRAIN STEM W/O DYE: CPT

## 2018-11-16 ENCOUNTER — TELEPHONE (OUTPATIENT)
Dept: NEUROLOGY | Facility: CLINIC | Age: 78
End: 2018-11-16

## 2018-11-16 NOTE — TELEPHONE ENCOUNTER
LMOM for patient to return call     ----- Message from Ant Lane MD sent at 11/16/2018 11:38 AM EST -----  Regarding: MRI results  Please tell pt I've reviewed his MRI and there are a lot of age-related changes, but nothing disproportionate  No obvious structural issues  Continue with diabetes control, taking the aspirin and the blood testing as previous plan goes   Call with any questions, thanks    ----- Message -----  From: SUSAN Castillo  Sent: 11/12/2018   5:52 PM  To: Ant Lane MD    For your review    ----- Message -----  From: Interface, Radiology Results In  Sent: 11/12/2018   2:12 PM  To: Deepika Morris

## 2018-11-19 ENCOUNTER — TELEPHONE (OUTPATIENT)
Dept: NEUROLOGY | Facility: CLINIC | Age: 78
End: 2018-11-19

## 2018-11-19 DIAGNOSIS — J30.1 ALLERGIC RHINITIS DUE TO POLLEN, UNSPECIFIED SEASONALITY: ICD-10-CM

## 2018-11-19 RX ORDER — FLUTICASONE PROPIONATE 50 MCG
2 SPRAY, SUSPENSION (ML) NASAL DAILY
Qty: 1 BOTTLE | Refills: 1 | Status: SHIPPED | OUTPATIENT
Start: 2018-11-19 | End: 2022-07-28

## 2018-12-03 ENCOUNTER — PATIENT OUTREACH (OUTPATIENT)
Dept: FAMILY MEDICINE CLINIC | Facility: CLINIC | Age: 78
End: 2018-12-03

## 2018-12-07 ENCOUNTER — PATIENT OUTREACH (OUTPATIENT)
Dept: FAMILY MEDICINE CLINIC | Facility: CLINIC | Age: 78
End: 2018-12-07

## 2018-12-07 NOTE — PROGRESS NOTES
Reached out to patient to introduce myself and the outpatient care management program  He was very pleasant  He reports that he is doing well and that his medications are all working just fine  He did request clarification regarding his yearly wellness visit date as [employer] called him to remind him  He already has an appointment scheduled with Dr Deandre Santana for 4/29/19  He has it written down and will confirm with [employer]  At this time he has no further needs  He is following up with all providers appropriately  I told him to not hesitate to reach out to me in the future if he thinks I can be of assistance  He agreed he would  Will close for now

## 2018-12-27 DIAGNOSIS — I10 BENIGN ESSENTIAL HYPERTENSION: ICD-10-CM

## 2018-12-27 RX ORDER — AMLODIPINE BESYLATE 2.5 MG/1
TABLET ORAL
Qty: 90 TABLET | Refills: 1 | Status: SHIPPED | OUTPATIENT
Start: 2018-12-27 | End: 2019-06-20 | Stop reason: SDUPTHER

## 2019-01-31 ENCOUNTER — TELEPHONE (OUTPATIENT)
Dept: NEUROLOGY | Facility: CLINIC | Age: 79
End: 2019-01-31

## 2019-01-31 DIAGNOSIS — N40.1 BENIGN PROSTATIC HYPERPLASIA WITH LOWER URINARY TRACT SYMPTOMS, SYMPTOM DETAILS UNSPECIFIED: ICD-10-CM

## 2019-01-31 RX ORDER — TAMSULOSIN HYDROCHLORIDE 0.4 MG/1
CAPSULE ORAL
Qty: 180 CAPSULE | Refills: 0 | Status: SHIPPED | OUTPATIENT
Start: 2019-01-31 | End: 2019-05-13 | Stop reason: SDUPTHER

## 2019-02-01 ENCOUNTER — TELEPHONE (OUTPATIENT)
Dept: NEUROLOGY | Facility: CLINIC | Age: 79
End: 2019-02-01

## 2019-02-08 ENCOUNTER — APPOINTMENT (OUTPATIENT)
Dept: LAB | Facility: MEDICAL CENTER | Age: 79
End: 2019-02-08
Payer: COMMERCIAL

## 2019-02-08 DIAGNOSIS — G31.84 MILD COGNITIVE IMPAIRMENT WITH MEMORY LOSS: ICD-10-CM

## 2019-02-08 LAB
25(OH)D3 SERPL-MCNC: 27.2 NG/ML (ref 30–100)
MAGNESIUM SERPL-MCNC: 2.1 MG/DL (ref 1.6–2.6)
VIT B12 SERPL-MCNC: 643 PG/ML (ref 100–900)

## 2019-02-08 PROCEDURE — 82525 ASSAY OF COPPER: CPT

## 2019-02-08 PROCEDURE — 82607 VITAMIN B-12: CPT

## 2019-02-08 PROCEDURE — 82306 VITAMIN D 25 HYDROXY: CPT

## 2019-02-08 PROCEDURE — 82390 ASSAY OF CERULOPLASMIN: CPT

## 2019-02-08 PROCEDURE — 86592 SYPHILIS TEST NON-TREP QUAL: CPT

## 2019-02-08 PROCEDURE — 36415 COLL VENOUS BLD VENIPUNCTURE: CPT

## 2019-02-08 PROCEDURE — 83735 ASSAY OF MAGNESIUM: CPT

## 2019-02-09 LAB — CERULOPLASMIN SERPL-MCNC: 18 MG/DL (ref 16–31)

## 2019-02-11 ENCOUNTER — TELEPHONE (OUTPATIENT)
Dept: NEUROLOGY | Facility: CLINIC | Age: 79
End: 2019-02-11

## 2019-02-11 NOTE — TELEPHONE ENCOUNTER
----- Message from Victory Merlin, MD sent at 2/11/2019  2:35 PM EST -----  Regarding: Lab Results  Results of the testing I ordered are reassuringly normal EXCEPT for mildly insufficient vitamin D  This was slightly worse compared to previously  Recommend taking 2000 IU of his current supplementation pills daily       Rest of plan the same as regimented physical activity for general health, making schedule for daily activities for better organization and handling of tasks, writing down tasks on paper and reminder alerts on phone devices      ----- Message -----  From: Alia Kraus, Meño Incoming  Sent: 2/11/2019   1:04 PM  To: Victory Merlin, MD

## 2019-02-12 LAB
COPPER SERPL-MCNC: 80 UG/DL (ref 72–166)
MISCELLANEOUS LAB TEST RESULT: NORMAL

## 2019-02-25 ENCOUNTER — TELEPHONE (OUTPATIENT)
Dept: NEUROLOGY | Facility: CLINIC | Age: 79
End: 2019-02-25

## 2019-02-25 NOTE — TELEPHONE ENCOUNTER
LMOM for patient to see if he wanted to come in for 1030 appt instead of the afternoon  When pt calls back please transfer to me   Thank you

## 2019-02-27 NOTE — PROGRESS NOTES
DEPARTMENT OF NEUROLOGICAL SCIENCES  [address] DISORDERS CLINIC        RETURN PATIENT NOTE    Patient: Chad Fuentes Record Number: # [id number]  YOB: 1940  Date of visit: 2/28/2019    Referring provider: No ref  provider found    Diagnoses for this encounter:  1  Mild cognitive impairment with memory loss     2  Concentration deficit     3  Vitamin D deficiency       ASSESSMENT     Impression of this 71 yo M with ~1 5 year hx of increased forgetfulness and concentration deficits, scoring MOCA of 24/30 in Oct 2018 with mainly problems in word recall (0/5 words)  He remains very functional at home, without safety concerns from his wife and he is capable of driving and doing most tasks with minor prompting and reminders  He has not yet been trying puzzles or mental teasers, and his physical activity is decreased this Winter due to weather, but expected to normalize  Nonfocal physical examination with no strong signs of parkinsonism compared to last visit except mild hypomimia and questionable decreased R arm swing  At this point continue as below plan  PLAN     ? Reviewed MRI brain wo contrast; no concerning signs  ? Will not start on any new medication at this time as symptoms are mainly concentration and with some reorientation he is able to function well otherwise  ? Encouraged to remain both physically and mentally active, including crossword puzzles, brain teasers to improve both focus and memory  Break down tasks into digestible chunks at a time  Remove distractions when completing a goal  Online resources at [employer] for cognitive training games for free  ? Will refer to Neuropsychology if his deficits should progress  As of now he is mainly distracted but no clear decline and remains very functional     ? The patient has been instructed to call us about any new neurological problems or medication side effects  ?  Return to Clinic on 4 months    A total of 35 minutes were spent face-to-face with this patient, of which at least 50% was spent on counseling and coordination of care  We discussed the natural history of the patient's condition, differential diagnosis, level of diagnostic certainty, treatment alternatives and their side effects and possible complications  HISTORY OF PRESENT ILLNESS:     Mr Gee Haines is a 70 y o  right handed male who returns to the [facility] [address] [facility] for evaluation of memory problems  Last visit 10/31/18  The patient was accompanied today  History was obtained from patient and wife  Interim History  MRI and there are a lot of age-related changes, but nothing disproportionate  Labs normal except for mild Vit D deficiency with replacement of double his current Vit D dose suggested  Wife tells me that she they are going somewhere he would be going in the wrong direction, however afterwards he can find his way after the reminder  Wife has no concerns about his safety driving, only with the initial confusion on destination  He has completely sold the business now and is no longer needing to handle the aspects of it       INITIAL HISTORY  He feels he first noticed issues between 9-12 months ago in late 2017 - early 2018 and noticing forgetfulness  He states he had had difficulty recalling directions to several previously familiar locales  He has missed paying several of his bills, forgetting to timely perform yardwork maintenance  At first he thought it was related to age, but he is now concerned since now occurring more often and to a wider variety of situations  At times he says he may ask the same question twice in the same conversation  Denies issues with processing language, speaking, losing personal items more often than before (he admits being not very organized at baseline)  Denies any safety-related concerns  He is able to be reminded of things he forgets easily   Denies issues recognizing people or places he was familiar with  No long-term memory concerns  He admits not being as active physically as before  Has no concerns about his sleeping  Denies any personality changes (being irritable, more apathetic, etc)  Denies anxiety or depression  He says he feels very unaccustomed to being retired and having none of his formerly scheduled life       Living Condition and Site: 2nd marriage  Living at own home with wife  Owned a landscaping business formerly, sold the business a few years ago  ADLs: can dress, feed, toilet, and bathe himself  IADLs: he cooks, shops  Can drive without safety concerns, but wife pays the household bills but he had been handling the business side of things       Hyposmia: mild  RBD (REM semi-purposeful body movements): denies  Gait Disturbance:  none  Dementia:  See above  MOCA: 24/30 in 10/31/18  Hallucination: denies  Exercise Therapy:  Spends most days watching TV or socializing with friends     Family History: Number of First Degree Relatives With Parkinsonism: none, and none for Dementia     Imaging: none      REVIEW OF PAST MEDICAL, SOCIAL AND FAMILY HISTORY:  This is the list of problems as per our Medical Records:    Patient Active Problem List    Diagnosis Date Noted    Chronic nasal congestion 10/26/2018    Hypertension 07/11/2018    Olecranon bursitis of left elbow 05/18/2018    Memory problem 04/26/2018    Allergic rhinitis 03/08/2018    Primary insomnia 08/30/2017    Seborrheic keratoses, inflamed 07/19/2017    Symptomatic bradycardia 07/20/2016    Osteoarthritis of right hip 05/02/2016    Bladder calculus 10/30/2015    Benign essential hypertension 03/20/2015    Arteriosclerosis of coronary artery 04/29/2014    DM2 (diabetes mellitus, type 2) ([facility] [address] ) 04/29/2014    Vitamin D deficiency 04/17/2014    Benign prostatic hyperplasia 06/04/2012    Hyperlipidemia 06/04/2012       Past Medical History:   Diagnosis Date    Abnormal blood chemistry     Last Assessed: 7/19/2013    Abnormal glucose     Last Assessed: 11/6/2015    Arthritis     Bladder tumor     BPH (benign prostatic hypertrophy)     Cataracts, bilateral     "start of"    Coronary artery disease     2 stents  MI x2    Diabetes mellitus ([address] )     Diet controlled pre-diabetic    Erectile dysfunction of non-organic origin     Last Assessed: 5/9/2014    Fatigue     Resolved: 2/23/2015    Glaucoma     "Start of"    Hematuria, microscopic     Last Assessed: 11/7/2016    History of bladder stone     Hyperlipidemia     Hypertension     Knee pain, bilateral     If walking far   No assistive devices    Myocardial infarction [facility]) 2011, 2012    x2    Primary insomnia     Last Assessed; 7/20/2016    Recurrent right inguinal hernia     Last Assessed: 2/23/2015    Urinary incontinence     Urinary urgency     Last Assessed: 10/30/2015    Wears glasses         Past Surgical History:   Procedure Laterality Date    BLADDER STONE REMOVAL      Laser procedure    CARDIAC CATHETERIZATION  2003    RCA stenting and 2004 stenting to circuflex    COLONOSCOPY      CORONARY ANGIOPLASTY WITH STENT PLACEMENT      Has 2 stents    CYSTOSCOPY  11/11/2015    w/ Cystolitholapaxy    HERNIA REPAIR Bilateral 03/05/2015    Inguinal    JOINT REPLACEMENT Bilateral     TKR    JOINT REPLACEMENT Right     Right THR    KS CYSTOURETHROSCOPY,FULGUR <0 5 CM MARIO N/A 2/8/2017    Procedure: CYSTOSCOPY WITH BIOPSIES WITH FULGURATION, INSTILLATION OF MYTOMYCIN, DIALATION OF MEATUS ;  Surgeon: Lakshmi Schultz MD;  Location: [facility] OR;  Service: Urology    WISDOM TOOTH EXTRACTION          No Known Allergies     Outpatient Encounter Medications as of 2/28/2019   Medication Sig Dispense Refill    acetaminophen (TYLENOL) 325 mg tablet Take 650 mg by mouth every 6 (six) hours as needed for mild pain      amLODIPine (NORVASC) 2 5 mg tablet take 1 tablet by mouth at bedtime 90 tablet 1    aspirin (ECOTRIN) 325 mg EC tablet Take 1 tablet by mouth      cholecalciferol (VITAMIN D3) 1,000 units tablet Take by mouth      finasteride (PROSCAR) 5 mg tablet TAKE 1 TABLET DAILY  90 tablet 3    meloxicam (MOBIC) 15 mg tablet Take 1 tablet (15 mg total) by mouth daily 14 tablet 0    nitroglycerin (NITROSTAT) 0 4 mg SL tablet Place 1 tablet (0 4 mg total) under the tongue every 5 (five) minutes as needed for chest pain 5 tablet 2    Omega-3 Fatty Acids (OMEGA 3 PO) Take 2 capsules by mouth daily      Red Yeast Rice Extract (RED YEAST RICE PO) Take 2 capsules by mouth daily      tamsulosin (FLOMAX) 0 4 mg TAKE 1 CAPSULE BY MOUTH TWICE A  capsule 0    valsartan-hydrochlorothiazide (DIOVAN-HCT) 320-25 MG per tablet take 1 tablet by mouth at bedtime 90 tablet 1    fluticasone (FLONASE) 50 mcg/act nasal spray 2 sprays into each nostril daily for 30 days 1 Bottle 1    Mirabegron ER 25 MG TB24 Take 25 mg by mouth daily for 30 doses 30 tablet 3    [DISCONTINUED] Methylprednisolone 4 MG TBPK Use as directed on package (Patient not taking: Reported on 10/31/2018 ) 21 tablet 0     No facility-administered encounter medications on file as of 2/28/2019  Social History     Tobacco Use    Smoking status: Former Smoker     Types: Cigarettes, Pipe    Smokeless tobacco: Never Used    Tobacco comment: Quit 50 years ago and only smoked for 1 year   Substance Use Topics    Alcohol use:  Yes     Alcohol/week: 1 2 oz     Types: 2 Shots of liquor per week     Comment: 2 shots of liquor weekly      Family History   Problem Relation Age of Onset    No Known Problems Mother         Diabetes per [employer]    No Known Problems Father     Skin cancer Brother     Diabetes Brother     Bipolar disorder Other     Diabetes Family     Hypertension Family     Substance Abuse Neg Hx     Alcohol abuse Neg Hx         REVIEW OF SYSTEMS:  The patient has entered data on an intake form regarding present illness, past medical and surgical history, medications, allergies, family and social history, and a full review of 14 systems  I have reviewed this form with the patient, and all the relevant information has been included on this note  The full review of systems was negative except as stated in HPI and below  Constitutional: Negative  Negative for appetite change and fever  HENT: Negative  Negative for hearing loss, tinnitus, trouble swallowing and voice change  Eyes: Negative  Negative for photophobia and pain  Respiratory: Negative  Negative for shortness of breath  Cardiovascular: Negative  Negative for palpitations  Gastrointestinal: Negative  Negative for nausea and vomiting  Endocrine: Negative  Negative for cold intolerance and heat intolerance  Genitourinary: Positive for frequency and urgency  Negative for dysuria  Musculoskeletal: Positive for gait problem  Negative for myalgias and neck pain  Skin: Negative  Negative for rash  Neurological: Negative for dizziness, tremors, seizures, syncope, facial asymmetry, speech difficulty, weakness, light-headedness, numbness and headaches  Snoring  Clumsiness   Hematological: Negative  Does not bruise/bleed easily  Psychiatric/Behavioral: Positive for confusion and sleep disturbance  Negative for hallucinations  FOCUSED PHYSICAL EXAMINATION:     Vital signs:  /64 (BP Location: Right arm, Patient Position: Sitting, Cuff Size: Standard)   Pulse 89   Resp 14   Ht 5' 7" (1 702 m)   Wt 91 6 kg (202 lb)   BMI 31 64 kg/m²     General:  Well-appearing, well nourished, pleasant patient in no acute distress  Mood and Fund of Knowledge are appropriate  Head:  Normocephalic, atraumatic  Oropharynx and conjunctiva are clear  Speech  No hypophonia or bradylalia  No scanning speech  Language: Comprehension intact  Neck:  Supple, strong 5/5 forward flexion and retroflexion     Extremities: Range of motion is normal       Cognitive and Mental Exam:  MOCA in Oct 2018 was 24/30 with 0/5 item recall     When asked to recall five words (Turtle, table, winter, [place], amilcar), he could get 3/5 ("table, [place], turtle")  He correctly listed the coins making up $0 87  He listed Eugenio as president, but then skipped Obbina, and went to [employer] and then [facility]  He needed prompting  He was oriented to person, place, time and situation  Able to do Serial 3's well  Cranial Nerves:  Direct and consensual light reflexes were normal  No afferent pupillary defect  Visual fields are full to confrontation  Extraocular movements were full, with normal pursuit and saccades  Pupils were equal, reactive to light symmetrically  Facial sensation to light touch was intact  Face is symmetric with normal strength  Hearing was assessed using the Calibrated Finger Rub Auditory Screening Test (CALFRAST) and was not abnormal (Better than CALFRAST-Strong-70)  Palate is up going bilaterally and symmetrically  Neck muscles are strong  Tongue protrusion is at midline with normal movements  No dysarthria  Motor:    Dystonia: none today  Dyskinesia: none  Myoclonus: none  Chorea: none  Tics: none  MDS-UPDRS III:   Was 6 in Oct 2018     Speech: 1  Facial Expression: 3 mouth open  Tremor (Head):  0  Rest Tremor Severity (RUE/LUE/RLE/LLE/Lip): 0/0/0/0/0  Action Tremor of Hands (R): 0  Action Tremor of Hands (L): 0  Finger tapping (R): 0  Finger tapping (L): 0  Hand clenching (R): 0  Hand clenching (L): 0  JALEN Hand (R): 1  JALEN Hand (L): 1  Toe Tapping (R):  0  Toe Tapping (L):  0  Leg Agility (R):  0  Leg Agility (L):  1  Rigidity (Neck): 0  Rigidity (RUE): 0  Rigidity (LUE): 0  Rigidity (RLE): 0  Rigidity (LLE): 0  Arising From Chair: 0  Posture: 0  Gait: 0  Freezing of Gait: 0  Postural Stability: 0  Body Bradykinesia: 2 reduced arm swing on the R, no resting tremor  -------------------------------------------------------------------------------------      Muscle Strength Right Left  Muscle Strength Right Left   Deltoid 5/5 5/5  Hip Adductors 5/5 5/5   Biceps 5/5 5/5  Hip Abductors 5/5 5/5   Triceps 5/5 5/5  Knee Extensors 5/5 5/5   Wrist Extensors 5/5 5/5  Knee Flexors 5/5 5/5   Wrist Flexors 5/5 5/5  Ankle Extensors 5/5 5/5    5/5 5/5  Ankle Flexors 5/5 5/5   Finger Abductors 5/5 5/5       Hip Flexors 5/5 5/5   Hip Extensors 5/5 5/5      Reflexes:     Right Left   Biceps 2/4 2/4   Brachioradialis 2/4 2/4   Triceps 2/4 2/4   Knee 2/4 2/4   Ankle 2/4 2/4        REVIEW OF ANCILLARY TESTS:   No results found for this or any previous visit

## 2019-02-28 ENCOUNTER — OFFICE VISIT (OUTPATIENT)
Dept: NEUROLOGY | Facility: CLINIC | Age: 79
End: 2019-02-28
Payer: COMMERCIAL

## 2019-02-28 VITALS
HEIGHT: 67 IN | DIASTOLIC BLOOD PRESSURE: 64 MMHG | WEIGHT: 202 LBS | SYSTOLIC BLOOD PRESSURE: 122 MMHG | BODY MASS INDEX: 31.71 KG/M2 | HEART RATE: 89 BPM | RESPIRATION RATE: 14 BRPM

## 2019-02-28 DIAGNOSIS — E55.9 VITAMIN D DEFICIENCY: ICD-10-CM

## 2019-02-28 DIAGNOSIS — R41.840 CONCENTRATION DEFICIT: ICD-10-CM

## 2019-02-28 DIAGNOSIS — G31.84 MILD COGNITIVE IMPAIRMENT WITH MEMORY LOSS: Primary | ICD-10-CM

## 2019-02-28 PROCEDURE — 99214 OFFICE O/P EST MOD 30 MIN: CPT | Performed by: PSYCHIATRY & NEUROLOGY

## 2019-02-28 NOTE — PROGRESS NOTES
Review of Systems   Constitutional: Negative  Negative for appetite change and fever  HENT: Negative  Negative for hearing loss, tinnitus, trouble swallowing and voice change  Eyes: Negative  Negative for photophobia and pain  Respiratory: Negative  Negative for shortness of breath  Cardiovascular: Negative  Negative for palpitations  Gastrointestinal: Negative  Negative for nausea and vomiting  Endocrine: Negative  Negative for cold intolerance and heat intolerance  Genitourinary: Positive for frequency and urgency  Negative for dysuria  Musculoskeletal: Positive for gait problem  Negative for myalgias and neck pain  Skin: Negative  Negative for rash  Neurological: Negative for dizziness, tremors, seizures, syncope, facial asymmetry, speech difficulty, weakness, light-headedness, numbness and headaches  Snoring  Clumsiness     Hematological: Negative  Does not bruise/bleed easily  Psychiatric/Behavioral: Positive for confusion and sleep disturbance  Negative for hallucinations

## 2019-02-28 NOTE — LETTER
February 28, 2019     Jared Gama DO  [facility]  [address]    Patient: Renetta Atwood   YOB: 1940   Date of Visit: 2/28/2019       Dear Dr Pau Cortez:    Thank you for referring Jono Jarrett to me for evaluation  Below are my notes for this consultation  If you have questions, please do not hesitate to call me  I look forward to following your patient along with you  Sincerely,        Cass Willard MD        CC: No Recipients  Cass Willard MD  2/28/2019 11:44 AM  Sign at close encounter  [facility] PATIENT NOTE    Patient: Chad Fuentes Record Number: # [id number]  YOB: 1940  Date of visit: 2/28/2019    Referring provider: No ref  provider found    Diagnoses for this encounter:  1  Mild cognitive impairment with memory loss     2  Concentration deficit     3  Vitamin D deficiency       ASSESSMENT     Impression of this 71 yo M with ~1 5 year hx of increased forgetfulness and concentration deficits, scoring MOCA of 24/30 in Oct 2018 with mainly problems in word recall (0/5 words)  He remains very functional at home, without safety concerns from his wife and he is capable of driving and doing most tasks with minor prompting and reminders  He has not yet been trying puzzles or mental teasers, and his physical activity is decreased this Winter due to weather, but expected to normalize  Nonfocal physical examination with no strong signs of parkinsonism compared to last visit except mild hypomimia and questionable decreased R arm swing  At this point continue as below plan  PLAN     ? Reviewed MRI brain wo contrast; no concerning signs  ? Will not start on any new medication at this time as symptoms are mainly concentration and with some reorientation he is able to function well otherwise  ?  Encouraged to remain both physically and mentally active, including crossword puzzles, brain teasers to improve both focus and memory  Break down tasks into digestible chunks at a time  Remove distractions when completing a goal  Online resources at [employer] for cognitive training games for free  ? Will refer to Neuropsychology if his deficits should progress  As of now he is mainly distracted but no clear decline and remains very functional     ? The patient has been instructed to call us about any new neurological problems or medication side effects  ? Return to Clinic on 4 months    A total of 40 minutes were spent face-to-face with this patient, of which at least 50% was spent on counseling and coordination of care  We discussed the natural history of the patient's condition, differential diagnosis, level of diagnostic certainty, treatment alternatives and their side effects and possible complications  HISTORY OF PRESENT ILLNESS:     Mr Bairon Mello is a 70 y o  right handed male who returns to the [address] [place] [address] for evaluation of memory problems  Last visit 10/31/18  The patient was accompanied today  History was obtained from patient and wife  Interim History  MRI and there are a lot of age-related changes, but nothing disproportionate  Labs normal except for mild Vit D deficiency with replacement of double his current Vit D dose suggested  Wife tells me that she they are going somewhere he would be going in the wrong direction, however afterwards he can find his way after the reminder  Wife has no concerns about his safety driving, only with the initial confusion on destination  He has completely sold the business now and is no longer needing to handle the aspects of it       INITIAL HISTORY  He feels he first noticed issues between 9-12 months ago in late 2017 - early 2018 and noticing forgetfulness  He states he had had difficulty recalling directions to several previously familiar locales   He has missed paying several of his bills, forgetting to timely perform yardwork maintenance  At first he thought it was related to age, but he is now concerned since now occurring more often and to a wider variety of situations  At times he says he may ask the same question twice in the same conversation  Denies issues with processing language, speaking, losing personal items more often than before (he admits being not very organized at baseline)  Denies any safety-related concerns  He is able to be reminded of things he forgets easily  Denies issues recognizing people or places he was familiar with  No long-term memory concerns  He admits not being as active physically as before  Has no concerns about his sleeping  Denies any personality changes (being irritable, more apathetic, etc)  Denies anxiety or depression  He says he feels very unaccustomed to being retired and having none of his formerly scheduled life       Living Condition and Site: 2nd marriage  Living at own home with wife  Owned a landscaping business formerly, sold the business a few years ago  ADLs: can dress, feed, toilet, and bathe himself  IADLs: he cooks, shops  Can drive without safety concerns, but wife pays the household bills but he had been handling the business side of things       Hyposmia: mild  RBD (REM semi-purposeful body movements): denies  Gait Disturbance:  none  Dementia:  See above  MOCA: 24/30 in 10/31/18  Hallucination: denies  Exercise Therapy:  Spends most days watching TV or socializing with friends     Family History: Number of First Degree Relatives With Parkinsonism: none, and none for Dementia     Imaging: none      REVIEW OF PAST MEDICAL, SOCIAL AND FAMILY HISTORY:  This is the list of problems as per our Medical Records:    Patient Active Problem List    Diagnosis Date Noted    Chronic nasal congestion 10/26/2018    Hypertension 07/11/2018    Olecranon bursitis of left elbow 05/18/2018    Memory problem 04/26/2018    Allergic rhinitis 03/08/2018    Primary insomnia 08/30/2017    Seborrheic keratoses, inflamed 07/19/2017    Symptomatic bradycardia 07/20/2016    Osteoarthritis of right hip 05/02/2016    Bladder calculus 10/30/2015    Benign essential hypertension 03/20/2015    Arteriosclerosis of coronary artery 04/29/2014    DM2 [facility]diabetes mellitus, type 2) ([facility] ) 04/29/2014    Vitamin D deficiency 04/17/2014    Benign prostatic hyperplasia 06/04/2012    Hyperlipidemia 06/04/2012       Past Medical History:   Diagnosis Date    Abnormal blood chemistry     Last Assessed: 7/19/2013    Abnormal glucose     Last Assessed: 11/6/2015    Arthritis     Bladder tumor     BPH (benign prostatic hypertrophy)     Cataracts, bilateral     "start of"    Coronary artery disease     2 stents  MI x2    Diabetes mellitus ([facility] )     Diet controlled pre-diabetic    Erectile dysfunction of non-organic origin     Last Assessed: 5/9/2014    Fatigue     Resolved: 2/23/2015    Glaucoma     "Start of"    Hematuria, microscopic     Last Assessed: 11/7/2016    History of bladder stone     Hyperlipidemia     Hypertension     Knee pain, bilateral     If walking far   No assistive devices    Myocardial infarction [facility]) 2011, 2012    x2    Primary insomnia     Last Assessed; 7/20/2016    Recurrent right inguinal hernia     Last Assessed: 2/23/2015    Urinary incontinence     Urinary urgency     Last Assessed: 10/30/2015    Wears glasses         Past Surgical History:   Procedure Laterality Date    BLADDER STONE REMOVAL      Laser procedure    CARDIAC CATHETERIZATION  2003    RCA stenting and 2004 stenting to circuflex    COLONOSCOPY      CORONARY ANGIOPLASTY WITH STENT PLACEMENT      Has 2 stents    CYSTOSCOPY  11/11/2015    w/ Cystolitholapaxy    HERNIA REPAIR Bilateral 03/05/2015    Inguinal    JOINT REPLACEMENT Bilateral     TKR    JOINT REPLACEMENT Right     Right THR    [place] CYSTOURETHROSCOPY,FULGUR <0 5 CM MARIO N/A 2/8/2017    Procedure: CYSTOSCOPY WITH BIOPSIES WITH FULGURATION, INSTILLATION OF MYTOMYCIN, DIALATION OF MEATUS ;  Surgeon: Verdell Gilford, MD;  Location: [facility];  Service: Urology    WISDOM TOOTH EXTRACTION          No Known Allergies     Outpatient Encounter Medications as of 2/28/2019   Medication Sig Dispense Refill    acetaminophen (TYLENOL) 325 mg tablet Take 650 mg by mouth every 6 (six) hours as needed for mild pain      amLODIPine (NORVASC) 2 5 mg tablet take 1 tablet by mouth at bedtime 90 tablet 1    aspirin (ECOTRIN) 325 mg EC tablet Take 1 tablet by mouth      cholecalciferol (VITAMIN D3) 1,000 units tablet Take by mouth      finasteride (PROSCAR) 5 mg tablet TAKE 1 TABLET DAILY  90 tablet 3    meloxicam (MOBIC) 15 mg tablet Take 1 tablet (15 mg total) by mouth daily 14 tablet 0    nitroglycerin (NITROSTAT) 0 4 mg SL tablet Place 1 tablet (0 4 mg total) under the tongue every 5 (five) minutes as needed for chest pain 5 tablet 2    Omega-3 Fatty Acids (OMEGA 3 PO) Take 2 capsules by mouth daily      Red Yeast Rice Extract (RED YEAST RICE PO) Take 2 capsules by mouth daily      tamsulosin (FLOMAX) 0 4 mg TAKE 1 CAPSULE BY MOUTH TWICE A  capsule 0    valsartan-hydrochlorothiazide (DIOVAN-HCT) 320-25 MG per tablet take 1 tablet by mouth at bedtime 90 tablet 1    fluticasone (FLONASE) 50 mcg/act nasal spray 2 sprays into each nostril daily for 30 days 1 Bottle 1    Mirabegron ER 25 MG TB24 Take 25 mg by mouth daily for 30 doses 30 tablet 3    [DISCONTINUED] Methylprednisolone 4 MG TBPK Use as directed on package (Patient not taking: Reported on 10/31/2018 ) 21 tablet 0     No facility-administered encounter medications on file as of 2/28/2019  Social History     Tobacco Use    Smoking status: Former Smoker     Types: Cigarettes, Pipe    Smokeless tobacco: Never Used    Tobacco comment: Quit 50 years ago and only smoked for 1 year   Substance Use Topics    Alcohol use:  Yes     Alcohol/week: 1 2 oz     Types: 2 Shots of liquor per week     Comment: 2 shots of liquor weekly      Family History   Problem Relation Age of Onset    No Known Problems Mother         Diabetes per [employer]    No Known Problems Father     Skin cancer Brother     Diabetes Brother     Bipolar disorder Other     Diabetes Family     Hypertension Family     Substance Abuse Neg Hx     Alcohol abuse Neg Hx         REVIEW OF SYSTEMS:  The patient has entered data on an intake form regarding present illness, past medical and surgical history, medications, allergies, family and social history, and a full review of 14 systems  I have reviewed this form with the patient, and all the relevant information has been included on this note  The full review of systems was negative except as stated in HPI and below  Constitutional: Negative  Negative for appetite change and fever  HENT: Negative  Negative for hearing loss, tinnitus, trouble swallowing and voice change  Eyes: Negative  Negative for photophobia and pain  Respiratory: Negative  Negative for shortness of breath  Cardiovascular: Negative  Negative for palpitations  Gastrointestinal: Negative  Negative for nausea and vomiting  Endocrine: Negative  Negative for cold intolerance and heat intolerance  Genitourinary: Positive for frequency and urgency  Negative for dysuria  Musculoskeletal: Positive for gait problem  Negative for myalgias and neck pain  Skin: Negative  Negative for rash  Neurological: Negative for dizziness, tremors, seizures, syncope, facial asymmetry, speech difficulty, weakness, light-headedness, numbness and headaches  Snoring  Clumsiness   Hematological: Negative  Does not bruise/bleed easily  Psychiatric/Behavioral: Positive for confusion and sleep disturbance  Negative for hallucinations       FOCUSED PHYSICAL EXAMINATION:     Vital signs:  /64 (BP Location: Right arm, Patient Position: Sitting, Cuff Size: Standard)   Pulse 89   Resp 14   Ht 5' 7" (1 702 m)   Wt 91 6 kg (202 lb)   BMI 31 64 kg/m²      General:  Well-appearing, well nourished, pleasant patient in no acute distress  Mood and Fund of Knowledge are appropriate  Head:  Normocephalic, atraumatic  Oropharynx and conjunctiva are clear  Speech  No hypophonia or bradylalia  No scanning speech  Language: Comprehension intact  Neck:  Supple, strong 5/5 forward flexion and retroflexion  Extremities: Range of motion is normal       Cognitive and Mental Exam:  MOCA in Oct 2018 was 24/30 with 0/5 item recall     When asked to recall five words (Turtle, table, winter, [place], amilcar), he could get 3/5 ("table, [place], turtle")  He correctly listed the coins making up $0 87  He listed Trmary as president, but then skipped Obama, and went to Spreaker and then Quintero Ladan  He needed prompting  He was oriented to person, place, time and situation  Able to do Serial 3's well  Cranial Nerves:  Direct and consensual light reflexes were normal  No afferent pupillary defect  Visual fields are full to confrontation  Extraocular movements were full, with normal pursuit and saccades  Pupils were equal, reactive to light symmetrically  Facial sensation to light touch was intact  Face is symmetric with normal strength  Hearing was assessed using the Calibrated Finger Rub Auditory Screening Test (CALFRAST) and was not abnormal (Better than CALFRAST-Strong-70)  Palate is up going bilaterally and symmetrically  Neck muscles are strong  Tongue protrusion is at midline with normal movements  No dysarthria  Motor:    Dystonia: none today  Dyskinesia: none  Myoclonus: none  Chorea: none  Tics: none  MDS-UPDRS III:   Was 6 in Oct 2018     Speech: 1  Facial Expression: 3 mouth open  Tremor (Head):  0  Rest Tremor Severity (RUE/LUE/RLE/LLE/Lip): 0/0/0/0/0  Action Tremor of Hands (R): 0  Action Tremor of Hands (L): 0  Finger tapping (R): 0  Finger tapping (L): 0  Hand clenching (R): 0  Hand clenching (L): 0  JALEN Hand (R): 1  JALEN Hand (L): 1  Toe Tapping (R):  0  Toe Tapping (L):  0  Leg Agility (R):  0  Leg Agility (L):  1  Rigidity (Neck): 0  Rigidity (RUE): 0  Rigidity (LUE): 0  Rigidity (RLE): 0  Rigidity (LLE): 0  Arising From Chair: 0  Posture: 0  Gait: 0  Freezing of Gait: 0  Postural Stability: 0  Body Bradykinesia: 2 reduced arm swing on the R, no resting tremor  -------------------------------------------------------------------------------------      Muscle Strength Right Left  Muscle Strength Right Left   Deltoid 5/5 5/5  Hip Adductors 5/5 5/5   Biceps 5/5 5/5  Hip Abductors 5/5 5/5   Triceps 5/5 5/5  Knee Extensors 5/5 5/5   Wrist Extensors 5/5 5/5  Knee Flexors 5/5 5/5   Wrist Flexors 5/5 5/5  Ankle Extensors 5/5 5/5    5/5 5/5  Ankle Flexors 5/5 5/5   Finger Abductors 5/5 5/5       Hip Flexors 5/5 5/5   Hip Extensors 5/5 5/5      Reflexes:     Right Left   Biceps 2/4 2/4   Brachioradialis 2/4 2/4   Triceps 2/4 2/4   Knee 2/4 2/4   Ankle 2/4 2/4        REVIEW OF ANCILLARY TESTS:   No results found for this or any previous visit

## 2019-02-28 NOTE — PATIENT INSTRUCTIONS
? Reviewed MRI brain wo contrast; no concerning signs  ? Will not start on any new medication at this time as symptoms are mainly concentration and with some reorientation he is able to function well otherwise  ? Encouraged to remain both physically and mentally active, including crossword puzzles, brain teasers to improve both focus and memory  Break down tasks into digestible chunks at a time  Remove distractions when completing a goal  Online resources at [employer] for cognitive training games for free  ? Will refer to Neuropsychology if his deficits should progress  As of now he is mainly distracted but no clear decline and remains very functional     ? The patient has been instructed to call us about any new neurological problems or medication side effects  ?  Return to Clinic on 4 months

## 2019-03-20 ENCOUNTER — TELEPHONE (OUTPATIENT)
Dept: CARDIOLOGY CLINIC | Facility: CLINIC | Age: 79
End: 2019-03-20

## 2019-03-20 NOTE — TELEPHONE ENCOUNTER
Phone call to pt    Recommended ASA strongly to him    81 mg ok but he prefers to stay on 325 mg since he is doing so well  Told him to make sure this was safety or enteric-coated    As far as his wife goes, the indication is softer however she does have risk factors for vascular disease and has tolerated it well and will continue the 81 mg again safety or enteric-coated

## 2019-03-20 NOTE — TELEPHONE ENCOUNTER
Patient called and he and his wife take ASA  One takes 325 and the other 80  He read that "aspirin is not good for you" so they were wondering if they should stop it or want advisement from Dr Cheryl Simons    [phone]

## 2019-03-28 NOTE — PROGRESS NOTES
3/29/2019      Chief Complaint   Patient presents with    Benign Prostatic Hypertrophy       Assessment and Plan    70 y o  male managed by Dr Ayo Horton    1  BHP with incontinence  - PVR 15mL  - as Myrbetriq ineffective, will trial oxybutynin, side effects and dosing reviewed  - continue double dose flomax  - continue proscar    FU 6-8 weeks for symptoms reassessment and PVR testing       History of Present Illness  Carina Valerio is a 70 y o  male here for follow up evaluation of BPH with lower urinary tract symptoms  The patient is currently on double dose Flomax as well as Proscar  He was previously on Myrbetriq 25 mg but states that after 3 months this provided him no benefit so he discontinued it  He does have some bothersome lower urinary tract symptoms which are listed as below  Of note, He underwent a bladder biopsy with instillation of mitomycin-C on February 8, 2017 due to microscopic hematuria and small lesion in his bladder  This was negative pathology  He also has a history of a bladder calculi status post cystolitholapaxy  Review of Systems   Constitutional: Negative for activity change, chills and fever  Gastrointestinal: Negative for abdominal distention and abdominal pain  Musculoskeletal: Negative for back pain and gait problem  Psychiatric/Behavioral: Negative for behavioral problems and confusion  Urinary Incontinence Screening      Most Recent Value   Urinary Incontinence   Urinary Incontinence? Yes [occasional]   Incomplete emptying? Yes [occasional]   Urinary frequency? No   Urinary urgency? No   Urinary hesitancy? No   Dysuria (painful difficult urination)? No   Nocturia (waking up to use the bathroom)? Yes [1-2 times per night]   Straining (having to push to go)? No   Weak stream?  No   Intermittent stream?  No   Post void dribbling?   No          AUA SYMPTOM SCORE      Most Recent Value   AUA SYMPTOM SCORE   How often have you had a sensation of not emptying your bladder completely after you finished urinating? 2   How often have you had to urinate again less than two hours after you finished urinating? 3   How often have you found you stopped and started again several times when you urinate? 5   How often have you found it difficult to postpone urination? 4   How often have you had a weak urinary stream?  4   How often have you had to push or strain to begin urination? 2   How many times did you most typically get up to urinate from the time you went to bed at night until the time you got up in the morning? 2   Quality of Life: If you were to spend the rest of your life with your urinary condition just the way it is now, how would you feel about that?  5   AUA SYMPTOM SCORE  22          Past Medical History  Past Medical History:   Diagnosis Date    Abnormal blood chemistry     Last Assessed: 7/19/2013    Abnormal glucose     Last Assessed: 11/6/2015    Arthritis     Bladder tumor     BPH (benign prostatic hypertrophy)     Cataracts, bilateral     "start of"    Coronary artery disease     2 stents  MI x2    Diabetes mellitus ([facility] [address] )     Diet controlled pre-diabetic    Erectile dysfunction of non-organic origin     Last Assessed: 5/9/2014    Fatigue     Resolved: 2/23/2015    Glaucoma     "Start of"    Hematuria, microscopic     Last Assessed: 11/7/2016    History of bladder stone     Hyperlipidemia     Hypertension     Knee pain, bilateral     If walking far   No assistive devices    Myocardial infarction [facility]) 2011, 2012    x2    Primary insomnia     Last Assessed; 7/20/2016    Recurrent right inguinal hernia     Last Assessed: 2/23/2015    Urinary incontinence     Urinary urgency     Last Assessed: 10/30/2015    Wears glasses        Past Social History  Past Surgical History:   Procedure Laterality Date    BLADDER STONE REMOVAL      Laser procedure    CARDIAC CATHETERIZATION  2003    RCA stenting and 2004 stenting to circuflex    COLONOSCOPY      CORONARY ANGIOPLASTY WITH STENT PLACEMENT      Has 2 stents    CYSTOSCOPY  11/11/2015    w/ Cystolitholapaxy    HERNIA REPAIR Bilateral 03/05/2015    Inguinal    JOINT REPLACEMENT Bilateral     TKR    JOINT REPLACEMENT Right     Right THR    NM CYSTOURETHROSCOPY,FULGUR <0 5 CM LESN N/A 2/8/2017    Procedure: CYSTOSCOPY WITH BIOPSIES WITH FULGURATION, INSTILLATION OF MYTOMYCIN, DIALATION OF MEATUS ;  Surgeon: Kandice Hurtado MD;  Location: [facility];  Service: Urology    WISDOM TOOTH EXTRACTION       Social History     Tobacco Use   Smoking Status Former Smoker    Types: Cigarettes, Pipe   Smokeless Tobacco Never Used   Tobacco Comment    Quit 50 years ago and only smoked for 1 year       Past Family History  Family History   Problem Relation Age of Onset    No Known Problems Mother         Diabetes per [employer]    No Known Problems Father     Skin cancer Brother     Diabetes Brother     Bipolar disorder Other     Diabetes Family     Hypertension Family     Substance Abuse Neg Hx     Alcohol abuse Neg Hx        Past Social history  Social History     Socioeconomic History    Marital status: /Civil Union     Spouse name: Not on file    Number of children: Not on file    Years of education: Not on file    Highest education level: Not on file   Occupational History    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   Social Needs    Financial resource strain: Not on file    Food insecurity:     Worry: Not on file     Inability: Not on file   [employer] needs:     Medical: Not on file     Non-medical: Not on file   Tobacco Use    Smoking status: Former Smoker     Types: Cigarettes, Pipe    Smokeless tobacco: Never Used    Tobacco comment: Quit 50 years ago and only smoked for 1 year   Substance and Sexual Activity    Alcohol use:  Yes     Alcohol/week: 1 2 oz     Types: 2 Shots of liquor per week     Comment: 2 shots of liquor weekly    Drug use: No    Sexual activity: Not on file   Lifestyle    Physical activity:     Days per week: Not on file     Minutes per session: Not on file    Stress: Not on file   Relationships    Social connections:     Talks on phone: Not on file     Gets together: Not on file     Attends Orthodox service: Not on file     Active member of club or organization: Not on file     Attends meetings of clubs or organizations: Not on file     Relationship status: Not on file    Intimate partner violence:     Fear of current or ex partner: Not on file     Emotionally abused: Not on file     Physically abused: Not on file     Forced sexual activity: Not on file   Other Topics Concern    Not on file   Social History Narrative    Always uses seat belt    Daily caffeine consumption, 2-3 servings a day    Feels safe at home       Current Medications  Current Outpatient Medications   Medication Sig Dispense Refill    acetaminophen (TYLENOL) 325 mg tablet Take 650 mg by mouth every 6 (six) hours as needed for mild pain      amLODIPine (NORVASC) 2 5 mg tablet take 1 tablet by mouth at bedtime 90 tablet 1    aspirin (ECOTRIN) 325 mg EC tablet Take 1 tablet by mouth      cholecalciferol (VITAMIN D3) 1,000 units tablet Take by mouth      finasteride (PROSCAR) 5 mg tablet TAKE 1 TABLET DAILY   90 tablet 3    meloxicam (MOBIC) 15 mg tablet Take 1 tablet (15 mg total) by mouth daily 14 tablet 0    nitroglycerin (NITROSTAT) 0 4 mg SL tablet Place 1 tablet (0 4 mg total) under the tongue every 5 (five) minutes as needed for chest pain 5 tablet 2    Omega-3 Fatty Acids (OMEGA 3 PO) Take 2 capsules by mouth daily      Red Yeast Rice Extract (RED YEAST RICE PO) Take 2 capsules by mouth daily      tamsulosin (FLOMAX) 0 4 mg TAKE 1 CAPSULE BY MOUTH TWICE A  capsule 0    fluticasone (FLONASE) 50 mcg/act nasal spray 2 sprays into each nostril daily for 30 days 1 Bottle 1    Mirabegron ER 25 MG TB24 Take 25 mg by mouth daily for 30 doses 30 tablet 3    valsartan-hydrochlorothiazide (DIOVAN-HCT) 320-25 MG per tablet take 1 tablet by mouth at bedtime 90 tablet 1     No current facility-administered medications for this visit  Allergies  Allergies   Allergen Reactions    Sulfa Antibiotics          The following portions of the patient's history were reviewed and updated as appropriate: allergies, current medications, past medical history, past social history, past surgical history and problem list       Vitals  Vitals:    03/29/19 0915   BP: 124/70   Pulse: 93   Weight: 92 1 kg (203 lb)   Height: 5' 7" (1 702 m)           Physical Exam  Constitutional   General appearance: Patient is seated and in no acute distress, well appearing and well nourished  Head and Face   Head and face: Normal     Eyes   Conjunctiva and lids: No erythema, swelling or discharge  Ears, Nose, Mouth, and Throat   Hearing: Normal     Pulmonary   Respiratory effort: No increased work of breathing or signs of respiratory distress  Cardiovascular   Examination of extremities for edema and/or varicosities: Normal     Abdomen   Abdomen: Non-tender, no masses  Musculoskeletal   Gait and station: Normal     Skin   Skin and subcutaneous tissue: Warm, dry, and intact  No visible lesions or rashes    Psychiatric   Judgment and insight: Normal  Recent and remote memory:  Normal  Mood and affect: Normal      Results  No results found for this or any previous visit (from the past 1 hour(s)) ]  Lab Results   Component Value Date    PSA 0 6 03/12/2018     Lab Results   Component Value Date    GLUCOSE 117 11/05/2015    CALCIUM 10 1 10/26/2018     07/31/2017    K 4 1 10/26/2018    CO2 27 10/26/2018     10/26/2018    BUN 22 10/26/2018    CREATININE 1 29 10/26/2018     Lab Results   Component Value Date    WBC 5 1 03/12/2018    HGB 17 1 03/12/2018    HCT 49 6 03/12/2018    MCV 92 4 03/12/2018     03/12/2018           Orders  No orders of the defined types were placed in this encounter

## 2019-03-29 ENCOUNTER — OFFICE VISIT (OUTPATIENT)
Dept: UROLOGY | Facility: CLINIC | Age: 79
End: 2019-03-29
Payer: COMMERCIAL

## 2019-03-29 VITALS
HEIGHT: 67 IN | WEIGHT: 203 LBS | HEART RATE: 93 BPM | BODY MASS INDEX: 31.86 KG/M2 | SYSTOLIC BLOOD PRESSURE: 124 MMHG | DIASTOLIC BLOOD PRESSURE: 70 MMHG

## 2019-03-29 DIAGNOSIS — N40.1 BENIGN PROSTATIC HYPERPLASIA WITH LOWER URINARY TRACT SYMPTOMS, SYMPTOM DETAILS UNSPECIFIED: Primary | ICD-10-CM

## 2019-03-29 LAB — POST-VOID RESIDUAL VOLUME, ML POC: 15 ML

## 2019-03-29 PROCEDURE — 51798 US URINE CAPACITY MEASURE: CPT | Performed by: PHYSICIAN ASSISTANT

## 2019-03-29 PROCEDURE — 99213 OFFICE O/P EST LOW 20 MIN: CPT | Performed by: PHYSICIAN ASSISTANT

## 2019-03-29 RX ORDER — FINASTERIDE 5 MG/1
5 TABLET, FILM COATED ORAL DAILY
Qty: 90 TABLET | Refills: 3 | Status: SHIPPED | OUTPATIENT
Start: 2019-03-29 | End: 2020-09-18 | Stop reason: SDUPTHER

## 2019-03-29 RX ORDER — OXYBUTYNIN CHLORIDE 10 MG/1
10 TABLET, EXTENDED RELEASE ORAL
Qty: 30 TABLET | Refills: 11 | Status: SHIPPED | OUTPATIENT
Start: 2019-03-29 | End: 2019-05-23 | Stop reason: SDUPTHER

## 2019-05-13 ENCOUNTER — TELEPHONE (OUTPATIENT)
Dept: FAMILY MEDICINE CLINIC | Facility: CLINIC | Age: 79
End: 2019-05-13

## 2019-05-13 DIAGNOSIS — N40.1 BENIGN PROSTATIC HYPERPLASIA WITH LOWER URINARY TRACT SYMPTOMS, SYMPTOM DETAILS UNSPECIFIED: ICD-10-CM

## 2019-05-13 RX ORDER — TAMSULOSIN HYDROCHLORIDE 0.4 MG/1
0.4 CAPSULE ORAL 2 TIMES DAILY
Qty: 180 CAPSULE | Refills: 1 | Status: SHIPPED | OUTPATIENT
Start: 2019-05-13 | End: 2019-11-28 | Stop reason: SDUPTHER

## 2019-05-15 ENCOUNTER — TELEPHONE (OUTPATIENT)
Dept: NEUROLOGY | Facility: CLINIC | Age: 79
End: 2019-05-15

## 2019-05-23 ENCOUNTER — OFFICE VISIT (OUTPATIENT)
Dept: UROLOGY | Facility: CLINIC | Age: 79
End: 2019-05-23
Payer: COMMERCIAL

## 2019-05-23 VITALS
DIASTOLIC BLOOD PRESSURE: 90 MMHG | HEART RATE: 58 BPM | WEIGHT: 202.8 LBS | HEIGHT: 67 IN | BODY MASS INDEX: 31.83 KG/M2 | SYSTOLIC BLOOD PRESSURE: 146 MMHG

## 2019-05-23 DIAGNOSIS — N40.1 BENIGN PROSTATIC HYPERPLASIA WITH URINARY FREQUENCY: Primary | ICD-10-CM

## 2019-05-23 DIAGNOSIS — R35.0 BENIGN PROSTATIC HYPERPLASIA WITH URINARY FREQUENCY: Primary | ICD-10-CM

## 2019-05-23 DIAGNOSIS — N40.1 BENIGN PROSTATIC HYPERPLASIA WITH LOWER URINARY TRACT SYMPTOMS, SYMPTOM DETAILS UNSPECIFIED: ICD-10-CM

## 2019-05-23 LAB — POST-VOID RESIDUAL VOLUME, ML POC: 180 ML

## 2019-05-23 PROCEDURE — 99213 OFFICE O/P EST LOW 20 MIN: CPT | Performed by: PHYSICIAN ASSISTANT

## 2019-05-23 PROCEDURE — 51798 US URINE CAPACITY MEASURE: CPT | Performed by: PHYSICIAN ASSISTANT

## 2019-05-23 RX ORDER — OXYBUTYNIN CHLORIDE 10 MG/1
10 TABLET, EXTENDED RELEASE ORAL
Qty: 90 TABLET | Refills: 3 | Status: SHIPPED | OUTPATIENT
Start: 2019-05-23 | End: 2020-02-20 | Stop reason: SDUPTHER

## 2019-06-20 DIAGNOSIS — I10 BENIGN ESSENTIAL HYPERTENSION: ICD-10-CM

## 2019-06-20 RX ORDER — AMLODIPINE BESYLATE 2.5 MG/1
TABLET ORAL
Qty: 90 TABLET | Refills: 1 | Status: SHIPPED | OUTPATIENT
Start: 2019-06-20 | End: 2020-01-09

## 2019-06-26 ENCOUNTER — OFFICE VISIT (OUTPATIENT)
Dept: NEUROLOGY | Facility: CLINIC | Age: 79
End: 2019-06-26
Payer: COMMERCIAL

## 2019-06-26 VITALS
BODY MASS INDEX: 31.3 KG/M2 | WEIGHT: 199.4 LBS | SYSTOLIC BLOOD PRESSURE: 120 MMHG | HEIGHT: 67 IN | DIASTOLIC BLOOD PRESSURE: 60 MMHG | HEART RATE: 69 BPM

## 2019-06-26 DIAGNOSIS — G31.84 MILD COGNITIVE IMPAIRMENT: Primary | ICD-10-CM

## 2019-06-26 PROCEDURE — 99215 OFFICE O/P EST HI 40 MIN: CPT | Performed by: PSYCHIATRY & NEUROLOGY

## 2019-10-28 NOTE — PROGRESS NOTES
DEPARTMENT OF NEUROLOGICAL SCIENCES  [address] DISORDERS CLINIC        RETURN PATIENT NOTE    Patient: Chad Fuentes Record Number: # [id number]  YOB: 1940  Date of visit: 10/30/2019    Referring provider: Baljinder Castaneda DO    Diagnoses for this encounter:  1  Mild cognitive impairment with memory loss       ASSESSMENT     Impression of this 69 yo male following up for mild forgetfulness, needing more effort to recall where he has placed objects  He remains organized however and his tools are placed back in to their proper position  He admits he has not implemented many of the changes we had discussed before  He had some minor incidents of forgetfulness in ordering food but otherwise no true safety concerns  He has not yet displayed recklessness, impulsivity  Proceed as below, with many of the same recommendations as last visit  PLAN     ? Suggested to use audiobooks since he has trouble with reading  He can use the CD player in his truck for this  ? We had an extensive discussion about use of the smart phone and exercising judgement  Encouraged to replace the passive viewing with becoming more physically and mentally active, including crossword puzzles, brain teasers to improve both focus and memory  If he does use it, encouraged to try neurocognitive games and variety instead of only viewing the news all the time  Break down tasks into digestible chunks  Remove distractions when completing a goal  Online resources at [employer] or [employer] for cognitive training games for free  ? Recommended to continue taking Omega 3 Fish oil supplements, adding turmeric (curcumin) 500-750mg daily and gingko biloba for memory  ? Recommended Mediterranean diet for stroke risk and for memory  ? Encouraged to increase fluid intake to 4-6 glasses of 16oz water a day for hydration, provided cardiac and renal function normal     ?  Advised to stand up slowly and change positions slowly in response to the positional lightheadedness he reports - monitor blood pressures and maintain fluid intake of at least 4-6 glasses of 16oz water a day for hydration, provided cardiac and renal function normal     ? Will not start on any new medication at this time as symptoms are mainly concentration and with some reorientation he is able to function well otherwise  ? The patient has been instructed to call us about any new neurological problems or medication side effects  ? Return to Clinic on 4 months     A total of 40 minutes were spent face-to-face with this patient, of which 50% was spent on counseling and coordination of care  HISTORY OF PRESENT ILLNESS:     Mr Tiana Becker is a 70 y o   right handed male who returns to the [address] [place] [address] for evaluation of memory problems  Last visit 06/26/19  The patient was accompanied today  History was obtained from patient and wife  Interim History  No interim calls to the office  He feels overall unchanged albeit with slightly more forgetfulness  He gives an example of needing tools but harder to recall where it  His wife noted that he had entered the wrong way into a [employer], and forgot to order the sandwiches he was intending  He is taking Fish Oil supplements, natural male vitamin,      INITIAL HISTORY  He feels he first noticed issues between 9-12 months ago in late 2017 - early 2018 and noticing forgetfulness  He states he had had difficulty recalling directions to several previously familiar locales  He has missed paying several of his bills, forgetting to timely perform yardwork maintenance  At first he thought it was related to age, but he is now concerned since now occurring more often and to a wider variety of situations  At times he says he may ask the same question twice in the same conversation   Denies issues with processing language, speaking, losing personal items more often than before (he admits being not very organized at baseline)  Denies any safety-related concerns  He is able to be reminded of things he forgets easily  Denies issues recognizing people or places he was familiar with  No long-term memory concerns  He admits not being as active physically as before  Has no concerns about his sleeping  Denies any personality changes (being irritable, more apathetic, etc)  Denies anxiety or depression  He says he feels very unaccustomed to being retired and having none of his formerly scheduled life       Living Condition and Site: 2nd marriage  Living at own home with wife  Owned a landscaping business formerly, sold the business a few years ago  ADLs: can dress, feed, toilet, and bathe himself  IADLs: he cooks, shops  Can drive without safety concerns, but wife pays the household bills but he had been handling the business side of things       Hyposmia: mild  RBD (REM semi-purposeful body movements): denies  Gait Disturbance:  none  Dementia:  See above  MOCA: 24/30 in 10/31/18  Hallucination: denies  Exercise Therapy:  Spends most days watching TV or socializing with friends     Family History: Number of First Degree Relatives With Parkinsonism: none, and none for Dementia     Imaging: none      REVIEW OF PAST MEDICAL, SOCIAL AND FAMILY HISTORY:  This is the list of problems as per our Medical Records:    Patient Active Problem List    Diagnosis Date Noted    Mild cognitive impairment with memory loss 10/31/2019    Chronic nasal congestion 10/26/2018    Hypertension 07/11/2018    Olecranon bursitis of left elbow 05/18/2018    Memory problem 04/26/2018    Allergic rhinitis 03/08/2018    Primary insomnia 08/30/2017    Seborrheic keratoses, inflamed 07/19/2017    Symptomatic bradycardia 07/20/2016    Osteoarthritis of right hip 05/02/2016    Bladder calculus 10/30/2015    Benign essential hypertension 03/20/2015    Arteriosclerosis of coronary artery 04/29/2014    DM[facility], type 2) [facility] 04/29/2014    Vitamin D deficiency 04/17/2014    Benign prostatic hyperplasia 06/04/2012    Hyperlipidemia 06/04/2012     No Known Allergies     Outpatient Encounter Medications as of 10/30/2019   Medication Sig Dispense Refill    acetaminophen (TYLENOL) 325 mg tablet Take 650 mg by mouth every 6 (six) hours as needed for mild pain      amLODIPine (NORVASC) 2 5 mg tablet take 1 tablet by mouth at bedtime 90 tablet 1    aspirin (ECOTRIN) 325 mg EC tablet Take 1 tablet by mouth      cholecalciferol (VITAMIN D3) 1,000 units tablet Take by mouth      finasteride (PROSCAR) 5 mg tablet Take 1 tablet (5 mg total) by mouth daily 90 tablet 3    ketoconazole (NIZORAL) 2 % cream APPLY TO AFFECTED AREAS EVERY DAY  0    meloxicam (MOBIC) 15 mg tablet Take 1 tablet (15 mg total) by mouth daily 14 tablet 0    nitroglycerin (NITROSTAT) 0 4 mg SL tablet Place 1 tablet (0 4 mg total) under the tongue every 5 (five) minutes as needed for chest pain 5 tablet 2    Omega-3 Fatty Acids (OMEGA 3 PO) Take 2 capsules by mouth daily      oxybutynin (DITROPAN-XL) 10 MG 24 hr tablet Take 1 tablet (10 mg total) by mouth daily at bedtime 90 tablet 3    Red Yeast Rice Extract (RED YEAST RICE PO) Take 2 capsules by mouth daily      tamsulosin (FLOMAX) 0 4 mg Take 1 capsule (0 4 mg total) by mouth 2 (two) times a day 180 capsule 1    valsartan-hydrochlorothiazide (DIOVAN-HCT) 320-25 MG per tablet take 1 tablet by mouth at bedtime 90 tablet 1    fluticasone (FLONASE) 50 mcg/act nasal spray 2 sprays into each nostril daily for 30 days 1 Bottle 1    Mirabegron ER 25 MG TB24 Take 25 mg by mouth daily for 30 doses (Patient not taking: Reported on 6/26/2019) 30 tablet 3     No facility-administered encounter medications on file as of 10/30/2019        REVIEW OF SYSTEMS:  The patient has entered data on an intake form regarding present illness, past medical and surgical history, medications, allergies, family and social history, and a full review of 14 systems  I have reviewed this form with the patient, and all the relevant information has been included on this note  The full review of systems was negative except as stated in HPI and below  Constitutional: Negative  Negative for appetite change and fever  HENT: Positive for tinnitus  Negative for hearing loss, trouble swallowing and voice change  Sinus Problems,   Eyes: Negative for photophobia and pain  Dry eyes   Respiratory: Negative  Negative for shortness of breath  Cardiovascular: Negative  Negative for palpitations  Gastrointestinal: Negative  Negative for nausea and vomiting  Endocrine: Negative  Negative for cold intolerance and heat intolerance  Genitourinary: Negative  Negative for dysuria, frequency and urgency  Musculoskeletal: Negative  Negative for myalgias and neck pain  Skin: Negative  Negative for rash  Neurological: Negative for dizziness, tremors, seizures, syncope, facial asymmetry, speech difficulty, weakness, light-headedness, numbness and headaches  Memory problems is about the same /clumsiness   Hematological: Negative  Does not bruise/bleed easily  Psychiatric/Behavioral: Positive for confusion (has to stop to think where to find things )  Negative for hallucinations and sleep disturbance (snoring )  FOCUSED PHYSICAL EXAMINATION:     Vital signs:  /69 (BP Location: Right arm, Patient Position: Sitting, Cuff Size: Standard)   Pulse 69   Ht 5' 7" (1 702 m)   Wt 89 7 kg (197 lb 12 8 oz)   BMI 30 98 kg/m²     General:  Well-appearing, well nourished, pleasant patient in no acute distress  Mood and Fund of Knowledge are appropriate  Head:  Normocephalic, atraumatic  Oropharynx and conjunctiva are clear  Speech  No hypophonia or bradylalia  No scanning speech  Language: Comprehension intact  Neck:  Supple, strong 5/5 forward flexion and retroflexion     Extremities: Range of motion is normal       Cognitive and Mental Exam:  [address] in Oct 2018 was 24/30 with 0/5 item recall   [address] in June 2019 was 24/30 with 0/5 word recall again    He is alert, oriented to self, location and time, he knew the correct year month and day  He was able to calculate the coins in $1 35 correctly  He could list the months of the year backwards from December correctly and without hesitation  He listed the [address] [facility] presidents from [facility] back to [facility] but skipped Obama completely  He got 2/3 word called (mitten, chime, notice)  Cranial Nerves:  Visual fields are full to confrontation  Extraocular movements were full, with normal pursuit and saccades  Pupils were equal, reactive to light symmetrically  Facial sensation to light touch was intact  Face is symmetric with normal strength  Hearing was assessed using the finger rub and was normal     Palate is up going bilaterally and symmetrically  Neck muscles are strong  Tongue protrusion is at midline with normal movements  No dysarthria  Motor:    Dystonia: none today  Dyskinesia: none  Myoclonus: none  Chorea: none  Tics: none      [id number]                Time since last dose:   10/2018    2/28/19 6/26/19    Speech  1  1 1    Facial Expression  1 3 2    Postural Tremor (Right) 0 0  0    Postural Tremor (Left) 0 0 0    Kinetic Tremor (Right)  0 0 0    Kinetic Tremor (Left)  0 0 0    Rest tremor amplitude RUE 0 0 0    Rest tremor amplitude LUE 0 0 0    Rest tremor amplitude RLE 0 0 0    Rest tremor amplitude LLE 0 0 0    Lip/Jaw Tremor  0 0 0    Consistency of tremor 0 0 0    Finger Taps (Right)   1 0 -    Finger Taps (Left)  1 0 -    Hand Movement (Right)  0 0 -    Hand Movement (Left)   1 0 -    Pronation/Supination (Right)  1 1 -    Pronation/Supination (Left)   1 1 -    Toe Tapping (Right) 0 0 -    Toe Tapping (Left) 0 0 -    Leg Agility (Right)  0 0 -    Leg Agility (Left)   1  1 -    Rigidity - Neck  1  0 -    Rigidity - Upper Extremity (Right)  1   0 -    Rigidity - Upper Extremity (Left)   0  0 -    Rigidity - Lower Extremity (Right)  0 0 -    Rigidity - Lower Extremity (Left)   0 0 -    Arising from Chair   0  0 0     Gait   0  0 0    Freezing of Gait 0  0 0    Postural Stability  0  0 -    Posture 0  0 0    Global spontaneity of movement 1  2 reduced arm swing on the R, no resting tremor 1 slow      -------------------------------------------------------------------------------------      Muscle Strength Right Left  Muscle Strength Right Left   Deltoid 5/5 5/5  Hip Adductors 5/5 5/5   Biceps 5/5 5/5  Hip Abductors 5/5 5/5   Triceps 5/5 5/5  Knee Extensors 5/5 5/5   Wrist Extensors 5/5 5/5  Knee Flexors 5/5 5/5   Wrist Flexors 5/5 5/5  Ankle Extensors 5/5 5/5    5/5 5/5  Ankle Flexors 5/5 5/5   Finger Abductors 5/5 5/5       Hip Flexors 5/5 5/5   Hip Extensors 5/5 5/5      Reflexes:     Right Left   Biceps 2/4 2/4   Brachioradialis 2/4 2/4   Triceps 2/4 2/4   Knee 2/4 2/4   Ankle 2/4 2/4

## 2019-10-29 ENCOUNTER — TELEPHONE (OUTPATIENT)
Dept: NEUROLOGY | Facility: CLINIC | Age: 79
End: 2019-10-29

## 2019-10-30 ENCOUNTER — OFFICE VISIT (OUTPATIENT)
Dept: NEUROLOGY | Facility: CLINIC | Age: 79
End: 2019-10-30
Payer: COMMERCIAL

## 2019-10-30 VITALS
WEIGHT: 197.8 LBS | BODY MASS INDEX: 31.04 KG/M2 | HEART RATE: 69 BPM | HEIGHT: 67 IN | DIASTOLIC BLOOD PRESSURE: 69 MMHG | SYSTOLIC BLOOD PRESSURE: 128 MMHG

## 2019-10-30 DIAGNOSIS — G31.84 MILD COGNITIVE IMPAIRMENT WITH MEMORY LOSS: Primary | ICD-10-CM

## 2019-10-30 PROCEDURE — 99215 OFFICE O/P EST HI 40 MIN: CPT | Performed by: PSYCHIATRY & NEUROLOGY

## 2019-10-30 RX ORDER — KETOCONAZOLE 20 MG/G
CREAM TOPICAL
Refills: 0 | COMMUNITY
Start: 2019-07-24

## 2019-10-30 NOTE — PATIENT INSTRUCTIONS
? Suggested to use audiobooks since he has trouble with reading  He can use the CD player in his truck for this  ? We had an extensive discussion about use of the smart phone and exercising judgement  Encouraged to replace the passive viewing with becoming more physically and mentally active, including crossword puzzles, brain teasers to improve both focus and memory  If he does use it, encouraged to try neurocognitive games and variety instead of only viewing the news all the time  Break down tasks into digestible chunks  Remove distractions when completing a goal  Online resources at [employer] or [employer] for cognitive training games for free  ? Recommended to continue taking Omega 3 Fish oil supplements, adding turmeric (curcumin) 500-750mg daily and gingko biloba for memory  ? Recommended Mediterranean diet for stroke risk and for memory  ? Encouraged to increase fluid intake to 4-6 glasses of 16oz water a day for hydration, provided cardiac and renal function normal     ? Advised to stand up slowly and change positions slowly in response to the positional lightheadedness he reports - monitor blood pressures and maintain fluid intake of at least 4-6 glasses of 16oz water a day for hydration, provided cardiac and renal function normal     ? Will not start on any new medication at this time as symptoms are mainly concentration and with some reorientation he is able to function well otherwise  ? The patient has been instructed to call us about any new neurological problems or medication side effects  ?  Return to Clinic on 4 months

## 2019-10-30 NOTE — PROGRESS NOTES
Review of Systems   Constitutional: Negative  Negative for appetite change and fever  HENT: Positive for tinnitus  Negative for hearing loss, trouble swallowing and voice change  Sinus Problems,    Eyes: Negative for photophobia and pain  Dry eyes    Respiratory: Negative  Negative for shortness of breath  Cardiovascular: Negative  Negative for palpitations  Gastrointestinal: Negative  Negative for nausea and vomiting  Endocrine: Negative  Negative for cold intolerance and heat intolerance  Genitourinary: Negative  Negative for dysuria, frequency and urgency  Musculoskeletal: Negative  Negative for myalgias and neck pain  Skin: Negative  Negative for rash  Neurological: Negative for dizziness, tremors, seizures, syncope, facial asymmetry, speech difficulty, weakness, light-headedness, numbness and headaches  Memory problems is about the same /clumsiness    Hematological: Negative  Does not bruise/bleed easily  Psychiatric/Behavioral: Positive for confusion (has to stop to think where to find things )  Negative for hallucinations and sleep disturbance (snoring )

## 2019-10-30 NOTE — LETTER
October 31, 2019     Denise Salas, [address] [facility] [address]  [address]    Patient: Ron Riley   YOB: 1940   Date of Visit: 10/30/2019       Dear Dr Reyes Chapa:    Earlier today I saw Mr  Ishmael Beth for follow-up of his moderate cognitive impairment  Below are my notes for this visit for your records and to keep you updated on his health status  If you have questions, please do not hesitate to call me  I look forward to following your patient along with you  Sincerely,        Bhupinder Montanez MD        CC: No Recipients  Bhupinder Montanez MD  10/31/2019 12:34 AM  Sign at close encounter  [address] [place] PATIENT NOTE    Patient: Chad Fuentes Record Number: # [id number]  YOB: 1940  Date of visit: 10/30/2019    Referring provider: Freedom Dennison DO    Diagnoses for this encounter:  No diagnosis found  ASSESSMENT     Impression of this 71 yo male following up for mild forgetfulness, needing more effort to recall where he has placed objects  He remains organized however and his tools are placed back in to their proper position  He admits he has not implemented many of the changes we had discussed before  He had some minor incidents of forgetfulness in ordering food but otherwise no true safety concerns  He has not yet displayed recklessness, impulsivity  Proceed as below, with many of the same recommendations as last visit  PLAN     ? Suggested to use audiobooks since he has trouble with reading  He can use the CD player in his truck for this  ? We had an extensive discussion about use of the smart phone and exercising judgement  Encouraged to replace the passive viewing with becoming more physically and mentally active, including crossword puzzles, brain teasers to improve both focus and memory   If he does use it, encouraged to try neurocognitive games and variety instead of only viewing the news all the time  Break down tasks into digestible chunks  Remove distractions when completing a goal  Online resources at [employer] or [employer] for cognitive training games for free  ? Recommended to continue taking Omega 3 Fish oil supplements, adding turmeric (curcumin) 500-750mg daily and gingko biloba for memory  ? Recommended Mediterranean diet for stroke risk and for memory  ? Encouraged to increase fluid intake to 4-6 glasses of 16oz water a day for hydration, provided cardiac and renal function normal     ? Advised to stand up slowly and change positions slowly in response to the positional lightheadedness he reports - monitor blood pressures and maintain fluid intake of at least 4-6 glasses of 16oz water a day for hydration, provided cardiac and renal function normal     ? Will not start on any new medication at this time as symptoms are mainly concentration and with some reorientation he is able to function well otherwise  ? The patient has been instructed to call us about any new neurological problems or medication side effects  ? Return to Clinic on 4 months     A total of 40 minutes were spent face-to-face with this patient, of which 50% was spent on counseling and coordination of care  HISTORY OF PRESENT ILLNESS:     Mr Gayle Pedersen is a 70 y o   right handed male who returns to the [address] [place] [address] for evaluation of memory problems  Last visit 06/26/19  The patient was accompanied today  History was obtained from patient and wife  Interim History  No interim calls to the office  He feels overall unchanged albeit with slightly more forgetfulness  He gives an example of needing tools but harder to recall where it  His wife noted that he had entered the wrong way into a [employer], and forgot to order the sandwiches he was intending   He is taking Fish Oil supplements, natural male vitamin,      INITIAL HISTORY  He feels he first noticed issues between 9-12 months ago in late 2017 - early 2018 and noticing forgetfulness  He states he had had difficulty recalling directions to several previously familiar locales  He has missed paying several of his bills, forgetting to timely perform yardwork maintenance  At first he thought it was related to age, but he is now concerned since now occurring more often and to a wider variety of situations  At times he says he may ask the same question twice in the same conversation  Denies issues with processing language, speaking, losing personal items more often than before (he admits being not very organized at baseline)  Denies any safety-related concerns  He is able to be reminded of things he forgets easily  Denies issues recognizing people or places he was familiar with  No long-term memory concerns  He admits not being as active physically as before  Has no concerns about his sleeping  Denies any personality changes (being irritable, more apathetic, etc)  Denies anxiety or depression  He says he feels very unaccustomed to being retired and having none of his formerly scheduled life       Living Condition and Site: 2nd marriage  Living at own home with wife  Owned a landscaping business formerly, sold the business a few years ago  ADLs: can dress, feed, toilet, and bathe himself  IADLs: he cooks, shops  Can drive without safety concerns, but wife pays the household bills but he had been handling the business side of things       Hyposmia: mild  RBD (REM semi-purposeful body movements): denies  Gait Disturbance:  none  Dementia:  See above  MOCA: 24/30 in 10/31/18  Hallucination: denies  Exercise Therapy:  Spends most days watching TV or socializing with friends     Family History: Number of First Degree Relatives With Parkinsonism: none, and none for Dementia     Imaging: none      REVIEW OF PAST MEDICAL, SOCIAL AND FAMILY HISTORY:  This is the list of problems as per our Medical Records:    Patient Active Problem List    Diagnosis Date Noted    Chronic nasal congestion 10/26/2018    Hypertension 07/11/2018    Olecranon bursitis of left elbow 05/18/2018    Memory problem 04/26/2018    Allergic rhinitis 03/08/2018    Primary insomnia 08/30/2017    Seborrheic keratoses, inflamed 07/19/2017    Symptomatic bradycardia 07/20/2016    Osteoarthritis of right hip 05/02/2016    Bladder calculus 10/30/2015    Benign essential hypertension 03/20/2015    Arteriosclerosis of coronary artery 04/29/2014    DM2 (diabetes mellitus, type 2) ([facility] [address] ) 04/29/2014    Vitamin D deficiency 04/17/2014    Benign prostatic hyperplasia 06/04/2012    Hyperlipidemia 06/04/2012     No Known Allergies     Outpatient Encounter Medications as of 10/30/2019   Medication Sig Dispense Refill    acetaminophen (TYLENOL) 325 mg tablet Take 650 mg by mouth every 6 (six) hours as needed for mild pain      amLODIPine (NORVASC) 2 5 mg tablet take 1 tablet by mouth at bedtime 90 tablet 1    aspirin (ECOTRIN) 325 mg EC tablet Take 1 tablet by mouth      cholecalciferol (VITAMIN D3) 1,000 units tablet Take by mouth      finasteride (PROSCAR) 5 mg tablet Take 1 tablet (5 mg total) by mouth daily 90 tablet 3    ketoconazole (NIZORAL) 2 % cream APPLY TO AFFECTED AREAS EVERY DAY  0    meloxicam (MOBIC) 15 mg tablet Take 1 tablet (15 mg total) by mouth daily 14 tablet 0    nitroglycerin (NITROSTAT) 0 4 mg SL tablet Place 1 tablet (0 4 mg total) under the tongue every 5 (five) minutes as needed for chest pain 5 tablet 2    Omega-3 Fatty Acids (OMEGA 3 PO) Take 2 capsules by mouth daily      oxybutynin (DITROPAN-XL) 10 MG 24 hr tablet Take 1 tablet (10 mg total) by mouth daily at bedtime 90 tablet 3    Red Yeast Rice Extract (RED YEAST RICE PO) Take 2 capsules by mouth daily      tamsulosin (FLOMAX) 0 4 mg Take 1 capsule (0 4 mg total) by mouth 2 (two) times a day 180 capsule 1    valsartan-hydrochlorothiazide (DIOVAN-HCT) 320-25 MG per tablet take 1 tablet by mouth at bedtime 90 tablet 1    fluticasone (FLONASE) 50 mcg/act nasal spray 2 sprays into each nostril daily for 30 days 1 Bottle 1    Mirabegron ER 25 MG TB24 Take 25 mg by mouth daily for 30 doses (Patient not taking: Reported on 6/26/2019) 30 tablet 3     No facility-administered encounter medications on file as of 10/30/2019  REVIEW OF SYSTEMS:  The patient has entered data on an intake form regarding present illness, past medical and surgical history, medications, allergies, family and social history, and a full review of 14 systems  I have reviewed this form with the patient, and all the relevant information has been included on this note  The full review of systems was negative except as stated in HPI and below  Constitutional: Negative  Negative for appetite change and fever  HENT: Positive for tinnitus  Negative for hearing loss, trouble swallowing and voice change  Sinus Problems,   Eyes: Negative for photophobia and pain  Dry eyes   Respiratory: Negative  Negative for shortness of breath  Cardiovascular: Negative  Negative for palpitations  Gastrointestinal: Negative  Negative for nausea and vomiting  Endocrine: Negative  Negative for cold intolerance and heat intolerance  Genitourinary: Negative  Negative for dysuria, frequency and urgency  Musculoskeletal: Negative  Negative for myalgias and neck pain  Skin: Negative  Negative for rash  Neurological: Negative for dizziness, tremors, seizures, syncope, facial asymmetry, speech difficulty, weakness, light-headedness, numbness and headaches  Memory problems is about the same /clumsiness   Hematological: Negative  Does not bruise/bleed easily  Psychiatric/Behavioral: Positive for confusion (has to stop to think where to find things )  Negative for hallucinations and sleep disturbance (snoring )       FOCUSED PHYSICAL EXAMINATION:     Vital signs:  /69 (BP Location: Right arm, Patient Position: Sitting, Cuff Size: Standard)   Pulse 69   Ht 5' 7" (1 702 m)   Wt 89 7 kg (197 lb 12 8 oz)   BMI 30 98 kg/m²      General:  Well-appearing, well nourished, pleasant patient in no acute distress  Mood and Fund of Knowledge are appropriate  Head:  Normocephalic, atraumatic  Oropharynx and conjunctiva are clear  Speech  No hypophonia or bradylalia  No scanning speech  Language: Comprehension intact  Neck:  Supple, strong 5/5 forward flexion and retroflexion  Extremities: Range of motion is normal       Cognitive and Mental Exam:  MOCA in Oct 2018 was 24/30 with 0/5 item recall   [address] in June 2019 was 24/30 with 0/5 word recall again    He is alert, oriented to self, location and time, he knew the correct year month and day  He was able to calculate the coins in $1 35 correctly  He could list the months of the year backwards from December correctly and without hesitation  He listed the [employer] presidents from [facility] back to [facility] but skipped [employer] completely  He got 2/3 word called (mitten, chime, notice)  Cranial Nerves:  Visual fields are full to confrontation  Extraocular movements were full, with normal pursuit and saccades  Pupils were equal, reactive to light symmetrically  Facial sensation to light touch was intact  Face is symmetric with normal strength  Hearing was assessed using the finger rub and was normal     Palate is up going bilaterally and symmetrically  Neck muscles are strong  Tongue protrusion is at midline with normal movements  No dysarthria  Motor:    Dystonia: none today  Dyskinesia: none  Myoclonus: none  Chorea: none  Tics: none      UPDRSIII                Time since last dose:   10/2018    2/28/19 6/26/19    Speech  1  1 1    Facial Expression  1 3 2    Postural Tremor (Right) 0 0  0    Postural Tremor (Left) 0 0 0    Kinetic Tremor (Right)  0 0 0    Kinetic Tremor (Left)  0 0 0    Rest tremor amplitude RUE 0 0 0    Rest tremor amplitude LUE 0 0 0 Rest tremor amplitude RLE 0 0 0    Rest tremor amplitude LLE 0 0 0    Lip/Jaw Tremor  0 0 0    Consistency of tremor 0 0 0    Finger Taps (Right)   1 0 -    Finger Taps (Left)  1 0 -    Hand Movement (Right)  0 0 -    Hand Movement (Left)   1 0 -    Pronation/Supination (Right)  1 1 -    Pronation/Supination (Left)   1 1 -    Toe Tapping (Right) 0 0 -    Toe Tapping (Left) 0 0 -    Leg Agility (Right)  0 0 -    Leg Agility (Left)   1  1 -    Rigidity - Neck  1  0 -    Rigidity - Upper Extremity (Right)  1   0 -    Rigidity - Upper Extremity (Left)   0  0 -    Rigidity - Lower Extremity (Right)  0 0 -    Rigidity - Lower Extremity (Left)   0 0 -    Arising from Chair   0  0 0     Gait   0  0 0    Freezing of Gait 0  0 0    Postural Stability  0  0 -    Posture 0  0 0    Global spontaneity of movement 1  2 reduced arm swing on the R, no resting tremor 1 slow      -------------------------------------------------------------------------------------      Muscle Strength Right Left  Muscle Strength Right Left   Deltoid 5/5 5/5  Hip Adductors 5/5 5/5   Biceps 5/5 5/5  Hip Abductors 5/5 5/5   Triceps 5/5 5/5  Knee Extensors 5/5 5/5   Wrist Extensors 5/5 5/5  Knee Flexors 5/5 5/5   Wrist Flexors 5/5 5/5  Ankle Extensors 5/5 5/5    5/5 5/5  Ankle Flexors 5/5 5/5   Finger Abductors 5/5 5/5       Hip Flexors 5/5 5/5   Hip Extensors 5/5 5/5      Reflexes:     Right Left   Biceps 2/4 2/4   Brachioradialis 2/4 2/4   Triceps 2/4 2/4   Knee 2/4 2/4   Ankle 2/4 2/4

## 2019-10-31 PROBLEM — G31.84 MILD COGNITIVE IMPAIRMENT WITH MEMORY LOSS: Status: ACTIVE | Noted: 2019-10-31

## 2019-11-19 DIAGNOSIS — I10 BENIGN ESSENTIAL HYPERTENSION: ICD-10-CM

## 2019-11-21 DIAGNOSIS — I10 BENIGN ESSENTIAL HYPERTENSION: ICD-10-CM

## 2019-11-22 RX ORDER — VALSARTAN AND HYDROCHLOROTHIAZIDE 320; 25 MG/1; MG/1
TABLET, FILM COATED ORAL
Qty: 90 TABLET | Refills: 0 | Status: SHIPPED | OUTPATIENT
Start: 2019-11-22 | End: 2020-05-30 | Stop reason: SDUPTHER

## 2019-11-22 RX ORDER — AMLODIPINE BESYLATE 2.5 MG/1
TABLET ORAL
Qty: 90 TABLET | Refills: 1 | OUTPATIENT
Start: 2019-11-22

## 2019-11-22 NOTE — TELEPHONE ENCOUNTER
Call patient  We received a request to refill his blood pressure medication  He has not been in the office to see Dr Macho Arteaga  for over a year  Please make him an appointment to be seen for his regular checkup and an AWV with Dr Macho Arteaga

## 2019-11-28 DIAGNOSIS — N40.1 BENIGN PROSTATIC HYPERPLASIA WITH LOWER URINARY TRACT SYMPTOMS, SYMPTOM DETAILS UNSPECIFIED: ICD-10-CM

## 2019-11-28 RX ORDER — TAMSULOSIN HYDROCHLORIDE 0.4 MG/1
CAPSULE ORAL
Qty: 180 CAPSULE | Refills: 1 | Status: SHIPPED | OUTPATIENT
Start: 2019-11-28 | End: 2020-03-11

## 2019-12-11 ENCOUNTER — TELEPHONE (OUTPATIENT)
Dept: FAMILY MEDICINE CLINIC | Facility: CLINIC | Age: 79
End: 2019-12-11

## 2019-12-11 NOTE — TELEPHONE ENCOUNTER
I spoke with patient  He had questions regarding Shingrix and possible side effects all questions answered

## 2020-01-09 DIAGNOSIS — I10 BENIGN ESSENTIAL HYPERTENSION: ICD-10-CM

## 2020-01-09 RX ORDER — AMLODIPINE BESYLATE 2.5 MG/1
TABLET ORAL
Qty: 90 TABLET | Refills: 0 | Status: SHIPPED | OUTPATIENT
Start: 2020-01-09 | End: 2020-04-01 | Stop reason: SDUPTHER

## 2020-02-17 DIAGNOSIS — I10 BENIGN ESSENTIAL HYPERTENSION: ICD-10-CM

## 2020-02-17 RX ORDER — VALSARTAN AND HYDROCHLOROTHIAZIDE 320; 25 MG/1; MG/1
1 TABLET, FILM COATED ORAL
Qty: 90 TABLET | Refills: 0 | OUTPATIENT
Start: 2020-02-17

## 2020-02-17 NOTE — TELEPHONE ENCOUNTER
GRAYSON  Pt has enough meds to last this week  He did schedule a med check appt with you for 2/19/20  Thank you!

## 2020-02-17 NOTE — TELEPHONE ENCOUNTER
Please call patient, we have not seen him in the office since October 2018  Unfortunately can no longer refill his blood pressure medication until he is seen    I can send a small quantity ( up to 2 weeks ) to his pharmacy if he would like to give him time to make an appointment

## 2020-02-17 NOTE — TELEPHONE ENCOUNTER
Fax came thru [facility]    Valsartan hctz 320-25 mg tab  Take 1 tab daily @   #90  [facility]    Last refill 11/22/19    LOV 4/29/19  No future appt

## 2020-02-19 ENCOUNTER — OFFICE VISIT (OUTPATIENT)
Dept: FAMILY MEDICINE CLINIC | Facility: CLINIC | Age: 80
End: 2020-02-19
Payer: COMMERCIAL

## 2020-02-19 VITALS
RESPIRATION RATE: 17 BRPM | DIASTOLIC BLOOD PRESSURE: 74 MMHG | OXYGEN SATURATION: 97 % | TEMPERATURE: 98 F | BODY MASS INDEX: 31.39 KG/M2 | SYSTOLIC BLOOD PRESSURE: 134 MMHG | HEART RATE: 70 BPM | HEIGHT: 67 IN | WEIGHT: 200 LBS

## 2020-02-19 DIAGNOSIS — E11.9 TYPE 2 DIABETES MELLITUS WITHOUT COMPLICATION, UNSPECIFIED WHETHER LONG TERM INSULIN USE (HCC): ICD-10-CM

## 2020-02-19 DIAGNOSIS — E78.2 MIXED HYPERLIPIDEMIA: ICD-10-CM

## 2020-02-19 DIAGNOSIS — R32 URINARY INCONTINENCE, UNSPECIFIED TYPE: ICD-10-CM

## 2020-02-19 DIAGNOSIS — N40.0 BENIGN PROSTATIC HYPERPLASIA WITHOUT LOWER URINARY TRACT SYMPTOMS: ICD-10-CM

## 2020-02-19 DIAGNOSIS — Z12.5 SCREENING FOR PROSTATE CANCER: ICD-10-CM

## 2020-02-19 DIAGNOSIS — I10 BENIGN ESSENTIAL HYPERTENSION: Primary | ICD-10-CM

## 2020-02-19 DIAGNOSIS — I25.10 ARTERIOSCLEROSIS OF CORONARY ARTERY: ICD-10-CM

## 2020-02-19 DIAGNOSIS — R41.3 MEMORY PROBLEM: ICD-10-CM

## 2020-02-19 PROCEDURE — 1036F TOBACCO NON-USER: CPT | Performed by: FAMILY MEDICINE

## 2020-02-19 PROCEDURE — 4040F PNEUMOC VAC/ADMIN/RCVD: CPT | Performed by: FAMILY MEDICINE

## 2020-02-19 PROCEDURE — 3075F SYST BP GE 130 - 139MM HG: CPT | Performed by: FAMILY MEDICINE

## 2020-02-19 PROCEDURE — 1160F RVW MEDS BY RX/DR IN RCRD: CPT | Performed by: FAMILY MEDICINE

## 2020-02-19 PROCEDURE — 99214 OFFICE O/P EST MOD 30 MIN: CPT | Performed by: FAMILY MEDICINE

## 2020-02-19 PROCEDURE — 3078F DIAST BP <80 MM HG: CPT | Performed by: FAMILY MEDICINE

## 2020-02-19 PROCEDURE — 3008F BODY MASS INDEX DOCD: CPT | Performed by: FAMILY MEDICINE

## 2020-02-19 NOTE — PROGRESS NOTES
[facility]      NAME: Dl Torres  AGE: 70 y o  SEX: male  : 1940   MRN: [id number]    DATE: 2020  TIME: 11:44 AM    Assessment and Plan     Problem List Items Addressed This Visit     Arteriosclerosis of coronary artery       RCA and circumflex disease  Continue follow-up with cardiology  Patient denies any chest pain or shortness of breath         Benign essential hypertension - Primary      Reasonably controlled on valsartan /25 and amlodipine 2 5 mg daily  Will continue to monitor         Relevant Orders    CBC and differential    TSH, 3rd generation with Free T4 reflex    Benign prostatic hyperplasia      Overall has improved since starting Proscar 5 and tamsulosin 0 5  Still sees urologist regularly  Patient does experience some urinary leakage, but since he started cranberry juice, this is much diminished         [facility]       Lab Results   Component Value Date    HGBA1C 6 6 (H) 10/26/2018    last A1c 6 6%  Continue diet control  We will recheck A1c in near future  Will call with results         Relevant Orders    Comprehensive metabolic panel    Hemoglobin A1C    TSH, 3rd generation with Free T4 reflex    Hyperlipidemia      Continue red yeast rice and Omega 3  Will check lipids in near future  Will call         Relevant Orders    Lipid Panel with Direct LDL reflex    Memory problem      Being followed by Neurology         Urinary incontinence     ( see BPH)  Has improved considerably since starting cranberry juice  Continue follow-up with urology as directed           Other Visit Diagnoses     BMI 31 0-31 9,adult        Screening for prostate cancer        Relevant Orders    PSA, Total Screen              Return to office in:   Will call with lab results  Follow-up 6 months (AWV)    Current w/ influenza, pneumonia, shingrix  Last colonoscopy 4 years ago  Pt was told by GI that he no longer needs to have these done  Chief Complaint     Chief Complaint   Patient presents with    Follow-up     meds check up       History of Present Illness      Patient presents for recheck chronic medical problems today  Overall is feeling well  Doing well on valsartan and amlodipine for blood pressure  Still sees urologist regularly for BPH  Still sees Neurology for memory issues  He has not gotten labs done in a while      The following portions of the patient's history were reviewed and updated as appropriate: allergies, current medications, past family history, past medical history, past social history, past surgical history and problem list     Review of Systems   Review of Systems   Respiratory: Negative  Cardiovascular: Negative  Gastrointestinal: Negative  Genitourinary: Negative  Active Problem List     Patient Active Problem List   Diagnosis    Arteriosclerosis of coronary artery    Benign essential hypertension    Benign prostatic hyperplasia    DM2 [facility]diabetes mellitus[facility] 2) ([facility]    Hyperlipidemia    Osteoarthritis of right hip    Primary insomnia    Vitamin D deficiency    Allergic rhinitis    Memory problem    Olecranon bursitis of left elbow    Bladder calculus    Seborrheic keratoses, inflamed    Symptomatic bradycardia    Hypertension    Chronic nasal congestion    Mild cognitive impairment with memory loss    Urinary incontinence       Objective   /74   Pulse 70   Temp 98 °F (36 7 °C) (Tympanic)   Resp 17   Ht 5' 6 93" (1 7 m)   Wt 90 7 kg (200 lb)   SpO2 97%   BMI 31 39 kg/m²   Right Foot/Ankle   Right Foot Inspection  Skin Exam: skin normal and skin intact no dry skin, no warmth, no callus, no erythema, no maceration, no abnormal color, no pre-ulcer, no ulcer and no callus                          Toe Exam: ROM and strength within normal limits  Sensory       Monofilament testing: intact  Vascular  Capillary refills: < 3 seconds  The right DP pulse is 2+   The right PT pulse is 2+  Left Foot/Ankle  Left Foot Inspection  Skin Exam: skin normal and skin intactno dry skin, no warmth, no erythema, no maceration, normal color, no pre-ulcer, no ulcer and no callus                         Toe Exam: ROM and strength within normal limits                   Sensory       Monofilament: intact  Vascular  Capillary refills: < 3 seconds  The left DP pulse is 2+  The left PT pulse is 2+  Assign Risk Category:  No deformity present; No loss of protective sensation; No weak pulses       Risk: 0    Physical Exam   Cardiovascular: Normal rate, regular rhythm, normal heart sounds and intact distal pulses  Pulses are no weak pulses  Pulses:       Dorsalis pedis pulses are 2+ on the right side, and 2+ on the left side  Posterior tibial pulses are 2+ on the right side, and 2+ on the left side  Carotids: no bruits  Ext: no edema   Pulmonary/Chest: Effort normal  No respiratory distress  He has no wheezes  He has no rales  Feet:   Right Foot:   Skin Integrity: Negative for ulcer, skin breakdown, erythema, warmth, callus or dry skin  Left Foot:   Skin Integrity: Negative for ulcer, skin breakdown, erythema, warmth, callus or dry skin  Psychiatric: He has a normal mood and affect   His behavior is normal  Thought content normal        Pertinent Laboratory/Diagnostic Studies:   due for labs    Current Medications     Current Outpatient Medications:     acetaminophen (TYLENOL) 325 mg tablet, Take 650 mg by mouth every 6 (six) hours as needed for mild pain, Disp: , Rfl:     amLODIPine (NORVASC) 2 5 mg tablet, take 1 tablet by mouth at bedtime, Disp: 90 tablet, Rfl: 0    aspirin (ECOTRIN) 325 mg EC tablet, Take 1 tablet by mouth, Disp: , Rfl:     cholecalciferol (VITAMIN D3) 1,000 units tablet, Take by mouth, Disp: , Rfl:     finasteride (PROSCAR) 5 mg tablet, Take 1 tablet (5 mg total) by mouth daily, Disp: 90 tablet, Rfl: 3    nitroglycerin (NITROSTAT) 0 4 mg SL tablet, Place 1 tablet (0 4 mg total) under the tongue every 5 (five) minutes as needed for chest pain, Disp: 5 tablet, Rfl: 2    Omega-3 Fatty Acids (OMEGA 3 PO), Take 2 capsules by mouth daily, Disp: , Rfl:     oxybutynin (DITROPAN-XL) 10 MG 24 hr tablet, Take 1 tablet (10 mg total) by mouth daily at bedtime, Disp: 90 tablet, Rfl: 3    Red Yeast Rice Extract (RED YEAST RICE PO), Take 2 capsules by mouth daily, Disp: , Rfl:     tamsulosin (FLOMAX) 0 4 mg, take 1 capsule by mouth twice a day, Disp: 180 capsule, Rfl: 1    valsartan-hydrochlorothiazide (DIOVAN-HCT) 320-25 MG per tablet, take 1 tablet by mouth at bedtime, Disp: 90 tablet, Rfl: 0    fluticasone (FLONASE) 50 mcg/act nasal spray, 2 sprays into each nostril daily for 30 days, Disp: 1 Bottle, Rfl: 1    ketoconazole (NIZORAL) 2 % cream, APPLY TO AFFECTED AREAS EVERY DAY, Disp: , Rfl: 0    Health Maintenance     Health Maintenance   Topic Date Due    SLP PLAN OF CARE  1940    DM Eye Exam  05/25/1952    DTaP,Tdap,and Td Vaccines (1 - Tdap) 05/25/1953    BMI: Followup Plan  05/25/1960    Fall Risk  04/26/2019    Medicare Annual Wellness Visit (AWV)  04/26/2019    HEMOGLOBIN A1C  04/26/2019    Influenza Vaccine  07/01/2019    Diabetic Foot Exam  10/26/2019    CRC Screening: Colonoscopy  11/30/2020    Depression Screening PHQ  02/19/2021    BMI: Adult  02/19/2021    Pneumococcal Vaccine: 65+ Years  Completed    Pneumococcal Vaccine: Pediatrics (0 to 5 Years) and At-Risk Patients (6 to 59 Years)  Aged Out    HIB Vaccine  Aged Out    Hepatitis B Vaccine  Aged Out    IPV Vaccine  Aged Out    Hepatitis A Vaccine  Aged Out    Meningococcal ACWY Vaccine  Aged Out    HPV Vaccine  Aged [employer] History   Administered Date(s) Administered    INFLUENZA 09/04/2018    Influenza Split High Dose Preservative Free IM 08/30/2017    Pneumococcal Conjugate 13-Valent 03/20/2015    Pneumococcal Polysaccharide PPV23 07/19/2013    Zoster 01/01/2013 Ignacia Roque DO  [facility]  BMI Counseling: Body mass index is 31 39 kg/m²  The BMI is above normal  Nutrition recommendations include 3-5 servings of fruits/vegetables daily

## 2020-02-19 NOTE — PATIENT INSTRUCTIONS
Obesity   AMBULATORY CARE:   Obesity  is when your body mass index (BMI) is greater than 30  Your healthcare provider will use your height and weight to measure your BMI  The risks of obesity include  many health problems, such as injuries or physical disability  You may need tests to check for the following:  · Diabetes     · High blood pressure or high cholesterol     · Heart disease     · Gallbladder or liver disease     · Cancer of the colon, breast, prostate, liver, or kidney     · Sleep apnea     · Arthritis or gout  Seek care immediately if:   · You have a severe headache, confusion, or difficulty speaking  · You have weakness on one side of your body  · You have chest pain, sweating, or shortness of breath  Contact your healthcare provider if:   · You have symptoms of gallbladder or liver disease, such as pain in your upper abdomen  · You have knee or hip pain and discomfort while walking  · You have symptoms of diabetes, such as intense hunger and thirst, and frequent urination  · You have symptoms of sleep apnea, such as snoring or daytime sleepiness  · You have questions or concerns about your condition or care  Treatment for obesity  focuses on helping you lose weight to improve your health  Even a small decrease in BMI can reduce the risk for many health problems  Your healthcare provider will help you set a weight-loss goal   · Lifestyle changes  are the first step in treating obesity  These include making healthy food choices and getting regular physical activity  Your healthcare provider may suggest a weight-loss program that involves coaching, education, and therapy  · Medicine  may help you lose weight when it is used with a healthy diet and physical activity  · Surgery  can help you lose weight if you are very obese and have other health problems  There are several types of weight-loss surgery  Ask your healthcare provider for more information    Be successful losing weight:   · Set small, realistic goals  An example of a small goal is to walk for 20 minutes 5 days a week  Anther goal is to lose 5% of your body weight  · Tell friends, family members, and coworkers about your goals  and ask for their support  Ask a friend to lose weight with you, or join a weight-loss support group  · Identify foods or triggers that may cause you to overeat , and find ways to avoid them  Remove tempting high-calorie foods from your home and workplace  Place a bowl of fresh fruit on your kitchen counter  If stress causes you to eat, then find other ways to cope with stress  · Keep a diary to track what you eat and drink  Also write down how many minutes of physical activity you do each day  Weigh yourself once a week and record it in your diary  Eating changes: You will need to eat 500 to 1,000 fewer calories each day than you currently eat to lose 1 to 2 pounds a week  The following changes will help you cut calories:  · Eat smaller portions  Use small plates, no larger than 9 inches in diameter  Fill your plate half full of fruits and vegetables  Measure your food using measuring cups until you know what a serving size looks like  · Eat 3 meals and 1 or 2 snacks each day  Plan your meals in advance  Faith [place] and eat at home most of the time  Eat slowly  · Eat fruits and vegetables at every meal   They are low in calories and high in fiber, which makes you feel full  Do not add butter, margarine, or cream sauce to vegetables  Use herbs to season steamed vegetables  · Eat less fat and fewer fried foods  Eat more baked or grilled chicken and fish  These protein sources are lower in calories and fat than red meat  Limit fast food  Dress your salads with olive oil and vinegar instead of bottled dressing  · Limit the amount of sugar you eat  Do not drink sugary beverages  Limit alcohol  Activity changes:  Physical activity is good for your body in many ways   It helps you burn calories and build strong muscles  It decreases stress and depression, and improves your mood  It can also help you sleep better  Talk to your healthcare provider before you begin an exercise program   · Exercise for at least 30 minutes 5 days a week  Start slowly  Set aside time each day for physical activity that you enjoy and that is convenient for you  It is best to do both weight training and an activity that increases your heart rate, such as walking, bicycling, or swimming  · Find ways to be more active  Do yard work and housecleaning  Walk up the stairs instead of using elevators  Spend your leisure time going to events that require walking, such as outdoor festivals or fairs  This extra physical activity can help you lose weight and keep it off  Follow up with your healthcare provider as directed: You may need to meet with a dietitian  Write down your questions so you remember to ask them during your visits  © 2017 [address] Information is for End User's use only and may not be sold, redistributed or otherwise used for commercial purposes  All illustrations and images included in CareNotes® are the copyrighted property of A D A M [employer] Inc  or Rashaun Capone  The above information is an  only  It is not intended as medical advice for individual conditions or treatments  Talk to your doctor, nurse or pharmacist before following any medical regimen to see if it is safe and effective for you

## 2020-02-19 NOTE — ASSESSMENT & PLAN NOTE
Lab Results   Component Value Date    HGBA1C 6 6 (H) 10/26/2018    last A1c 6 6%  Continue diet control  We will recheck A1c in near future    Will call with results

## 2020-02-19 NOTE — ASSESSMENT & PLAN NOTE
( see BPH)  Has improved considerably since starting cranberry juice    Continue follow-up with urology as directed

## 2020-02-19 NOTE — ASSESSMENT & PLAN NOTE
RCA and circumflex disease  Continue follow-up with cardiology    Patient denies any chest pain or shortness of breath

## 2020-02-19 NOTE — ASSESSMENT & PLAN NOTE
Overall has improved since starting Proscar 5 and tamsulosin 0 5  Still sees urologist regularly    Patient does experience some urinary leakage, but since he started cranberry juice, this is much diminished

## 2020-02-20 DIAGNOSIS — N40.1 BENIGN PROSTATIC HYPERPLASIA WITH LOWER URINARY TRACT SYMPTOMS, SYMPTOM DETAILS UNSPECIFIED: ICD-10-CM

## 2020-02-20 RX ORDER — OXYBUTYNIN CHLORIDE 10 MG/1
10 TABLET, EXTENDED RELEASE ORAL
Qty: 90 TABLET | Refills: 3 | Status: SHIPPED | OUTPATIENT
Start: 2020-02-20

## 2020-02-20 NOTE — TELEPHONE ENCOUNTER
Refill request from pharmacy  Patient seen by EZNOBIA Casey at [facility] on 05/23/19  Next appointment 05/28/20 with Shayna

## 2020-03-11 DIAGNOSIS — N40.1 BENIGN PROSTATIC HYPERPLASIA WITH LOWER URINARY TRACT SYMPTOMS, SYMPTOM DETAILS UNSPECIFIED: ICD-10-CM

## 2020-03-11 RX ORDER — TAMSULOSIN HYDROCHLORIDE 0.4 MG/1
CAPSULE ORAL
Qty: 180 CAPSULE | Refills: 1 | Status: SHIPPED | OUTPATIENT
Start: 2020-03-11 | End: 2021-06-02

## 2020-04-01 DIAGNOSIS — I10 BENIGN ESSENTIAL HYPERTENSION: ICD-10-CM

## 2020-04-01 RX ORDER — AMLODIPINE BESYLATE 2.5 MG/1
2.5 TABLET ORAL
Qty: 90 TABLET | Refills: 0 | Status: SHIPPED | OUTPATIENT
Start: 2020-04-01 | End: 2020-10-13

## 2020-05-05 ENCOUNTER — TELEPHONE (OUTPATIENT)
Dept: UROLOGY | Facility: CLINIC | Age: 80
End: 2020-05-05

## 2020-05-28 ENCOUNTER — TELEMEDICINE (OUTPATIENT)
Dept: UROLOGY | Facility: CLINIC | Age: 80
End: 2020-05-28
Payer: COMMERCIAL

## 2020-05-28 DIAGNOSIS — N40.1 BENIGN PROSTATIC HYPERPLASIA WITH LOWER URINARY TRACT SYMPTOMS, SYMPTOM DETAILS UNSPECIFIED: Primary | ICD-10-CM

## 2020-05-28 PROCEDURE — 99215 OFFICE O/P EST HI 40 MIN: CPT | Performed by: PHYSICIAN ASSISTANT

## 2020-05-28 PROCEDURE — 1160F RVW MEDS BY RX/DR IN RCRD: CPT | Performed by: PHYSICIAN ASSISTANT

## 2020-05-30 ENCOUNTER — NURSE TRIAGE (OUTPATIENT)
Dept: OTHER | Facility: OTHER | Age: 80
End: 2020-05-30

## 2020-05-30 DIAGNOSIS — I10 BENIGN ESSENTIAL HYPERTENSION: ICD-10-CM

## 2020-05-30 RX ORDER — VALSARTAN AND HYDROCHLOROTHIAZIDE 320; 25 MG/1; MG/1
1 TABLET, FILM COATED ORAL
Qty: 7 TABLET | Refills: 0 | Status: SHIPPED | OUTPATIENT
Start: 2020-05-30 | End: 2020-06-01 | Stop reason: SDUPTHER

## 2020-06-01 DIAGNOSIS — I10 BENIGN ESSENTIAL HYPERTENSION: ICD-10-CM

## 2020-06-01 RX ORDER — VALSARTAN AND HYDROCHLOROTHIAZIDE 320; 25 MG/1; MG/1
1 TABLET, FILM COATED ORAL
Qty: 90 TABLET | Refills: 0 | Status: SHIPPED | OUTPATIENT
Start: 2020-06-01 | End: 2020-08-31

## 2020-08-31 DIAGNOSIS — I10 BENIGN ESSENTIAL HYPERTENSION: ICD-10-CM

## 2020-08-31 RX ORDER — VALSARTAN AND HYDROCHLOROTHIAZIDE 320; 25 MG/1; MG/1
TABLET, FILM COATED ORAL
Qty: 90 TABLET | Refills: 0 | Status: SHIPPED | OUTPATIENT
Start: 2020-08-31 | End: 2020-11-27 | Stop reason: SDUPTHER

## 2020-09-03 ENCOUNTER — OFFICE VISIT (OUTPATIENT)
Dept: FAMILY MEDICINE CLINIC | Facility: CLINIC | Age: 80
End: 2020-09-03
Payer: COMMERCIAL

## 2020-09-03 VITALS
HEART RATE: 56 BPM | OXYGEN SATURATION: 98 % | SYSTOLIC BLOOD PRESSURE: 120 MMHG | BODY MASS INDEX: 31.08 KG/M2 | HEIGHT: 67 IN | WEIGHT: 198 LBS | RESPIRATION RATE: 16 BRPM | DIASTOLIC BLOOD PRESSURE: 64 MMHG | TEMPERATURE: 98.4 F

## 2020-09-03 DIAGNOSIS — N40.1 BENIGN PROSTATIC HYPERPLASIA WITH LOWER URINARY TRACT SYMPTOMS, SYMPTOM DETAILS UNSPECIFIED: ICD-10-CM

## 2020-09-03 DIAGNOSIS — R32 URINARY INCONTINENCE, UNSPECIFIED TYPE: ICD-10-CM

## 2020-09-03 DIAGNOSIS — I10 BENIGN ESSENTIAL HYPERTENSION: ICD-10-CM

## 2020-09-03 DIAGNOSIS — E78.2 MIXED HYPERLIPIDEMIA: ICD-10-CM

## 2020-09-03 DIAGNOSIS — Z00.00 ENCOUNTER FOR MEDICARE ANNUAL WELLNESS EXAM: Primary | ICD-10-CM

## 2020-09-03 DIAGNOSIS — R41.3 MEMORY PROBLEM: ICD-10-CM

## 2020-09-03 DIAGNOSIS — I25.10 ARTERIOSCLEROSIS OF CORONARY ARTERY: ICD-10-CM

## 2020-09-03 DIAGNOSIS — E11.9 TYPE 2 DIABETES MELLITUS WITHOUT COMPLICATION, UNSPECIFIED WHETHER LONG TERM INSULIN USE (HCC): ICD-10-CM

## 2020-09-03 PROBLEM — G31.84 MILD COGNITIVE IMPAIRMENT WITH MEMORY LOSS: Status: RESOLVED | Noted: 2019-10-31 | Resolved: 2020-09-03

## 2020-09-03 PROBLEM — L82.0 SEBORRHEIC KERATOSES, INFLAMED: Status: RESOLVED | Noted: 2017-07-19 | Resolved: 2020-09-03

## 2020-09-03 PROBLEM — M70.22 OLECRANON BURSITIS OF LEFT ELBOW: Status: RESOLVED | Noted: 2018-05-18 | Resolved: 2020-09-03

## 2020-09-03 PROCEDURE — 1125F AMNT PAIN NOTED PAIN PRSNT: CPT | Performed by: FAMILY MEDICINE

## 2020-09-03 PROCEDURE — 3074F SYST BP LT 130 MM HG: CPT | Performed by: FAMILY MEDICINE

## 2020-09-03 PROCEDURE — 1036F TOBACCO NON-USER: CPT | Performed by: FAMILY MEDICINE

## 2020-09-03 PROCEDURE — 1170F FXNL STATUS ASSESSED: CPT | Performed by: FAMILY MEDICINE

## 2020-09-03 PROCEDURE — 1101F PT FALLS ASSESS-DOCD LE1/YR: CPT | Performed by: FAMILY MEDICINE

## 2020-09-03 PROCEDURE — G0439 PPPS, SUBSEQ VISIT: HCPCS | Performed by: FAMILY MEDICINE

## 2020-09-03 PROCEDURE — 3725F SCREEN DEPRESSION PERFORMED: CPT | Performed by: FAMILY MEDICINE

## 2020-09-03 PROCEDURE — 3288F FALL RISK ASSESSMENT DOCD: CPT | Performed by: FAMILY MEDICINE

## 2020-09-03 PROCEDURE — 99214 OFFICE O/P EST MOD 30 MIN: CPT | Performed by: FAMILY MEDICINE

## 2020-09-03 PROCEDURE — 3078F DIAST BP <80 MM HG: CPT | Performed by: FAMILY MEDICINE

## 2020-09-03 PROCEDURE — 1160F RVW MEDS BY RX/DR IN RCRD: CPT | Performed by: FAMILY MEDICINE

## 2020-09-03 NOTE — PATIENT INSTRUCTIONS

## 2020-09-03 NOTE — ASSESSMENT & PLAN NOTE
Lab Results   Component Value Date    HGBA1C 6 6 (H) 10/26/2018    last A1c 6 6%  Currently diet controlled  Discussed reduced carb diet exercise  Patient is due for fasting blood work in near future    Will call with results

## 2020-09-03 NOTE — ASSESSMENT & PLAN NOTE
Continue red yeast rice and Omega 3  Patient is due for fasting blood work    Will call with results

## 2020-09-03 NOTE — ASSESSMENT & PLAN NOTE
History of RCA and circumflex disease  Patient is currently doing well without chest pain or shortness of breath    Continue regular follow-up with his cardiologist

## 2020-09-03 NOTE — PROGRESS NOTES
[facility]      NAME: Benitez Banuelos  AGE: 68 y o  SEX: male  : 1940   MRN: [id number]    DATE: 9/3/2020  TIME: 4:09 PM    Assessment and Plan     Problem List Items Addressed This Visit     Arteriosclerosis of coronary artery       History of RCA and circumflex disease  Patient is currently doing well without chest pain or shortness of breath  Continue regular follow-up with his cardiologist         Benign essential hypertension      Well controlled on amlodipine 2 5 and valsartan /25  Benign prostatic hyperplasia       Stable on tamsulosin 0 4 in Proscar 5 mg daily  Continue regular follow-up with urology         DM2 (diabetes mellitus, type 2) ([facility] )       Lab Results   Component Value Date    HGBA1C 6 6 (H) 10/26/2018    last A1c 6 6%  Currently diet controlled  Discussed reduced carb diet exercise  Patient is due for fasting blood work in near future  Will call with results         Hyperlipidemia      Continue red yeast rice and Omega 3  Patient is due for fasting blood work  Will call with results         Memory problem      Stable  Currently being followed by Neurology         Urinary incontinence      Other Visit Diagnoses     Encounter for Medicare annual wellness exam    -  Primary           patient is due /overdue for fasting blood work  He will get this done near future  Will call with results    Return to office in: Six months    Chief Complaint     Chief Complaint   Patient presents with    Medicare Wellness Visit       History of Present Illness      Recheck chronic medical problems today  Overall patient is doing well without complaints  Doing well on valsartan and amlodipine for blood pressure  Doing well on Proscar and tamsulosin for BPH  Still sees neurologist, cardiologist, urologist regularly        The following portions of the patient's history were reviewed and updated as appropriate: allergies, current medications, past family history, past medical history, past social history, past surgical history and problem list     Review of Systems   Review of Systems   Respiratory: Negative  Cardiovascular: Negative  Gastrointestinal: Negative  Genitourinary: Negative  Active Problem List     Patient Active Problem List   Diagnosis    Arteriosclerosis of coronary artery    Benign essential hypertension    Benign prostatic hyperplasia    DM2 (diabetes mellitus, type 2) (HCC)    Hyperlipidemia    Osteoarthritis of right hip    Primary insomnia    Vitamin D deficiency    Allergic rhinitis    Memory problem    Bladder calculus    Symptomatic bradycardia    Hypertension    Chronic nasal congestion    Urinary incontinence       Objective   /64 (BP Location: Left arm, Patient Position: Sitting, Cuff Size: Adult)   Pulse 56   Temp 98 4 °F (36 9 °C) (Tympanic)   Resp 16   Ht 5' 6 93" (1 7 m)   Wt 89 8 kg (198 lb)   SpO2 98%   BMI 31 08 kg/m²     Physical Exam  Cardiovascular:      Rate and Rhythm: Normal rate and regular rhythm  Heart sounds: Normal heart sounds  Comments: Carotids: no bruits  Ext: no edema  Pulmonary:      Effort: Pulmonary effort is normal  No respiratory distress  Breath sounds: No wheezing or rales  Psychiatric:         Behavior: Behavior normal          Thought Content:  Thought content normal          Pertinent Laboratory/Diagnostic Studies:  Hepatology: No results found for: LIPASE, AMMONIA, AFP   last labs reviewed    Current Medications     Current Outpatient Medications:     acetaminophen (TYLENOL) 325 mg tablet, Take 650 mg by mouth every 6 (six) hours as needed for mild pain, Disp: , Rfl:     amLODIPine (NORVASC) 2 5 mg tablet, Take 1 tablet (2 5 mg total) by mouth daily at bedtime, Disp: 90 tablet, Rfl: 0    aspirin (ECOTRIN) 325 mg EC tablet, Take 1 tablet by mouth, Disp: , Rfl:     cholecalciferol (VITAMIN D3) 1,000 units tablet, Take by mouth, Disp: , Rfl:    finasteride (PROSCAR) 5 mg tablet, Take 1 tablet (5 mg total) by mouth daily, Disp: 90 tablet, Rfl: 3    nitroglycerin (NITROSTAT) 0 4 mg SL tablet, Place 1 tablet (0 4 mg total) under the tongue every 5 (five) minutes as needed for chest pain, Disp: 5 tablet, Rfl: 2    Omega-3 Fatty Acids (OMEGA 3 PO), Take 2 capsules by mouth daily, Disp: , Rfl:     oxybutynin (DITROPAN-XL) 10 MG 24 hr tablet, Take 1 tablet (10 mg total) by mouth daily at bedtime, Disp: 90 tablet, Rfl: 3    Red Yeast Rice Extract (RED YEAST RICE PO), Take 2 capsules by mouth daily, Disp: , Rfl:     tamsulosin (FLOMAX) 0 4 mg, take 1 capsule by mouth twice a day, Disp: 180 capsule, Rfl: 1    valsartan-hydrochlorothiazide (DIOVAN-HCT) 320-25 MG per tablet, take 1 tablet by mouth at bedtime, Disp: 90 tablet, Rfl: 0    fluticasone (FLONASE) 50 mcg/act nasal spray, 2 sprays into each nostril daily for 30 days, Disp: 1 Bottle, Rfl: 1    ketoconazole (NIZORAL) 2 % cream, APPLY TO AFFECTED AREAS EVERY DAY, Disp: , Rfl: 0    Health Maintenance     Health Maintenance   Topic Date Due    SLP PLAN OF CARE  1940    DM Eye Exam  05/25/1952    DTaP,Tdap,and Td Vaccines (1 - Tdap) 05/25/1963    Colonoscopy Surveillance  08/31/2010    Medicare Annual Wellness Visit (AWV)  04/26/2019    HEMOGLOBIN A1C  04/26/2019    Influenza Vaccine  07/01/2020    Depression Screening PHQ  02/19/2021    BMI: Followup Plan  02/19/2021    Diabetic Foot Exam  02/19/2021    Fall Risk  09/03/2021    BMI: Adult  09/03/2021    Pneumococcal Vaccine: 65+ Years  Completed    HIB Vaccine  Aged Out    Hepatitis B Vaccine  Aged Out    IPV Vaccine  Aged Out    Hepatitis A Vaccine  Aged Out    Meningococcal ACWY Vaccine  Aged Out    HPV Vaccine  Aged [employer] History   Administered Date(s) Administered    INFLUENZA 09/04/2018    Influenza Split High Dose Preservative Free IM 08/30/2017    Pneumococcal Conjugate 13-Valent 03/20/2015    Pneumococcal Polysaccharide PPV23 07/19/2013    Zoster 01/01/2013    Zoster Vaccine Recombinant 12/12/2019    influenza, trivalent, adjuvanted 09/30/2019       DO At[facility] Coe [facility]

## 2020-09-03 NOTE — PROGRESS NOTES
urol Assessment and Plan:     MEDICARE WELLNESS VISIT  PATIENT DOES NOT HAVE LIVING WILL OR HEALTHCARE POWER OF   DEPRESSION SCREEN AND FALL RISK WERE NEGATIVE  PATIENT REFUSES FLU SHOT  CURRENT WITH PNEUMONIA VACCINE  DISCUSSED COLONOSCOPY AND PSA    Problem List Items Addressed This Visit     Arteriosclerosis of coronary artery    Benign essential hypertension    Benign prostatic hyperplasia    DM2 (diabetes mellitus, type 2) ([facility] )    Hyperlipidemia    Memory problem    Urinary incontinence      Other Visit Diagnoses     Encounter for Medicare annual wellness exam    -  Primary           Preventive health issues were discussed with patient, and age appropriate screening tests were ordered as noted in patient's After Visit Summary  Personalized health advice and appropriate referrals for health education or preventive services given if needed, as noted in patient's After Visit Summary  History of Present Illness:     Patient presents for Welcome to Medicare visit       Patient Care Team:  Clint Carranza DO as PCP - MD Neal Lane PA-C Kelli Beech, MD Wallene Grieves, MD     Review of Systems:     Review of Systems   Problem List:     Patient Active Problem List   Diagnosis    Arteriosclerosis of coronary artery    Benign essential hypertension    Benign prostatic hyperplasia    DM2 (diabetes mellitus, type 2) ([facility] )    Hyperlipidemia    Osteoarthritis of right hip    Primary insomnia    Vitamin D deficiency    Allergic rhinitis    Memory problem    Olecranon bursitis of left elbow    Bladder calculus    Seborrheic keratoses, inflamed    Symptomatic bradycardia    Hypertension    Chronic nasal congestion    Mild cognitive impairment with memory loss    Urinary incontinence      Past Medical and Surgical History:     Past Medical History:   Diagnosis Date    Abnormal blood chemistry     Last Assessed: 7/19/2013    Abnormal glucose     Last Assessed: 11/6/2015    Arthritis     Bladder tumor     BPH (benign prostatic hypertrophy)     Cataracts, bilateral     "start of"    Coronary artery disease     2 stents  MI x2    Diabetes mellitus ([address] )     Diet controlled pre-diabetic    Erectile dysfunction of non-organic origin     Last Assessed: 5/9/2014    Fatigue     Resolved: 2/23/2015    Glaucoma     "Start of"    Hematuria, microscopic     Last Assessed: 11/7/2016    History of bladder stone     Hyperlipidemia     Hypertension     Knee pain, bilateral     If walking far   No assistive devices    Myocardial infarction [facility]) 2011, 2012    x2    Primary insomnia     Last Assessed; 7/20/2016    Recurrent right inguinal hernia     Last Assessed: 2/23/2015    Urinary incontinence     Urinary urgency     Last Assessed: 10/30/2015    Wears glasses      Past Surgical History:   Procedure Laterality Date    BLADDER STONE REMOVAL      Laser procedure    CARDIAC CATHETERIZATION  2003    RCA stenting and 2004 stenting to circuflex    COLONOSCOPY      CORONARY ANGIOPLASTY WITH STENT PLACEMENT      Has 2 stents    CYSTOSCOPY  11/11/2015    w/ Cystolitholapaxy    HERNIA REPAIR Bilateral 03/05/2015    Inguinal    JOINT REPLACEMENT Bilateral     TKR    JOINT REPLACEMENT Right     Right THR    AK CYSTOURETHROSCOPY,FULGUR <0 5 CM MARIO N/A 2/8/2017    Procedure: CYSTOSCOPY WITH BIOPSIES WITH FULGURATION, INSTILLATION OF MYTOMYCIN, DIALATION OF MEATUS ;  Surgeon: Avery Wheeler MD;  Location: [facility];  Service: Urology    WISDOM TOOTH EXTRACTION        Family History:     Family History   Problem Relation Age of Onset    No Known Problems Mother         Diabetes per [employer]    No Known Problems Father     Skin cancer Brother     Diabetes Brother     Bipolar disorder Other     Diabetes Family     Hypertension Family     Substance Abuse Neg Hx     Alcohol abuse Neg Hx       Social History:        Social History     Socioeconomic History    Marital status: /Civil Union     Spouse name: None    Number of children: None    Years of education: None    Highest education level: None   Occupational History    Occupation: landscaping owner   retired   Social Needs    Financial resource strain: None    Food insecurity     Worry: None     Inability: None    Transportation needs     Medical: None     Non-medical: None   Tobacco Use    Smoking status: Former Smoker     Types: Cigarettes, Pipe    Smokeless tobacco: Never Used    Tobacco comment: Quit 50 years ago and only smoked for 1 year   Substance and Sexual Activity    Alcohol use:  Yes     Alcohol/week: 2 0 standard drinks     Types: 2 Shots of liquor per week     Comment: 2 shots of liquor weekly    Drug use: No    Sexual activity: None   Lifestyle    Physical activity     Days per week: None     Minutes per session: None    Stress: None   Relationships    Social connections     Talks on phone: None     Gets together: None     Attends Confucianist service: None     Active member of club or organization: None     Attends meetings of clubs or organizations: None     Relationship status: None    Intimate partner violence     Fear of current or ex partner: None     Emotionally abused: None     Physically abused: None     Forced sexual activity: None   Other Topics Concern    None   Social History Narrative    Always uses seat belt    Daily caffeine consumption, 2-3 servings a day    Feels safe at home      Medications and Allergies:     Current Outpatient Medications   Medication Sig Dispense Refill    acetaminophen (TYLENOL) 325 mg tablet Take 650 mg by mouth every 6 (six) hours as needed for mild pain      amLODIPine (NORVASC) 2 5 mg tablet Take 1 tablet (2 5 mg total) by mouth daily at bedtime 90 tablet 0    aspirin (ECOTRIN) 325 mg EC tablet Take 1 tablet by mouth      cholecalciferol (VITAMIN D3) 1,000 units tablet Take by mouth      finasteride (PROSCAR) 5 mg tablet Take 1 tablet (5 mg total) by mouth daily 90 tablet 3    nitroglycerin (NITROSTAT) 0 4 mg SL tablet Place 1 tablet (0 4 mg total) under the tongue every 5 (five) minutes as needed for chest pain 5 tablet 2    Omega-3 Fatty Acids (OMEGA 3 PO) Take 2 capsules by mouth daily      oxybutynin (DITROPAN-XL) 10 MG 24 hr tablet Take 1 tablet (10 mg total) by mouth daily at bedtime 90 tablet 3    Red Yeast Rice Extract (RED YEAST RICE PO) Take 2 capsules by mouth daily      tamsulosin (FLOMAX) 0 4 mg take 1 capsule by mouth twice a day 180 capsule 1    valsartan-hydrochlorothiazide (DIOVAN-HCT) 320-25 MG per tablet take 1 tablet by mouth at bedtime 90 tablet 0    fluticasone (FLONASE) 50 mcg/act nasal spray 2 sprays into each nostril daily for 30 days 1 Bottle 1    ketoconazole (NIZORAL) 2 % cream APPLY TO AFFECTED AREAS EVERY DAY  0     No current facility-administered medications for this visit  No Known Allergies   Immunizations:     Immunization History   Administered Date(s) Administered    INFLUENZA 09/04/2018    Influenza Split High Dose Preservative Free IM 08/30/2017    Pneumococcal Conjugate 13-Valent 03/20/2015    Pneumococcal Polysaccharide PPV23 07/19/2013    Zoster 01/01/2013    Zoster Vaccine Recombinant 12/12/2019    influenza, trivalent, adjuvanted 09/30/2019      Health Maintenance: There are no preventive care reminders to display for this patient  Topic Date Due    DTaP,Tdap,and Td Vaccines (1 - Tdap) 05/25/1963    Influenza Vaccine  07/01/2020      Medicare Screening Tests and Risk Assessments:         Health Risk Assessment:   Patient rates overall health as good  Patient feels that their physical health rating is same  Eyesight was rated as same  Hearing was rated as same  Patient feels that their emotional and mental health rating is same  Pain experienced in the last 7 days has been none  Patient states that he has experienced no weight loss or gain in last 6 months  Depression Screening:   PHQ-2 Score: 0      Fall Risk Screening: In the past year, patient has experienced: no history of falling in past year      Home Safety:  Patient does not have trouble with stairs inside or outside of their home  Patient has working smoke alarms and has working carbon monoxide detector  Home safety hazards include: none  Nutrition:   Current diet is Regular  Medications:   Patient is currently taking over-the-counter supplements  OTC medications include: see medication list  Patient is able to manage medications  Activities of Daily Living (ADLs)/Instrumental Activities of Daily Living (IADLs):   Walk and transfer into and out of bed and chair?: Yes  Dress and groom yourself?: Yes    Bathe or shower yourself?: Yes    Feed yourself? Yes  Do your laundry/housekeeping?: Yes  Manage your money, pay your bills and track your expenses?: Yes  Make your own meals?: Yes    Do your own shopping?: Yes    Previous Hospitalizations:   Any hospitalizations or ED visits within the last 12 months?: No      Advance Care Planning:   Living will: No    Durable POA for healthcare:  Yes    Advanced directive: No    Advanced directive counseling given: Yes    Five wishes given: Yes    End of Life Decisions reviewed with patient: Yes    Provider agrees with end of life decisions: Yes      Cognitive Screening:   Provider or family/friend/caregiver concerned regarding cognition?: No    PREVENTIVE SCREENINGS      Cardiovascular Screening:    General: Screening Not Indicated and History Lipid Disorder      Diabetes Screening:     General: Screening Not Indicated and History Diabetes      Colorectal Cancer Screening:     General: Risks and Benefits Discussed      Prostate Cancer Screening:    General: Screening Not Indicated      Osteoporosis Screening:    General: Risks and Benefits Discussed      Abdominal Aortic Aneurysm (AAA) Screening:    Risk factors include: tobacco use        General: Risks and Benefits Discussed      Lung Cancer Screening:     General: Screening Not Indicated      Hepatitis C Screening:    General: Risks and Benefits Discussed    No exam data present     Physical Exam:     /64 (BP Location: Left arm, Patient Position: Sitting, Cuff Size: Adult)   Pulse 56   Temp 98 4 °F (36 9 °C) (Tympanic)   Resp 16   Ht 5' 6 93" (1 7 m)   Wt 89 8 kg (198 lb)   SpO2 98%   BMI 31 08 kg/m²     Physical Exam     Rajiv Bill DO

## 2020-09-18 DIAGNOSIS — N40.1 BENIGN PROSTATIC HYPERPLASIA WITH LOWER URINARY TRACT SYMPTOMS, SYMPTOM DETAILS UNSPECIFIED: ICD-10-CM

## 2020-09-18 RX ORDER — FINASTERIDE 5 MG/1
5 TABLET, FILM COATED ORAL DAILY
Qty: 90 TABLET | Refills: 0 | Status: SHIPPED | OUTPATIENT
Start: 2020-09-18 | End: 2021-05-30 | Stop reason: SDUPTHER

## 2020-09-18 NOTE — TELEPHONE ENCOUNTER
Pt called stating he lm on rx line 3 days ago and [facility] had sent a fax to us to refill finasteride 5mg to [place] [employer]   He is out of his meds send asap

## 2020-10-13 DIAGNOSIS — I10 BENIGN ESSENTIAL HYPERTENSION: ICD-10-CM

## 2020-10-13 RX ORDER — AMLODIPINE BESYLATE 2.5 MG/1
TABLET ORAL
Qty: 90 TABLET | Refills: 0 | Status: SHIPPED | OUTPATIENT
Start: 2020-10-13 | End: 2020-11-25

## 2020-11-06 ENCOUNTER — VBI (OUTPATIENT)
Dept: ADMINISTRATIVE | Facility: OTHER | Age: 80
End: 2020-11-06

## 2020-11-24 DIAGNOSIS — I10 BENIGN ESSENTIAL HYPERTENSION: ICD-10-CM

## 2020-11-25 RX ORDER — AMLODIPINE BESYLATE 2.5 MG/1
TABLET ORAL
Qty: 90 TABLET | Refills: 0 | Status: SHIPPED | OUTPATIENT
Start: 2020-11-25 | End: 2021-06-02 | Stop reason: SDUPTHER

## 2020-11-27 DIAGNOSIS — I10 BENIGN ESSENTIAL HYPERTENSION: ICD-10-CM

## 2020-11-27 RX ORDER — VALSARTAN AND HYDROCHLOROTHIAZIDE 320; 25 MG/1; MG/1
1 TABLET, FILM COATED ORAL
Qty: 90 TABLET | Refills: 1 | Status: SHIPPED | OUTPATIENT
Start: 2020-11-27 | End: 2021-08-31 | Stop reason: SDUPTHER

## 2021-02-22 NOTE — PROGRESS NOTES
Assessment & Plan:     E11 9 DM2 (diabetes mellitus, type 2) ([facility] )  I have evaluated the patient and found the condition to be: Stable  I have evaluated the patient and: Recommended continue with same treatment plan    I25 10 Arteriosclerosis of coronary artery   I have evaluated the patient and found the condition to be: Stable  I have evaluated the patient and: Recommended continue with same treatment plan    I10 Benign essential hypertension  I have evaluated the patient and found the condition to be: Stable  I have evaluated the patient and: Recommended continue with same treatment plan    N40 1 Benign prostatic hyperplasia with lower urinary tract symptoms, symptom details unspecified   I have evaluated the patient and found the condition to be: Stable  I have evaluated the patient and: Recommended continue with same treatment plan    E78 2 Mixed hyperlipidemia   I have evaluated the patient and found the condition to be: Stable  I have evaluated the patient and: Recommended continue with same treatment plan    E55 9 Vitamin D deficiency  I have evaluated the patient and found the condition to be: Stable  I have evaluated the patient and: Recommended continue with same treatment plan    R32 Urinary incontinence, unspecified type  I have evaluated the patient and found the condition to be: Stable  I have evaluated the patient and: Recommended continue with same treatment plan         Subjective:     Patient ID: Ron Riley is a 68 y o  male     No chief complaint on file  HPI    Review of Systems    Objective: There were no vitals taken for this visit      Problem List Items Addressed This Visit     None          Physical Exam

## 2021-03-04 ENCOUNTER — OFFICE VISIT (OUTPATIENT)
Dept: FAMILY MEDICINE CLINIC | Facility: CLINIC | Age: 81
End: 2021-03-04
Payer: COMMERCIAL

## 2021-03-04 VITALS
RESPIRATION RATE: 16 BRPM | DIASTOLIC BLOOD PRESSURE: 78 MMHG | TEMPERATURE: 97.5 F | BODY MASS INDEX: 31.86 KG/M2 | OXYGEN SATURATION: 96 % | HEART RATE: 76 BPM | WEIGHT: 203 LBS | SYSTOLIC BLOOD PRESSURE: 120 MMHG | HEIGHT: 67 IN

## 2021-03-04 DIAGNOSIS — I25.10 ARTERIOSCLEROSIS OF CORONARY ARTERY: Primary | ICD-10-CM

## 2021-03-04 DIAGNOSIS — N40.1 BENIGN PROSTATIC HYPERPLASIA WITH LOWER URINARY TRACT SYMPTOMS, SYMPTOM DETAILS UNSPECIFIED: ICD-10-CM

## 2021-03-04 DIAGNOSIS — E78.2 MIXED HYPERLIPIDEMIA: ICD-10-CM

## 2021-03-04 DIAGNOSIS — R41.3 MEMORY PROBLEM: ICD-10-CM

## 2021-03-04 DIAGNOSIS — I10 BENIGN ESSENTIAL HYPERTENSION: ICD-10-CM

## 2021-03-04 DIAGNOSIS — R32 URINARY INCONTINENCE, UNSPECIFIED TYPE: ICD-10-CM

## 2021-03-04 DIAGNOSIS — E11.9 TYPE 2 DIABETES MELLITUS WITHOUT COMPLICATION, UNSPECIFIED WHETHER LONG TERM INSULIN USE (HCC): ICD-10-CM

## 2021-03-04 DIAGNOSIS — Z12.5 SCREENING FOR PROSTATE CANCER: ICD-10-CM

## 2021-03-04 PROCEDURE — T1015 CLINIC SERVICE: HCPCS | Performed by: FAMILY MEDICINE

## 2021-03-04 PROCEDURE — 1036F TOBACCO NON-USER: CPT | Performed by: FAMILY MEDICINE

## 2021-03-04 PROCEDURE — 1160F RVW MEDS BY RX/DR IN RCRD: CPT | Performed by: FAMILY MEDICINE

## 2021-03-04 PROCEDURE — 3078F DIAST BP <80 MM HG: CPT | Performed by: FAMILY MEDICINE

## 2021-03-04 PROCEDURE — 99214 OFFICE O/P EST MOD 30 MIN: CPT | Performed by: FAMILY MEDICINE

## 2021-03-04 PROCEDURE — 3074F SYST BP LT 130 MM HG: CPT | Performed by: FAMILY MEDICINE

## 2021-03-04 NOTE — PROGRESS NOTES
[facility]      NAME: Ismael Terry  AGE: 68 y o  SEX: male  : 1940   MRN: [id number]    DATE: 3/4/2021  TIME: 1:34 PM    Assessment and Plan     Problem List Items Addressed This Visit     Arteriosclerosis of coronary artery - Primary       History of RCA and circumflex disease  Currently asymptomatic  Continue diet exercise  Continue regular follow-up with his cardiologist         Benign essential hypertension      Controlled on amlodipine 2 5 and valsartan /25         Relevant Orders    CBC and differential    TSH, 3rd generation with Free T4 reflex    Benign prostatic hyperplasia      Stable on tamsulosin and Proscar  Continue follow-up with Urology  I will order PSA with his routine labs  DM2 (diabetes mellitus, type 2) ([facility] )       Lab Results   Component Value Date    HGBA1C 6 6 (H) 10/26/2018     Last A1c 6 6%  Diet-controlled diabetic at this time  Patient is due for labs  Will call with results         Relevant Orders    Comprehensive metabolic panel    Hemoglobin A1C    Hyperlipidemia      Continue red yeast rice and Omega 3  Patient due for labs  Will call with results         Relevant Orders    Lipid Panel with Direct LDL reflex    Memory problem      Continue follow-up with Neurology         Urinary incontinence      Stable on oxybutynin 10           Other Visit Diagnoses     Screening for prostate cancer        Relevant Orders    PSA, Total Screen          Pt due for FBW  rx given  Will call w/ results    Return to office in:  6 months (AWV)  Will need labs priior at [address] (cmp/A1c)    Chief Complaint     Chief Complaint   Patient presents with    Follow-up     6 months       History of Present Illness      Patient presents recheck chronic medical problems today  Overall he is feeling well without complaints  Still sees cardiologist and urologist   Doing well on amlodipine and valsartan for blood pressure        The following portions of the patient's history were reviewed and updated as appropriate: allergies, current medications, past family history, past medical history, past social history, past surgical history and problem list     Review of Systems   Review of Systems   Respiratory: Negative  Cardiovascular: Negative  Gastrointestinal: Negative  Genitourinary: Negative  Active Problem List     Patient Active Problem List   Diagnosis    Arteriosclerosis of coronary artery    Benign essential hypertension    Benign prostatic hyperplasia    DM2 [facility]di[facility] mellitus, type 2) ([facility]    Hyperlipidemia    Osteoarthritis of right hip    Primary insomnia    Vitamin D deficiency    Allergic rhinitis    Memory problem    Bladder calculus    Symptomatic bradycardia    Hypertension    Chronic nasal congestion    Urinary incontinence       Objective   /78 (BP Location: Left arm, Patient Position: Sitting, Cuff Size: Adult)   Pulse 76   Temp 97 5 °F (36 4 °C) (Tympanic)   Resp 16   Ht 5' 6 93" (1 7 m)   Wt 92 1 kg (203 lb)   SpO2 96%   BMI 31 86 kg/m²   Right Foot/Ankle   Right Foot Inspection  Skin Exam: skin normal and skin intact no dry skin, no warmth, no callus, no erythema, no maceration, no abnormal color, no pre-ulcer, no ulcer and no callus                          Toe Exam: ROM and strength within normal limits  Sensory       Monofilament testing: intact  Vascular  Capillary refills: < 3 seconds  The right DP pulse is 2+  The right PT pulse is 2+  Left Foot/Ankle  Left Foot Inspection  Skin Exam: skin normal and skin intactno dry skin, no warmth, no erythema, no maceration, normal color, no pre-ulcer, no ulcer and no callus                         Toe Exam: ROM and strength within normal limits                   Sensory       Monofilament: intact  Vascular  Capillary refills: < 3 seconds  The left DP pulse is 2+  The left PT pulse is 2+  Assign Risk Category:  No deformity present;  No loss of protective sensation; No weak pulses       Risk: 0    Physical Exam  Cardiovascular:      Rate and Rhythm: Normal rate and regular rhythm  Pulses: no weak pulses          Dorsalis pedis pulses are 2+ on the right side and 2+ on the left side  Posterior tibial pulses are 2+ on the right side and 2+ on the left side  Heart sounds: Normal heart sounds  Comments: Carotids: no bruits  Ext: no edema  Pulmonary:      Effort: Pulmonary effort is normal  No respiratory distress  Breath sounds: No wheezing or rales  Feet:      Right foot:      Skin integrity: No ulcer, skin breakdown, erythema, warmth, callus or dry skin  Left foot:      Skin integrity: No ulcer, skin breakdown, erythema, warmth, callus or dry skin  Psychiatric:         Behavior: Behavior normal          Thought Content:  Thought content normal          Pertinent Laboratory/Diagnostic Studies:   last labs reviewed    Current Medications     Current Outpatient Medications:     acetaminophen (TYLENOL) 325 mg tablet, Take 650 mg by mouth every 6 (six) hours as needed for mild pain, Disp: , Rfl:     amLODIPine (NORVASC) 2 5 mg tablet, TAKE 1 TABLET BY MOUTH DAILY AT BEDTIME, Disp: 90 tablet, Rfl: 0    aspirin (ECOTRIN) 325 mg EC tablet, Take 1 tablet by mouth, Disp: , Rfl:     cholecalciferol (VITAMIN D3) 1,000 units tablet, Take by mouth, Disp: , Rfl:     finasteride (PROSCAR) 5 mg tablet, Take 1 tablet (5 mg total) by mouth daily, Disp: 90 tablet, Rfl: 0    ketoconazole (NIZORAL) 2 % cream, APPLY TO AFFECTED AREAS EVERY DAY, Disp: , Rfl: 0    nitroglycerin (NITROSTAT) 0 4 mg SL tablet, Place 1 tablet (0 4 mg total) under the tongue every 5 (five) minutes as needed for chest pain, Disp: 5 tablet, Rfl: 2    Omega-3 Fatty Acids (OMEGA 3 PO), Take 2 capsules by mouth daily, Disp: , Rfl:     oxybutynin (DITROPAN-XL) 10 MG 24 hr tablet, Take 1 tablet (10 mg total) by mouth daily at bedtime, Disp: 90 tablet, Rfl: 3    Red Yeast Rice Extract (RED YEAST RICE PO), Take 2 capsules by mouth daily, Disp: , Rfl:     tamsulosin (FLOMAX) 0 4 mg, take 1 capsule by mouth twice a day, Disp: 180 capsule, Rfl: 1    valsartan-hydrochlorothiazide (DIOVAN-HCT) 320-25 MG per tablet, Take 1 tablet by mouth daily at bedtime, Disp: 90 tablet, Rfl: 1    fluticasone (FLONASE) 50 mcg/act nasal spray, 2 sprays into each nostril daily for 30 days, Disp: 1 Bottle, Rfl: 1    Health Maintenance     Health Maintenance   Topic Date Due    SLP PLAN OF CARE  1940    DM Eye Exam  05/25/1952    COVID-19 Vaccine (1 of 2) 05/25/1958    DTaP,Tdap,and Td Vaccines (1 - Tdap) 05/25/1963    Colonoscopy Surveillance  08/31/2010    HEMOGLOBIN A1C  04/26/2019    BMI: Followup Plan  02/19/2021    Diabetic Foot Exam  02/19/2021    Depression Screening PHQ  09/03/2021    Fall Risk  09/03/2021    Medicare Annual Wellness Visit (AWV)  09/03/2021    BMI: Adult  03/04/2022    Pneumococcal Vaccine: 65+ Years  Completed    Influenza Vaccine  Completed    HIB Vaccine  Aged Out    Hepatitis B Vaccine  Aged Out    IPV Vaccine  Aged Out    Hepatitis A Vaccine  Aged Out    Meningococcal ACWY Vaccine  Aged Out    HPV Vaccine  Aged [employer] History   Administered Date(s) Administered    INFLUENZA 09/04/2018, 09/03/2020    Influenza Split High Dose Preservative Free IM 08/30/2017    Pneumococcal Conjugate 13-Valent 03/20/2015    Pneumococcal Polysaccharide PPV23 07/19/2013    Zoster 01/01/2013    Zoster Vaccine Recombinant 12/12/2019, 02/26/2020    influenza, trivalent, adjuvanted 09/30/2019       Denise Salas DO  [facility]

## 2021-03-04 NOTE — ASSESSMENT & PLAN NOTE
History of RCA and circumflex disease  Currently asymptomatic  Continue diet exercise    Continue regular follow-up with his cardiologist

## 2021-03-04 NOTE — ASSESSMENT & PLAN NOTE
Lab Results   Component Value Date    HGBA1C 6 6 (H) 10/26/2018     Last A1c 6 6%  Diet-controlled diabetic at this time  Patient is due for labs    Will call with results

## 2021-03-04 NOTE — ASSESSMENT & PLAN NOTE
Stable on tamsulosin and Proscar  Continue follow-up with Urology  I will order PSA with his routine labs

## 2021-05-30 DIAGNOSIS — N40.1 BENIGN PROSTATIC HYPERPLASIA WITH LOWER URINARY TRACT SYMPTOMS, SYMPTOM DETAILS UNSPECIFIED: ICD-10-CM

## 2021-06-01 RX ORDER — FINASTERIDE 5 MG/1
5 TABLET, FILM COATED ORAL DAILY
Qty: 90 TABLET | Refills: 0 | Status: SHIPPED | OUTPATIENT
Start: 2021-06-01 | End: 2022-01-09

## 2021-06-02 DIAGNOSIS — N40.1 BENIGN PROSTATIC HYPERPLASIA WITH LOWER URINARY TRACT SYMPTOMS, SYMPTOM DETAILS UNSPECIFIED: ICD-10-CM

## 2021-06-02 DIAGNOSIS — I10 BENIGN ESSENTIAL HYPERTENSION: ICD-10-CM

## 2021-06-02 RX ORDER — TAMSULOSIN HYDROCHLORIDE 0.4 MG/1
CAPSULE ORAL
Qty: 180 CAPSULE | Refills: 1 | Status: SHIPPED | OUTPATIENT
Start: 2021-06-02 | End: 2022-07-01 | Stop reason: SDUPTHER

## 2021-06-02 RX ORDER — AMLODIPINE BESYLATE 2.5 MG/1
2.5 TABLET ORAL
Qty: 90 TABLET | Refills: 1 | Status: SHIPPED | OUTPATIENT
Start: 2021-06-02 | End: 2021-10-08 | Stop reason: SDUPTHER

## 2021-06-04 ENCOUNTER — OFFICE VISIT (OUTPATIENT)
Dept: UROLOGY | Facility: CLINIC | Age: 81
End: 2021-06-04
Payer: COMMERCIAL

## 2021-06-04 VITALS
WEIGHT: 201 LBS | BODY MASS INDEX: 31.55 KG/M2 | SYSTOLIC BLOOD PRESSURE: 140 MMHG | HEIGHT: 67 IN | HEART RATE: 61 BPM | DIASTOLIC BLOOD PRESSURE: 80 MMHG

## 2021-06-04 DIAGNOSIS — N40.1 BENIGN PROSTATIC HYPERPLASIA WITH LOWER URINARY TRACT SYMPTOMS, SYMPTOM DETAILS UNSPECIFIED: Primary | ICD-10-CM

## 2021-06-04 DIAGNOSIS — K40.91 UNILATERAL RECURRENT INGUINAL HERNIA WITHOUT OBSTRUCTION OR GANGRENE: ICD-10-CM

## 2021-06-04 PROCEDURE — 3079F DIAST BP 80-89 MM HG: CPT | Performed by: PHYSICIAN ASSISTANT

## 2021-06-04 PROCEDURE — 1160F RVW MEDS BY RX/DR IN RCRD: CPT | Performed by: PHYSICIAN ASSISTANT

## 2021-06-04 PROCEDURE — 1036F TOBACCO NON-USER: CPT | Performed by: PHYSICIAN ASSISTANT

## 2021-06-04 PROCEDURE — 99214 OFFICE O/P EST MOD 30 MIN: CPT | Performed by: PHYSICIAN ASSISTANT

## 2021-06-04 PROCEDURE — 3077F SYST BP >= 140 MM HG: CPT | Performed by: PHYSICIAN ASSISTANT

## 2021-06-04 NOTE — PROGRESS NOTES
6/4/2021      Chief Complaint   Patient presents with    Benign Prostatic Hypertrophy         Assessment and Plan    80 y o  male    1  BPH   Continue Flomax 0 8  Mg q h s , Proscar 5 mg daily, oxybutynin 10 mg daily    Return yearly or prn    History of Present Illness  Cristóbal Nascimento is a 80 y o  male here for evaluation of   One year follow-up BPH with urinary incontinence  He is managed with double-dose Flomax, Proscar, and oxybutynin 10 mg daily  Symptoms are now well controlled  He does not wear pads  He wakes once a night  He has had prior bladder calculi treated with cystolitholapaxy years ago  Later in 2017 he underwent a bladder biopsy with instillation of mitomycin-C due to microscopic hematuria small lesion in the bladder, the pathology was fortunately negative after the fact  He has not had any recurrent hematuria since  He had b/l knee replacements and stays active on a stationary bike daily  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, 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  According to AUA guidelines he does not need additional prostate cancer screening at his age of 80 with stable PSA of 0 6 over the past few years  Review of Systems   Constitutional: Negative for activity change, appetite change, chills, fever and unexpected weight change  HENT: Negative  Respiratory: Negative  Negative for shortness of breath  Cardiovascular: Negative  Negative for chest pain  Gastrointestinal: Negative for abdominal pain, diarrhea, nausea and vomiting  Endocrine: Negative  Genitourinary: Negative for decreased urine volume, difficulty urinating, dysuria, flank pain, frequency, hematuria and urgency  Musculoskeletal: Negative for back pain and gait problem  Skin: Negative  Allergic/Immunologic: Negative  Neurological: Negative  Hematological: Negative for adenopathy  Does not bruise/bleed easily                  Vitals  Vitals:    06/04/21 1118   BP: 140/80   Pulse: 61   Weight: 91 2 kg (201 lb)   Height: 5' 6 93" (1 7 m)       Physical Exam  Vitals signs and nursing note reviewed  Constitutional:       General: He is not in acute distress  Appearance: Normal appearance  He is well-developed  He is not diaphoretic  HENT:      Head: Normocephalic and atraumatic  Pulmonary:      Effort: Pulmonary effort is normal       Comments: No cough or audible wheeze  Abdominal:      General: There is no distension  Tenderness: There is no abdominal tenderness  There is no right CVA tenderness or left CVA tenderness  Musculoskeletal:      Right lower leg: No edema  Left lower leg: No edema  Skin:     General: Skin is warm and dry  Neurological:      Mental Status: He is alert and oriented to person, place, and time  Gait: Gait normal    Psychiatric:         Speech: Speech normal          Behavior: Behavior normal            Past History  Past Medical History:   Diagnosis Date    Abnormal blood chemistry     Last Assessed: 7/19/2013    Abnormal glucose     Last Assessed: 11/6/2015    Arthritis     Bladder tumor     BPH (benign prostatic hypertrophy)     Cataracts, bilateral     "start of"    Coronary artery disease     2 stents  MI x2    Diabetes mellitus ([address] )     Diet controlled pre-diabetic    Erectile dysfunction of non-organic origin     Last Assessed: 5/9/2014    Fatigue     Resolved: 2/23/2015    Glaucoma     "Start of"    Hematuria, microscopic     Last Assessed: 11/7/2016    History of bladder stone     Hyperlipidemia     Hypertension     Knee pain, bilateral     If walking far   No assistive devices    Myocardial infarction [facility]) 2011, 2012    x2    Primary insomnia     Last Assessed; 7/20/2016    Recurrent right inguinal hernia     Last Assessed: 2/23/2015    Urinary incontinence     Urinary urgency     Last Assessed: 10/30/2015    Wears glasses      Social History     Socioeconomic History    Marital status: /Civil Union Spouse name: None    Number of children: None    Years of education: None    Highest education level: None   Occupational History    Occupation: landscaping owner   retired   Social Needs    Financial resource strain: None    Food insecurity     Worry: None     Inability: None    Transportation needs     Medical: None     Non-medical: None   Tobacco Use    Smoking status: Former Smoker     Types: Cigarettes, Pipe    Smokeless tobacco: Never Used    Tobacco comment: Quit 50 years ago and only smoked for 1 year   Substance and Sexual Activity    Alcohol use:  Yes     Alcohol/week: 2 0 standard drinks     Types: 2 Shots of liquor per week     Comment: 2 shots of liquor weekly    Drug use: No    Sexual activity: None   Lifestyle    Physical activity     Days per week: None     Minutes per session: None    Stress: None   Relationships    Social connections     Talks on phone: None     Gets together: None     Attends Buddhism service: None     Active member of club or organization: None     Attends meetings of clubs or organizations: None     Relationship status: None    Intimate partner violence     Fear of current or ex partner: None     Emotionally abused: None     Physically abused: None     Forced sexual activity: None   Other Topics Concern    None   Social History Narrative    Always uses seat belt    Daily caffeine consumption, 2-3 servings a day    Feels safe at home     Social History     Tobacco Use   Smoking Status Former Smoker    Types: Cigarettes, Pipe   Smokeless Tobacco Never Used   Tobacco Comment    Quit 50 years ago and only smoked for 1 year     Family History   Problem Relation Age of Onset    No Known Problems Mother         Diabetes per [employer]    No Known Problems Father     Skin cancer Brother     Diabetes Brother     Bipolar disorder Other     Diabetes Family     Hypertension Family     Substance Abuse Neg Hx     Alcohol abuse Neg Hx        The following portions of the patient's history were reviewed and updated as appropriate: allergies, current medications, past medical history, past social history, past surgical history and problem list     Results  No results found for this or any previous visit (from the past 1 hour(s)) ]  Lab Results   Component Value Date    PSA 0 6 03/12/2018     Lab Results   Component Value Date    GLUCOSE 117 11/05/2015    CALCIUM 10 1 10/26/2018     07/31/2017    K 4 1 10/26/2018    CO2 27 10/26/2018     10/26/2018    BUN 22 10/26/2018    CREATININE 1 29 10/26/2018     Lab Results   Component Value Date    WBC 5 1 03/12/2018    HGB 17 1 03/12/2018    HCT 49 6 03/12/2018    MCV 92 4 03/12/2018     03/12/2018

## 2021-07-07 ENCOUNTER — CONSULT (OUTPATIENT)
Dept: SURGERY | Facility: CLINIC | Age: 81
End: 2021-07-07
Payer: COMMERCIAL

## 2021-07-07 VITALS
RESPIRATION RATE: 16 BRPM | HEART RATE: 60 BPM | HEIGHT: 67 IN | BODY MASS INDEX: 31.08 KG/M2 | DIASTOLIC BLOOD PRESSURE: 78 MMHG | SYSTOLIC BLOOD PRESSURE: 124 MMHG | TEMPERATURE: 97.8 F | WEIGHT: 198 LBS

## 2021-07-07 DIAGNOSIS — K40.91 UNILATERAL RECURRENT INGUINAL HERNIA WITHOUT OBSTRUCTION OR GANGRENE: ICD-10-CM

## 2021-07-07 PROBLEM — R10.31 RIGHT GROIN PAIN: Status: ACTIVE | Noted: 2021-07-07

## 2021-07-07 PROCEDURE — 3074F SYST BP LT 130 MM HG: CPT | Performed by: SURGERY

## 2021-07-07 PROCEDURE — 1036F TOBACCO NON-USER: CPT | Performed by: SURGERY

## 2021-07-07 PROCEDURE — 99203 OFFICE O/P NEW LOW 30 MIN: CPT | Performed by: SURGERY

## 2021-07-07 PROCEDURE — 3078F DIAST BP <80 MM HG: CPT | Performed by: SURGERY

## 2021-07-07 NOTE — PROGRESS NOTES
Assessment/Plan:    Right groin pain   I do not appreciate a recurrent inguinal hernia in the right groin  His discomfort might have been a groin strain or some inflammation from some heavy lifting  I recommend using ibuprofen 4 times a day for the next 3-4 days to help decrease the inflammation  He can use heating pad at night  I told him if his symptoms persist or worsen he should call for re-evaluation  However I suspect they will resolve with time and he can follow up p r n  Diagnoses and all orders for this visit:    Unilateral recurrent inguinal hernia without obstruction or gangrene  -     Ambulatory referral to General Surgery          Subjective:      Patient ID: Stephany Lambert is a 80 y o  male  Mr Phil Haque   Is a 68-year-old gentleman is here for evaluation of right groin pain  He has a history of a laparoscopic repair of a recurrent right inguinal hernia in 2015 by me  He states about 2 weeks ago he was lifting something and felt some pain in his right groin  He states he took Tylenol for 2 days and started to improve  He still has some discomfort there  He notices it more when he is straining or lifting  He denies nausea vomiting fevers or chills      The following portions of the patient's history were reviewed and updated as appropriate: allergies, current medications, past family history, past medical history, past social history, past surgical history and problem list     Review of Systems   Constitutional: Negative for chills and fever  HENT: Negative for ear pain and sore throat  Eyes: Negative for pain and visual disturbance  Respiratory: Negative for cough and shortness of breath  Cardiovascular: Negative for chest pain and palpitations  Gastrointestinal: Positive for abdominal pain  Negative for vomiting  Musculoskeletal: Positive for arthralgias  Negative for back pain  Skin: Negative for color change and rash     Neurological: Negative for seizures and syncope  Psychiatric/Behavioral: Negative for agitation and behavioral problems  All other systems reviewed and are negative  Objective:      /78   Pulse 60   Temp 97 8 °F (36 6 °C)   Resp 16   Ht 5' 7" (1 702 m)   Wt 89 8 kg (198 lb)   BMI 31 01 kg/m²          Physical Exam  Vitals and nursing note reviewed  Constitutional:       General: He is not in acute distress  Appearance: He is well-developed  He is not diaphoretic  HENT:      Head: Normocephalic and atraumatic  Eyes:      Pupils: Pupils are equal, round, and reactive to light  Cardiovascular:      Rate and Rhythm: Normal rate and regular rhythm  Heart sounds: Normal heart sounds  No murmur heard  Pulmonary:      Effort: Pulmonary effort is normal  No respiratory distress  Breath sounds: Normal breath sounds  No wheezing  Abdominal:      General: Bowel sounds are normal       Palpations: Abdomen is soft  Comments:  Mild tenderness right groin at 2 tubercle  No evidence of recurrence on right or left side   Musculoskeletal:      Cervical back: Normal range of motion and neck supple  Comments:  Multiple scars from joint surgery   Skin:     General: Skin is warm and dry  Neurological:      Mental Status: He is alert and oriented to person, place, and time     Psychiatric:         Behavior: Behavior normal

## 2021-07-07 NOTE — ASSESSMENT & PLAN NOTE
I do not appreciate a recurrent inguinal hernia in the right groin  His discomfort might have been a groin strain or some inflammation from some heavy lifting  I recommend using ibuprofen 4 times a day for the next 3-4 days to help decrease the inflammation  He can use heating pad at night  I told him if his symptoms persist or worsen he should call for re-evaluation  However I suspect they will resolve with time and he can follow up p r n

## 2021-07-26 DIAGNOSIS — R07.9 CHEST PAIN, UNSPECIFIED TYPE: ICD-10-CM

## 2021-07-26 RX ORDER — NITROGLYCERIN 0.4 MG/1
0.4 TABLET SUBLINGUAL
Qty: 5 TABLET | Refills: 2 | Status: SHIPPED | OUTPATIENT
Start: 2021-07-26

## 2021-08-31 DIAGNOSIS — I10 BENIGN ESSENTIAL HYPERTENSION: ICD-10-CM

## 2021-08-31 RX ORDER — VALSARTAN AND HYDROCHLOROTHIAZIDE 320; 25 MG/1; MG/1
1 TABLET, FILM COATED ORAL
Qty: 90 TABLET | Refills: 1 | Status: SHIPPED | OUTPATIENT
Start: 2021-08-31 | End: 2022-03-15 | Stop reason: SDUPTHER

## 2021-09-08 ENCOUNTER — RA CDI HCC (OUTPATIENT)
Dept: OTHER | Facility: HOSPITAL | Age: 81
End: 2021-09-08

## 2021-09-08 NOTE — PROGRESS NOTES
[facility]  coding opportunities       Chart reviewed, no opportunity found: CHART REVIEWED, NO OPPORTUNITY FOUND                        Patients insurance company: [employer] (Medicare Advantage and Commercial)

## 2021-10-08 ENCOUNTER — OFFICE VISIT (OUTPATIENT)
Dept: FAMILY MEDICINE CLINIC | Facility: CLINIC | Age: 81
End: 2021-10-08
Payer: COMMERCIAL

## 2021-10-08 VITALS
TEMPERATURE: 98 F | DIASTOLIC BLOOD PRESSURE: 80 MMHG | SYSTOLIC BLOOD PRESSURE: 118 MMHG | HEIGHT: 67 IN | OXYGEN SATURATION: 99 % | BODY MASS INDEX: 30.86 KG/M2 | RESPIRATION RATE: 16 BRPM | HEART RATE: 68 BPM | WEIGHT: 196.6 LBS

## 2021-10-08 DIAGNOSIS — Z00.00 MEDICARE ANNUAL WELLNESS VISIT, SUBSEQUENT: ICD-10-CM

## 2021-10-08 DIAGNOSIS — E78.2 MIXED HYPERLIPIDEMIA: ICD-10-CM

## 2021-10-08 DIAGNOSIS — I25.10 ARTERIOSCLEROSIS OF CORONARY ARTERY: ICD-10-CM

## 2021-10-08 DIAGNOSIS — I10 BENIGN ESSENTIAL HYPERTENSION: ICD-10-CM

## 2021-10-08 DIAGNOSIS — E11.9 TYPE 2 DIABETES MELLITUS WITHOUT COMPLICATION, UNSPECIFIED WHETHER LONG TERM INSULIN USE (HCC): ICD-10-CM

## 2021-10-08 DIAGNOSIS — Z23 NEED FOR IMMUNIZATION AGAINST INFLUENZA: Primary | ICD-10-CM

## 2021-10-08 DIAGNOSIS — Z12.11 SCREENING FOR COLON CANCER: ICD-10-CM

## 2021-10-08 DIAGNOSIS — N40.1 BENIGN PROSTATIC HYPERPLASIA WITH LOWER URINARY TRACT SYMPTOMS, SYMPTOM DETAILS UNSPECIFIED: ICD-10-CM

## 2021-10-08 PROCEDURE — G0439 PPPS, SUBSEQ VISIT: HCPCS | Performed by: FAMILY MEDICINE

## 2021-10-08 PROCEDURE — 3079F DIAST BP 80-89 MM HG: CPT | Performed by: FAMILY MEDICINE

## 2021-10-08 PROCEDURE — 99214 OFFICE O/P EST MOD 30 MIN: CPT | Performed by: FAMILY MEDICINE

## 2021-10-08 PROCEDURE — G0008 ADMIN INFLUENZA VIRUS VAC: HCPCS | Performed by: FAMILY MEDICINE

## 2021-10-08 PROCEDURE — 3074F SYST BP LT 130 MM HG: CPT | Performed by: FAMILY MEDICINE

## 2021-10-08 PROCEDURE — 3288F FALL RISK ASSESSMENT DOCD: CPT | Performed by: FAMILY MEDICINE

## 2021-10-08 PROCEDURE — 1170F FXNL STATUS ASSESSED: CPT | Performed by: FAMILY MEDICINE

## 2021-10-08 PROCEDURE — 1101F PT FALLS ASSESS-DOCD LE1/YR: CPT | Performed by: FAMILY MEDICINE

## 2021-10-08 PROCEDURE — 1125F AMNT PAIN NOTED PAIN PRSNT: CPT | Performed by: FAMILY MEDICINE

## 2021-10-08 PROCEDURE — 1036F TOBACCO NON-USER: CPT | Performed by: FAMILY MEDICINE

## 2021-10-08 PROCEDURE — 1160F RVW MEDS BY RX/DR IN RCRD: CPT | Performed by: FAMILY MEDICINE

## 2021-10-08 PROCEDURE — 3725F SCREEN DEPRESSION PERFORMED: CPT | Performed by: FAMILY MEDICINE

## 2021-10-08 PROCEDURE — 90662 IIV NO PRSV INCREASED AG IM: CPT | Performed by: FAMILY MEDICINE

## 2021-10-08 RX ORDER — AMLODIPINE BESYLATE 2.5 MG/1
2.5 TABLET ORAL
Qty: 90 TABLET | Refills: 1 | Status: SHIPPED | OUTPATIENT
Start: 2021-10-08 | End: 2022-04-04 | Stop reason: SDUPTHER

## 2021-12-23 NOTE — TELEPHONE ENCOUNTER
Pt called with questions regarding the Shingrix vaccination  What Type Of Note Output Would You Prefer (Optional)?: Standard Output How Severe Is Your Rash?: mild Is This A New Presentation, Or A Follow-Up?: Rash

## 2022-01-08 DIAGNOSIS — N40.1 BENIGN PROSTATIC HYPERPLASIA WITH LOWER URINARY TRACT SYMPTOMS, SYMPTOM DETAILS UNSPECIFIED: ICD-10-CM

## 2022-01-09 RX ORDER — FINASTERIDE 5 MG/1
TABLET, FILM COATED ORAL
Qty: 90 TABLET | Refills: 0 | Status: SHIPPED | OUTPATIENT
Start: 2022-01-09 | End: 2022-07-01

## 2022-01-27 ENCOUNTER — TELEPHONE (OUTPATIENT)
Dept: FAMILY MEDICINE CLINIC | Facility: CLINIC | Age: 82
End: 2022-01-27

## 2022-01-27 DIAGNOSIS — I10 PRIMARY HYPERTENSION: ICD-10-CM

## 2022-01-27 DIAGNOSIS — E78.2 MIXED HYPERLIPIDEMIA: ICD-10-CM

## 2022-01-27 DIAGNOSIS — E11.9 TYPE 2 DIABETES MELLITUS WITHOUT COMPLICATION, UNSPECIFIED WHETHER LONG TERM INSULIN USE (HCC): Primary | ICD-10-CM

## 2022-01-27 NOTE — TELEPHONE ENCOUNTER
Pt was in with his wife for her appt and they asked to check when he last got blood work  I'm not seeing anything for a few years  Would you like him to get some for his up coming appt on 4/13/21?     Please call when order(s) are placed [phone]

## 2022-03-15 DIAGNOSIS — I10 BENIGN ESSENTIAL HYPERTENSION: ICD-10-CM

## 2022-03-15 RX ORDER — VALSARTAN AND HYDROCHLOROTHIAZIDE 320; 25 MG/1; MG/1
1 TABLET, FILM COATED ORAL
Qty: 90 TABLET | Refills: 0 | Status: SHIPPED | OUTPATIENT
Start: 2022-03-15

## 2022-04-04 DIAGNOSIS — I10 BENIGN ESSENTIAL HYPERTENSION: ICD-10-CM

## 2022-04-04 RX ORDER — AMLODIPINE BESYLATE 2.5 MG/1
2.5 TABLET ORAL
Qty: 90 TABLET | Refills: 1 | Status: SHIPPED | OUTPATIENT
Start: 2022-04-04

## 2022-04-06 ENCOUNTER — RA CDI HCC (OUTPATIENT)
Dept: OTHER | Facility: HOSPITAL | Age: 82
End: 2022-04-06

## 2022-04-06 NOTE — PROGRESS NOTES
[facility]  coding opportunities          Chart Reviewed number of suggestions sent to Provider: 1  [id number]       Patients Insurance     Medicare Insurance: [employer] Advantage

## 2022-04-13 ENCOUNTER — OFFICE VISIT (OUTPATIENT)
Dept: FAMILY MEDICINE CLINIC | Facility: CLINIC | Age: 82
End: 2022-04-13
Payer: COMMERCIAL

## 2022-04-13 ENCOUNTER — APPOINTMENT (OUTPATIENT)
Dept: LAB | Facility: CLINIC | Age: 82
End: 2022-04-13
Payer: COMMERCIAL

## 2022-04-13 VITALS
DIASTOLIC BLOOD PRESSURE: 70 MMHG | WEIGHT: 189 LBS | RESPIRATION RATE: 14 BRPM | SYSTOLIC BLOOD PRESSURE: 120 MMHG | HEIGHT: 67 IN | BODY MASS INDEX: 29.66 KG/M2 | OXYGEN SATURATION: 96 % | HEART RATE: 50 BPM | TEMPERATURE: 97.8 F

## 2022-04-13 DIAGNOSIS — E11.22 TYPE 2 DIABETES MELLITUS WITH CHRONIC KIDNEY DISEASE, WITHOUT LONG-TERM CURRENT USE OF INSULIN, UNSPECIFIED CKD STAGE (HCC): ICD-10-CM

## 2022-04-13 DIAGNOSIS — R41.3 MEMORY PROBLEM: ICD-10-CM

## 2022-04-13 DIAGNOSIS — I25.10 ARTERIOSCLEROSIS OF CORONARY ARTERY: ICD-10-CM

## 2022-04-13 DIAGNOSIS — R32 URINARY INCONTINENCE, UNSPECIFIED TYPE: ICD-10-CM

## 2022-04-13 DIAGNOSIS — N40.1 BENIGN PROSTATIC HYPERPLASIA WITH LOWER URINARY TRACT SYMPTOMS, SYMPTOM DETAILS UNSPECIFIED: ICD-10-CM

## 2022-04-13 DIAGNOSIS — Z12.5 SCREENING FOR PROSTATE CANCER: Primary | ICD-10-CM

## 2022-04-13 DIAGNOSIS — I10 PRIMARY HYPERTENSION: ICD-10-CM

## 2022-04-13 DIAGNOSIS — E78.2 MIXED HYPERLIPIDEMIA: ICD-10-CM

## 2022-04-13 DIAGNOSIS — M16.11 OSTEOARTHRITIS OF RIGHT HIP, UNSPECIFIED OSTEOARTHRITIS TYPE: ICD-10-CM

## 2022-04-13 DIAGNOSIS — E66.3 OVERWEIGHT (BMI 25.0-29.9): ICD-10-CM

## 2022-04-13 DIAGNOSIS — E11.9 TYPE 2 DIABETES MELLITUS WITHOUT COMPLICATION, UNSPECIFIED WHETHER LONG TERM INSULIN USE (HCC): ICD-10-CM

## 2022-04-13 DIAGNOSIS — Z12.5 SCREENING FOR PROSTATE CANCER: ICD-10-CM

## 2022-04-13 LAB
ALBUMIN SERPL BCP-MCNC: 3.7 G/DL (ref 3.5–5)
ALP SERPL-CCNC: 41 U/L (ref 46–116)
ALT SERPL W P-5'-P-CCNC: 16 U/L (ref 12–78)
ANION GAP SERPL CALCULATED.3IONS-SCNC: 5 MMOL/L (ref 4–13)
AST SERPL W P-5'-P-CCNC: 9 U/L (ref 5–45)
BASOPHILS # BLD AUTO: 0.04 THOUSANDS/ΜL (ref 0–0.1)
BASOPHILS NFR BLD AUTO: 1 % (ref 0–1)
BILIRUB SERPL-MCNC: 0.65 MG/DL (ref 0.2–1)
BUN SERPL-MCNC: 32 MG/DL (ref 5–25)
CALCIUM SERPL-MCNC: 11.1 MG/DL (ref 8.3–10.1)
CHLORIDE SERPL-SCNC: 107 MMOL/L (ref 100–108)
CHOLEST SERPL-MCNC: 175 MG/DL
CO2 SERPL-SCNC: 26 MMOL/L (ref 21–32)
CREAT SERPL-MCNC: 1.58 MG/DL (ref 0.6–1.3)
EOSINOPHIL # BLD AUTO: 0.15 THOUSAND/ΜL (ref 0–0.61)
EOSINOPHIL NFR BLD AUTO: 2 % (ref 0–6)
ERYTHROCYTE [DISTWIDTH] IN BLOOD BY AUTOMATED COUNT: 12.7 % (ref 11.6–15.1)
EST. AVERAGE GLUCOSE BLD GHB EST-MCNC: 134 MG/DL
GFR SERPL CREATININE-BSD FRML MDRD: 40 ML/MIN/1.73SQ M
GLUCOSE P FAST SERPL-MCNC: 139 MG/DL (ref 65–99)
HBA1C MFR BLD: 6.3 %
HCT VFR BLD AUTO: 45.9 % (ref 36.5–49.3)
HDLC SERPL-MCNC: 46 MG/DL
HGB BLD-MCNC: 15.5 G/DL (ref 12–17)
IMM GRANULOCYTES # BLD AUTO: 0.02 THOUSAND/UL (ref 0–0.2)
IMM GRANULOCYTES NFR BLD AUTO: 0 % (ref 0–2)
LDLC SERPL CALC-MCNC: 109 MG/DL (ref 0–100)
LYMPHOCYTES # BLD AUTO: 1.2 THOUSANDS/ΜL (ref 0.6–4.47)
LYMPHOCYTES NFR BLD AUTO: 20 % (ref 14–44)
MCH RBC QN AUTO: 31.6 PG (ref 26.8–34.3)
MCHC RBC AUTO-ENTMCNC: 33.8 G/DL (ref 31.4–37.4)
MCV RBC AUTO: 94 FL (ref 82–98)
MONOCYTES # BLD AUTO: 0.75 THOUSAND/ΜL (ref 0.17–1.22)
MONOCYTES NFR BLD AUTO: 12 % (ref 4–12)
NEUTROPHILS # BLD AUTO: 3.97 THOUSANDS/ΜL (ref 1.85–7.62)
NEUTS SEG NFR BLD AUTO: 65 % (ref 43–75)
NRBC BLD AUTO-RTO: 0 /100 WBCS
PLATELET # BLD AUTO: 213 THOUSANDS/UL (ref 149–390)
PMV BLD AUTO: 10.4 FL (ref 8.9–12.7)
POTASSIUM SERPL-SCNC: 3.9 MMOL/L (ref 3.5–5.3)
PROT SERPL-MCNC: 6.9 G/DL (ref 6.4–8.2)
PSA SERPL-MCNC: 0.8 NG/ML (ref 0–4)
RBC # BLD AUTO: 4.91 MILLION/UL (ref 3.88–5.62)
SODIUM SERPL-SCNC: 138 MMOL/L (ref 136–145)
TRIGL SERPL-MCNC: 99 MG/DL
TSH SERPL DL<=0.05 MIU/L-ACNC: 1.31 UIU/ML (ref 0.45–4.5)
WBC # BLD AUTO: 6.13 THOUSAND/UL (ref 4.31–10.16)

## 2022-04-13 PROCEDURE — 1036F TOBACCO NON-USER: CPT | Performed by: FAMILY MEDICINE

## 2022-04-13 PROCEDURE — 85025 COMPLETE CBC W/AUTO DIFF WBC: CPT

## 2022-04-13 PROCEDURE — 83036 HEMOGLOBIN GLYCOSYLATED A1C: CPT

## 2022-04-13 PROCEDURE — 80061 LIPID PANEL: CPT

## 2022-04-13 PROCEDURE — 36415 COLL VENOUS BLD VENIPUNCTURE: CPT

## 2022-04-13 PROCEDURE — 3078F DIAST BP <80 MM HG: CPT | Performed by: FAMILY MEDICINE

## 2022-04-13 PROCEDURE — 84443 ASSAY THYROID STIM HORMONE: CPT

## 2022-04-13 PROCEDURE — 1160F RVW MEDS BY RX/DR IN RCRD: CPT | Performed by: FAMILY MEDICINE

## 2022-04-13 PROCEDURE — G0103 PSA SCREENING: HCPCS

## 2022-04-13 PROCEDURE — 99214 OFFICE O/P EST MOD 30 MIN: CPT | Performed by: FAMILY MEDICINE

## 2022-04-13 PROCEDURE — 3074F SYST BP LT 130 MM HG: CPT | Performed by: FAMILY MEDICINE

## 2022-04-13 PROCEDURE — 80053 COMPREHEN METABOLIC PANEL: CPT

## 2022-04-13 NOTE — ASSESSMENT & PLAN NOTE
Status post right THR proximally 5 years ago by Dr Lubna Champagne  Patient has been having more pain lately    I encouraged him to follow-up with ortho

## 2022-04-13 NOTE — ASSESSMENT & PLAN NOTE
Weight today 189, BMI 29 66  Patient has been carefully watching his diet and exercising (more during the warmer months)  He has lost 14 lb in the last year (intentionally)  Continue diet exercise    Will continue to monitor

## 2022-04-13 NOTE — PROGRESS NOTES
[facility]      NAME: Elissa Walters  AGE: 80 y o  SEX: male  : 1940   MRN: [id number]    DATE: 2022  TIME: 8:39 AM    Assessment and Plan     Problem List Items Addressed This Visit     Arteriosclerosis of coronary artery     History of RCA and circumflex disease  Patient denies any chest pain or shortness of breath  Continue diet exercise  Continue follow-up with cardiology as directed         Benign prostatic hyperplasia     Symptoms are stable on tamsulosin and finasteride  Continue follow-up with Urology as directed  Patient is requesting PSA level (last is )  Order placed today         Type 2 diabetes mellitus with chronic kidney disease, without long-term current use of insulin, unspecified CKD stage ([facility] [address] )       Lab Results   Component Value Date    HGBA1C 6 6 (H) 10/26/2018   Last A1c 6 6%  Patient has been carefully watching his diet lately  In exercising (more during the warmer months)  He has lost approximately 14 lb in the last year (intentionally)  Will recheck labs today  Hyperlipidemia     Continue red yeast rice and Omega 3  Continue low-fat diet exercise  Recheck labs today         Osteoarthritis of right hip     Status post right THR proximally 5 years ago by Dr Kinsey Ackerman  Patient has been having more pain lately  I encouraged him to follow-up with ortho         Memory problem     Patient still complains of mild memory issues  Will refer to [facility] for assessment         Relevant Orders    Ambulatory Referral to [facility]    Hypertension     Well controlled on amlodipine 2 5 and valsartan /25         Urinary incontinence     Stable on oxybutynin 10 mg HS         Overweight (BMI 25 0-29  9)     Weight today 189, BMI 29 66  Patient has been carefully watching his diet and exercising (more during the warmer months)  He has lost 14 lb in the last year (intentionally)  Continue diet exercise    Will continue to monitor Other Visit Diagnoses     Screening for prostate cancer    -  Primary    Relevant Orders    PSA, Total Screen          Patient is due for fasting blood work, he will get this drawn today  Will call with results    Return to office in: 6 mos (?  Labs)    Chief Complaint     Chief Complaint   Patient presents with    Follow-up     6 months       History of Present Illness     Patient presents recheck chronic medical problems  He is compliant prescribed medications  Still being followed by Cardiology Urology  The following portions of the patient's history were reviewed and updated as appropriate: allergies, current medications, past family history, past medical history, past social history, past surgical history and problem list     Review of Systems   Review of Systems   Respiratory: Negative  Cardiovascular: Negative  Gastrointestinal: Negative  Genitourinary: Negative  Active Problem List     Patient Active Problem List   Diagnosis    Arteriosclerosis of coronary artery    Benign essential hypertension    Benign prostatic hyperplasia    Type 2 diabetes mellitus with chronic kidney disease, without long-term current use of insulin, unspecified CKD stage (HCC)    Hyperlipidemia    Osteoarthritis of right hip    Primary insomnia    Vitamin D deficiency    Allergic rhinitis    Memory problem    Bladder calculus    Symptomatic bradycardia    Hypertension    Chronic nasal congestion    Urinary incontinence    Right groin pain    Overweight (BMI 25 0-29  9)       Objective   /70 (BP Location: Left arm, Patient Position: Sitting, Cuff Size: Adult)   Pulse (!) 50   Temp 97 8 °F (36 6 °C) (Temporal)   Resp 14   Ht 5' 6 93" (1 7 m)   Wt 85 7 kg (189 lb)   SpO2 96%   BMI 29 66 kg/m²     Physical Exam  Cardiovascular:      Rate and Rhythm: Normal rate and regular rhythm  Heart sounds: Normal heart sounds        Comments: Carotids: no bruits  Ext: no edema  Pulmonary: Effort: Pulmonary effort is normal  No respiratory distress  Breath sounds: No wheezing or rales  Psychiatric:         Behavior: Behavior normal          Thought Content:  Thought content normal          Pertinent Laboratory/Diagnostic Studies:  Last labs reviewed    Current Medications     Current Outpatient Medications:     acetaminophen (TYLENOL) 325 mg tablet, Take 650 mg by mouth every 6 (six) hours as needed for mild pain, Disp: , Rfl:     amLODIPine (NORVASC) 2 5 mg tablet, Take 1 tablet (2 5 mg total) by mouth daily at bedtime, Disp: 90 tablet, Rfl: 1    aspirin (ECOTRIN) 325 mg EC tablet, Take 1 tablet by mouth, Disp: , Rfl:     cholecalciferol (VITAMIN D3) 1,000 units tablet, Take by mouth, Disp: , Rfl:     finasteride (PROSCAR) 5 mg tablet, take 1 tablet by mouth once daily, Disp: 90 tablet, Rfl: 0    ketoconazole (NIZORAL) 2 % cream, APPLY TO AFFECTED AREAS EVERY DAY, Disp: , Rfl: 0    nitroglycerin (NITROSTAT) 0 4 mg SL tablet, Place 1 tablet (0 4 mg total) under the tongue every 5 (five) minutes as needed for chest pain, Disp: 5 tablet, Rfl: 2    Omega-3 Fatty Acids (OMEGA 3 PO), Take 2 capsules by mouth daily, Disp: , Rfl:     oxybutynin (DITROPAN-XL) 10 MG 24 hr tablet, Take 1 tablet (10 mg total) by mouth daily at bedtime, Disp: 90 tablet, Rfl: 3    Red Yeast Rice Extract (RED YEAST RICE PO), Take 2 capsules by mouth daily, Disp: , Rfl:     tamsulosin (FLOMAX) 0 4 mg, take 1 capsule by mouth twice a day, Disp: 180 capsule, Rfl: 1    valsartan-hydrochlorothiazide (DIOVAN-HCT) 320-25 MG per tablet, Take 1 tablet by mouth daily at bedtime, Disp: 90 tablet, Rfl: 0    fluticasone (FLONASE) 50 mcg/act nasal spray, 2 sprays into each nostril daily for 30 days, Disp: 1 Bottle, Rfl: 1    Health Maintenance     Health Maintenance   Topic Date Due    Hepatitis C Screening  Never done    SLP PLAN OF CARE  Never done    DM Eye Exam  Never done    DTaP,Tdap,and Td Vaccines (1 - Tdap) Never done    HEMOGLOBIN A1C  04/26/2019    Colorectal Cancer Screening  11/30/2020    Diabetic Foot Exam  03/04/2022    Depression Screening  10/08/2022    BMI: Followup Plan  10/10/2022    Fall Risk  10/08/2022    Medicare Annual Wellness Visit (AWV)  10/08/2022    BMI: Adult  04/13/2023    Pneumococcal Vaccine: 65+ Years  Completed    Influenza Vaccine  Completed    COVID-19 Vaccine  Completed    HIB Vaccine  Aged Out    Hepatitis B Vaccine  Aged Out    IPV Vaccine  Aged Out    Hepatitis A Vaccine  Aged Out    Meningococcal ACWY Vaccine  Aged Out    HPV Vaccine  Aged Out     Immunization History   Administered Date(s) Administered    COVID-19 MODERNA VACC 0 5 ML IM 03/24/2021, 04/23/2021, 12/22/2021    INFLUENZA 09/04/2018, 09/03/2020    Influenza Split High Dose Preservative Free IM 08/30/2017    Influenza, high dose seasonal 0 7 mL 10/08/2021    Pneumococcal Conjugate 13-Valent 03/20/2015    Pneumococcal Polysaccharide PPV23 07/19/2013    Zoster 01/01/2013    Zoster Vaccine Recombinant 12/12/2019, 02/26/2020    influenza, trivalent, adjuvanted 09/30/2019       [facility]

## 2022-04-13 NOTE — ASSESSMENT & PLAN NOTE
Lab Results   Component Value Date    HGBA1C 6 6 (H) 10/26/2018   Last A1c 6 6%  Patient has been carefully watching his diet lately  In exercising (more during the warmer months)  He has lost approximately 14 lb in the last year (intentionally)  Will recheck labs today

## 2022-04-13 NOTE — ASSESSMENT & PLAN NOTE
Symptoms are stable on tamsulosin and finasteride  Continue follow-up with Urology as directed  Patient is requesting PSA level (last is 2018)    Order placed today

## 2022-04-13 NOTE — ASSESSMENT & PLAN NOTE
History of RCA and circumflex disease  Patient denies any chest pain or shortness of breath  Continue diet exercise    Continue follow-up with cardiology as directed

## 2022-04-14 ENCOUNTER — TELEPHONE (OUTPATIENT)
Dept: FAMILY MEDICINE CLINIC | Facility: CLINIC | Age: 82
End: 2022-04-14

## 2022-04-14 DIAGNOSIS — E11.22 TYPE 2 DIABETES MELLITUS WITH CHRONIC KIDNEY DISEASE, WITHOUT LONG-TERM CURRENT USE OF INSULIN, UNSPECIFIED CKD STAGE (HCC): Primary | ICD-10-CM

## 2022-04-14 DIAGNOSIS — E78.2 MIXED HYPERLIPIDEMIA: ICD-10-CM

## 2022-04-14 NOTE — TELEPHONE ENCOUNTER
----- Message from Francois Fuller DO sent at 4/14/2022  8:40 AM EDT -----  Please call patient  I reviewed his lab results  His kidney function has declined a little bit  This can be normal due to age and diabetes, but we will continue to follow closely  Remainder of his labs looked good  Diabetic control is excellent at 6 3%  Cholesterol 175, normal thyroid, normal prostate  Recommend recheck labs prior to next appointment in October (I placed lab orders at [facility] lab to be done prior to visit)

## 2022-04-14 NOTE — TELEPHONE ENCOUNTER
----- Message from Hilario Love DO sent at 4/14/2022  8:40 AM EDT -----  Please call patient  I reviewed his lab results  His kidney function has declined a little bit  This can be normal due to age and diabetes, but we will continue to follow closely  Remainder of his labs looked good  Diabetic control is excellent at 6 3%  Cholesterol 175, normal thyroid, normal prostate  Recommend recheck labs prior to next appointment in October ([facility] visit)

## 2022-06-30 DIAGNOSIS — N40.1 BENIGN PROSTATIC HYPERPLASIA WITH LOWER URINARY TRACT SYMPTOMS, SYMPTOM DETAILS UNSPECIFIED: ICD-10-CM

## 2022-07-01 DIAGNOSIS — N40.1 BENIGN PROSTATIC HYPERPLASIA WITH LOWER URINARY TRACT SYMPTOMS, SYMPTOM DETAILS UNSPECIFIED: ICD-10-CM

## 2022-07-01 RX ORDER — TAMSULOSIN HYDROCHLORIDE 0.4 MG/1
0.4 CAPSULE ORAL 2 TIMES DAILY
Qty: 180 CAPSULE | Refills: 1 | Status: SHIPPED | OUTPATIENT
Start: 2022-07-01

## 2022-07-01 RX ORDER — FINASTERIDE 5 MG/1
TABLET, FILM COATED ORAL
Qty: 90 TABLET | Refills: 0 | Status: SHIPPED | OUTPATIENT
Start: 2022-07-01 | End: 2022-09-25

## 2022-07-28 ENCOUNTER — OFFICE VISIT (OUTPATIENT)
Dept: UROLOGY | Facility: CLINIC | Age: 82
End: 2022-07-28
Payer: COMMERCIAL

## 2022-07-28 VITALS
HEIGHT: 66 IN | BODY MASS INDEX: 29.25 KG/M2 | SYSTOLIC BLOOD PRESSURE: 120 MMHG | DIASTOLIC BLOOD PRESSURE: 82 MMHG | WEIGHT: 182 LBS | HEART RATE: 60 BPM

## 2022-07-28 DIAGNOSIS — N40.1 BENIGN PROSTATIC HYPERPLASIA WITH LOWER URINARY TRACT SYMPTOMS, SYMPTOM DETAILS UNSPECIFIED: Primary | ICD-10-CM

## 2022-07-28 LAB
POST-VOID RESIDUAL VOLUME, ML POC: 89 ML
SL AMB  POCT GLUCOSE, UA: NORMAL
SL AMB LEUKOCYTE ESTERASE,UA: NORMAL
SL AMB POCT BILIRUBIN,UA: NORMAL
SL AMB POCT BLOOD,UA: NORMAL
SL AMB POCT CLARITY,UA: CLEAR
SL AMB POCT COLOR,UA: YELLOW
SL AMB POCT KETONES,UA: NORMAL
SL AMB POCT NITRITE,UA: NORMAL
SL AMB POCT PH,UA: 5
SL AMB POCT SPECIFIC GRAVITY,UA: 1.01
SL AMB POCT URINE PROTEIN: NORMAL
SL AMB POCT UROBILINOGEN: 0.2

## 2022-07-28 PROCEDURE — 51798 US URINE CAPACITY MEASURE: CPT | Performed by: PHYSICIAN ASSISTANT

## 2022-07-28 PROCEDURE — 1160F RVW MEDS BY RX/DR IN RCRD: CPT | Performed by: PHYSICIAN ASSISTANT

## 2022-07-28 PROCEDURE — 99213 OFFICE O/P EST LOW 20 MIN: CPT | Performed by: PHYSICIAN ASSISTANT

## 2022-07-28 PROCEDURE — 3074F SYST BP LT 130 MM HG: CPT | Performed by: PHYSICIAN ASSISTANT

## 2022-07-28 PROCEDURE — 3079F DIAST BP 80-89 MM HG: CPT | Performed by: PHYSICIAN ASSISTANT

## 2022-07-28 PROCEDURE — 81002 URINALYSIS NONAUTO W/O SCOPE: CPT | Performed by: PHYSICIAN ASSISTANT

## 2022-07-28 NOTE — PROGRESS NOTES
7/28/2022      Chief Complaint   Patient presents with    Follow-up    Benign Prostatic Hypertrophy    Urinary Incontinence         Assessment and Plan    [de-identified] y o  male managed by Dr Anup Henry    1  BPH  - continue current regimen flomax/proscar/ditropan daily, but switch ditropan to bedtime  - urine dip normal  - pvr 89ml    F/u 1 year      History of Present Illness  Sachin Alonso is a [de-identified] y o  male here for evaluation of annual follow-up BPH  He had prior cystolitholapaxy of bladder stones over five years ago, and a L bladder biopsy with mitomycin-C due to a bladder lesion in 2017, though pathology was fortunately negative after the fact  He has not had any microscopic or gross hematuria since then  He currently uses double-dose Flomax, Proscar and Ditropan 10 mg XL daily for frequency  Symptoms are now well controlled with wearing 0 pads  He has no urgency  Good stream  He reports waking 2-3 x per night to void, this is increased from last year  He continues to use daily cranberry capsule/cranberry sauce which he has used since teenage years for enuresis/nocturia  He had a PSA earlier this year to 0 8, stable to his previous baseline for many years, recommend no further prostate cancer screening at age [de-identified]  Review of Systems   Constitutional: Negative for activity change, appetite change, chills, fever and unexpected weight change  HENT: Negative  Respiratory: Negative  Negative for shortness of breath  Cardiovascular: Negative  Negative for chest pain  Gastrointestinal: Negative for abdominal pain, diarrhea, nausea and vomiting  Endocrine: Negative  Genitourinary: Positive for frequency  Negative for decreased urine volume, difficulty urinating, dysuria, flank pain, hematuria, scrotal swelling, testicular pain and urgency  Musculoskeletal: Negative for back pain and gait problem  Skin: Negative  Allergic/Immunologic: Negative  Neurological: Negative      Hematological: Negative for adenopathy  Does not bruise/bleed easily  Vitals  Vitals:    07/28/22 0843   BP: 120/82   BP Location: Left arm   Patient Position: Sitting   Cuff Size: Adult   Pulse: 60   Weight: 82 6 kg (182 lb)   Height: 5' 6" (1 676 m)       Physical Exam  Vitals and nursing note reviewed  Constitutional:       General: He is not in acute distress  Appearance: Normal appearance  He is well-developed  He is not diaphoretic  HENT:      Head: Normocephalic and atraumatic  Pulmonary:      Effort: Pulmonary effort is normal       Comments: No cough or audible wheeze  Abdominal:      General: There is no distension  Tenderness: There is no abdominal tenderness  There is no right CVA tenderness or left CVA tenderness  Musculoskeletal:      Right lower leg: No edema  Left lower leg: No edema  Skin:     General: Skin is warm and dry  Neurological:      Mental Status: He is alert and oriented to person, place, and time  Gait: Gait normal    Psychiatric:         Speech: Speech normal          Behavior: Behavior normal            Past History  Past Medical History:   Diagnosis Date    Abnormal blood chemistry     Last Assessed: 7/19/2013    Abnormal glucose     Last Assessed: 11/6/2015    Arthritis     Bladder tumor     BPH (benign prostatic hypertrophy)     Cataracts, bilateral     "start of"    Coronary artery disease     2 stents  MI x2    Diabetes mellitus ([address] )     Diet controlled pre-diabetic    Erectile dysfunction of non-organic origin     Last Assessed: 5/9/2014    Fatigue     Resolved: 2/23/2015    Glaucoma     "Start of"    Hematuria, microscopic     Last Assessed: 11/7/2016    History of bladder stone     Hyperlipidemia     Hypertension     Knee pain, bilateral     If walking far   No assistive devices    Myocardial infarction [facility]) 2011, 2012    x2    Primary insomnia     Last Assessed; 7/20/2016    Recurrent right inguinal hernia     Last Assessed: 2/23/2015    Urinary incontinence     Urinary urgency     Last Assessed: 10/30/2015    Wears glasses      Social History     Socioeconomic History    Marital status: /Civil Union     Spouse name: None    Number of children: None    Years of education: None    Highest education level: None   Occupational History    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   Tobacco Use    Smoking status: Former Smoker     Types: Cigarettes, Pipe    Smokeless tobacco: Never Used    Tobacco comment: Quit 50 years ago and only smoked for 1 year   Vaping Use    Vaping Use: Never used   Substance and Sexual Activity    Alcohol use:  Yes     Alcohol/week: 2 0 standard drinks     Types: 2 Shots of liquor per week     Comment: 2 shots of liquor weekly    Drug use: No    Sexual activity: None   Other Topics Concern    None   Social History Narrative    Always uses seat belt    Daily caffeine consumption, 2-3 servings a day    Feels safe at home     Social Determinants of Health     Financial Resource Strain: Not on file   Food Insecurity: Not on file   Transportation Needs: Not on file   Physical Activity: Not on file   Stress: Not on file   Social Connections: Not on file   Intimate Partner Violence: Not on file   Housing Stability: Not on file     Social History     Tobacco Use   Smoking Status Former Smoker    Types: Cigarettes, Pipe   Smokeless Tobacco Never Used   Tobacco Comment    Quit 50 years ago and only smoked for 1 year     Family History   Problem Relation Age of Onset    No Known Problems Mother         Diabetes per [employer]    No Known Problems Father     Skin cancer Brother     Diabetes Brother     Bipolar disorder Other     Diabetes Family     Hypertension Family     Substance Abuse Neg Hx     Alcohol abuse Neg Hx        The following portions of the patient's history were reviewed and updated as appropriate: allergies, current medications, past medical history, past social history, past surgical history and problem list     Results  Recent Results (from the past 1 hour(s))   POCT urine dip    Collection Time: 07/28/22  8:50 AM   Result Value Ref Range    LEUKOCYTE ESTERASE,UA -     NITRITE,UA -     SL AMB POCT UROBILINOGEN 0 2     POCT URINE PROTEIN -      PH,UA 5 0     BLOOD,UA -     SPECIFIC GRAVITY,UA 1 010     KETONES,UA -     BILIRUBIN,UA -     GLUCOSE, UA -      COLOR,UA yellow     CLARITY,UA clear    POCT Measure PVR    Collection Time: 07/28/22  8:51 AM   Result Value Ref Range    POST-VOID RESIDUAL VOLUME, ML POC 89 mL   ]  Lab Results   Component Value Date    PSA 0 8 04/13/2022    PSA 0 6 03/12/2018     Lab Results   Component Value Date    GLUCOSE 117 11/05/2015    CALCIUM 11 1 (H) 04/13/2022     07/31/2017    K 3 9 04/13/2022    CO2 26 04/13/2022     04/13/2022    BUN 32 (H) 04/13/2022    CREATININE 1 58 (H) 04/13/2022     Lab Results   Component Value Date    WBC 6 13 04/13/2022    HGB 15 5 04/13/2022    HCT 45 9 04/13/2022    MCV 94 04/13/2022     04/13/2022

## 2022-08-19 DIAGNOSIS — I10 BENIGN ESSENTIAL HYPERTENSION: ICD-10-CM

## 2022-08-20 RX ORDER — VALSARTAN AND HYDROCHLOROTHIAZIDE 320; 25 MG/1; MG/1
1 TABLET, FILM COATED ORAL
Qty: 90 TABLET | Refills: 0 | Status: SHIPPED | OUTPATIENT
Start: 2022-08-20

## 2022-08-23 ENCOUNTER — TELEPHONE (OUTPATIENT)
Dept: GERIATRICS | Age: 82
End: 2022-08-23

## 2022-08-23 NOTE — TELEPHONE ENCOUNTER
Called and spoke with pt regarding referral to [facility]  Pt would like to schedule an assessment  His wife was not home at the time of the call and she has the calendar with her  Pt is going to call back to schedule appointment  Referral is in the workqueue

## 2022-09-06 ENCOUNTER — TELEPHONE (OUTPATIENT)
Dept: GERIATRICS | Age: 82
End: 2022-09-06

## 2022-09-06 NOTE — TELEPHONE ENCOUNTER
[facility]  [address] , [address] [facility][[address]], [address]    [address] [place] [address],[address]  [facility], [address] [place] [address] [place]    [phone]    Telephone Intake: Geriatric Assessment     Referral source: PCP, Dr Jacqueline Parsons who is scheduling/relationship to pt: pt and wife   Caller's phone number: [phone]     Reason for referral: Patient concerns , Family member concerns and Provider concerns regarding memory concerns  If there are behavioral concerns, is the pt prescribed medications to manage these? no   If so, how many? no   Has the patient ever had an inpatient psychiatric hospitalization? No, no  What is the goal of the visit? Wife notices his memory is not as good as it once was  Wife thinks it is currently a little better than it was a year ago  Pt and wife want to get an assessment on his memory  Has the patient been seen by a Neurologist or Geriatrician? No   If yes, is this appointment for a second opinion? No  Has the patient ever been diagnosed with dementia? No      Preferred language? English  Highest education level? HS  Does the patient wear glasses? Yes   Does the patient use hearing aids? No     Is there a living will/healthcare POA in place/If so, who? Yes , wife Alphia Coma) if something happens to both of them, it would be the kids    Does the pt/caregiver have access for a virtual visit (computer/smart phone with audio/video)? No    Caller was informed: Please make sure the pt is accompanied by someone who knows them well / caregiver / family member to participate in this appointment  Who will accompany the pt (name and relationship)? Wife, Richard Laird  Phone number of person accompanying pt: [phone]     Office packet mailed out to: Abimael Esquivel [address] [place] [address] [employer]    Added to wait list for sooner appointments?  Yes (only Monday or Tuesday)    NOTE FOR : Please route to provider for chart review prior to scheduling and let the caller know that this phone intake will be reviewed IF -   Pt was recently hospitalized   Pt is prescribed medications for behavior management or has a history of psychiatric hospitalization   Pt plans to attend alone

## 2022-09-22 DIAGNOSIS — N40.1 BENIGN PROSTATIC HYPERPLASIA WITH LOWER URINARY TRACT SYMPTOMS, SYMPTOM DETAILS UNSPECIFIED: ICD-10-CM

## 2022-09-25 RX ORDER — FINASTERIDE 5 MG/1
TABLET, FILM COATED ORAL
Qty: 90 TABLET | Refills: 0 | Status: SHIPPED | OUTPATIENT
Start: 2022-09-25

## 2022-10-03 ENCOUNTER — RA CDI HCC (OUTPATIENT)
Dept: OTHER | Facility: HOSPITAL | Age: 82
End: 2022-10-03

## 2022-10-03 NOTE — PROGRESS NOTES
[facility]  coding opportunities       Chart reviewed, no opportunity found: CHART REVIEWED, NO OPPORTUNITY FOUND        Patients Insurance     Medicare Insurance: [employer] [facility] Advantage

## 2022-10-10 ENCOUNTER — OFFICE VISIT (OUTPATIENT)
Dept: FAMILY MEDICINE CLINIC | Facility: CLINIC | Age: 82
End: 2022-10-10
Payer: COMMERCIAL

## 2022-10-10 VITALS
HEART RATE: 70 BPM | HEIGHT: 66 IN | SYSTOLIC BLOOD PRESSURE: 128 MMHG | BODY MASS INDEX: 29.41 KG/M2 | WEIGHT: 183 LBS | DIASTOLIC BLOOD PRESSURE: 80 MMHG | RESPIRATION RATE: 16 BRPM | OXYGEN SATURATION: 99 % | TEMPERATURE: 97.7 F

## 2022-10-10 DIAGNOSIS — E66.3 OVERWEIGHT (BMI 25.0-29.9): ICD-10-CM

## 2022-10-10 DIAGNOSIS — E11.22 TYPE 2 DIABETES MELLITUS WITH CHRONIC KIDNEY DISEASE, WITHOUT LONG-TERM CURRENT USE OF INSULIN, UNSPECIFIED CKD STAGE (HCC): ICD-10-CM

## 2022-10-10 DIAGNOSIS — R32 URINARY INCONTINENCE, UNSPECIFIED TYPE: ICD-10-CM

## 2022-10-10 DIAGNOSIS — I25.10 ARTERIOSCLEROSIS OF CORONARY ARTERY: ICD-10-CM

## 2022-10-10 DIAGNOSIS — I10 PRIMARY HYPERTENSION: ICD-10-CM

## 2022-10-10 DIAGNOSIS — N40.1 BENIGN PROSTATIC HYPERPLASIA WITH LOWER URINARY TRACT SYMPTOMS, SYMPTOM DETAILS UNSPECIFIED: ICD-10-CM

## 2022-10-10 DIAGNOSIS — Z00.00 ENCOUNTER FOR ANNUAL WELLNESS EXAM IN MEDICARE PATIENT: Primary | ICD-10-CM

## 2022-10-10 DIAGNOSIS — N18.30 STAGE 3 CHRONIC KIDNEY DISEASE, UNSPECIFIED WHETHER STAGE 3A OR 3B CKD (HCC): ICD-10-CM

## 2022-10-10 DIAGNOSIS — M16.11 OSTEOARTHRITIS OF RIGHT HIP, UNSPECIFIED OSTEOARTHRITIS TYPE: ICD-10-CM

## 2022-10-10 DIAGNOSIS — R41.3 MEMORY PROBLEM: ICD-10-CM

## 2022-10-10 DIAGNOSIS — Z23 NEED FOR VACCINATION: ICD-10-CM

## 2022-10-10 DIAGNOSIS — I10 BENIGN ESSENTIAL HYPERTENSION: ICD-10-CM

## 2022-10-10 DIAGNOSIS — E78.2 MIXED HYPERLIPIDEMIA: ICD-10-CM

## 2022-10-10 PROCEDURE — 99214 OFFICE O/P EST MOD 30 MIN: CPT | Performed by: FAMILY MEDICINE

## 2022-10-10 PROCEDURE — G0008 ADMIN INFLUENZA VIRUS VAC: HCPCS | Performed by: FAMILY MEDICINE

## 2022-10-10 PROCEDURE — G0439 PPPS, SUBSEQ VISIT: HCPCS | Performed by: FAMILY MEDICINE

## 2022-10-10 PROCEDURE — 90662 IIV NO PRSV INCREASED AG IM: CPT | Performed by: FAMILY MEDICINE

## 2022-10-10 NOTE — PATIENT INSTRUCTIONS
Medicare Preventive Visit Patient Instructions  Thank you for completing your Welcome to Medicare Visit or Medicare Annual Wellness Visit today  Your next wellness visit will be due in one year (10/11/2023)  The screening/preventive services that you may require over the next 5-10 years are detailed below  Some tests may not apply to you based off risk factors and/or age  Screening tests ordered at today's visit but not completed yet may show as past due  Also, please note that scanned in results may not display below  Preventive Screenings:  Service Recommendations Previous Testing/Comments   Colorectal Cancer Screening  · Colonoscopy    · Fecal Occult Blood Test (FOBT)/Fecal Immunochemical Test (FIT)  · Fecal DNA/Cologuard Test  · Flexible Sigmoidoscopy Age: 39-70 years old   Colonoscopy: every 10 years (May be performed more frequently if at higher risk)  OR  FOBT/FIT: every 1 year  OR  Cologuard: every 3 years  OR  Sigmoidoscopy: every 5 years  Screening may be recommended earlier than age 39 if at higher risk for colorectal cancer  Also, an individualized decision between you and your healthcare provider will decide whether screening between the ages of 74-80 would be appropriate   Colonoscopy: 11/30/2015  FOBT/FIT: Not on file  Cologuard: Not on file  Sigmoidoscopy: Not on file          Prostate Cancer Screening Individualized decision between patient and health care provider in men between ages of 53-78   Medicare will cover every 12 months beginning on the day after your 50th birthday PSA: 0 8 ng/mL     Screening Not Indicated     Hepatitis C Screening Once for adults born between 1945 and 1965  More frequently in patients at high risk for Hepatitis C Hep C Antibody: Not on file        Diabetes Screening 1-2 times per year if you're at risk for diabetes or have pre-diabetes Fasting glucose: 139 mg/dL (4/13/2022)  A1C: 6 3 % (4/13/2022)  Screening Not Indicated  History Diabetes   Cholesterol Screening Once every 5 years if you don't have a lipid disorder  May order more often based on risk factors  Lipid panel: 04/13/2022  Screening Not Indicated  History Lipid Disorder      Other Preventive Screenings Covered by Medicare:  1  Abdominal Aortic Aneurysm (AAA) Screening: covered once if your at risk  You're considered to be at risk if you have a family history of AAA or a male between the age of 73-68 who smoking at least 100 cigarettes in your lifetime  2  Lung Cancer Screening: covers low dose CT scan once per year if you meet all of the following conditions: (1) Age 50-69; (2) No signs or symptoms of lung cancer; (3) Current smoker or have quit smoking within the last 15 years; (4) You have a tobacco smoking history of at least 20 pack years (packs per day x number of years you smoked); (5) You get a written order from a healthcare provider  3  Glaucoma Screening: covered annually if you're considered high risk: (1) You have diabetes OR (2) Family history of glaucoma OR (3)  aged 48 and older OR (3)  American aged 72 and older  3  Osteoporosis Screening: covered every 2 years if you meet one of the following conditions: (1) Have a vertebral abnormality; (2) On glucocorticoid therapy for more than 3 months; (3) Have primary hyperparathyroidism; (4) On osteoporosis medications and need to assess response to drug therapy  5  HIV Screening: covered annually if you're between the age of 12-76  Also covered annually if you are younger than 13 and older than 72 with risk factors for HIV infection  For pregnant patients, it is covered up to 3 times per pregnancy      Immunizations:  Immunization Recommendations   Influenza Vaccine Annual influenza vaccination during flu season is recommended for all persons aged >= 6 months who do not have contraindications   Pneumococcal Vaccine   * Pneumococcal conjugate vaccine = PCV13 (Prevnar 13), PCV15 (Vaxneuvance), PCV20 (Prevnar 20)  * Pneumococcal polysaccharide vaccine = PPSV23 (Pneumovax) Adults 2364 years old: 1-3 doses may be recommended based on certain risk factors  Adults 72 years old: 1-2 doses may be recommended based off what pneumonia vaccine you previously received   Hepatitis B Vaccine 3 dose series if at intermediate or high risk (ex: diabetes, end stage renal disease, liver disease)   Tetanus (Td) Vaccine - COST NOT COVERED BY MEDICARE PART B Following completion of primary series, a booster dose should be given every 10 years to maintain immunity against tetanus  Td may also be given as tetanus wound prophylaxis  Tdap Vaccine - COST NOT COVERED BY MEDICARE PART B Recommended at least once for all adults  For pregnant patients, recommended with each pregnancy  Shingles Vaccine (Shingrix) - COST NOT COVERED BY MEDICARE PART B  2 shot series recommended in those aged 48 and above     Health Maintenance Due:      Topic Date Due   • Colorectal Cancer Screening  11/30/2020     Immunizations Due:      Topic Date Due   • COVID-19 Vaccine (4 - Booster for Moderna series) 04/22/2022   • Influenza Vaccine (1) 09/01/2022     Advance Directives   What are advance directives? Advance directives are legal documents that state your wishes and plans for medical care  These plans are made ahead of time in case you lose your ability to make decisions for yourself  Advance directives can apply to any medical decision, such as the treatments you want, and if you want to donate organs  What are the types of advance directives? There are many types of advance directives, and each state has rules about how to use them  You may choose a combination of any of the following:  · Living will: This is a written record of the treatment you want  You can also choose which treatments you do not want, which to limit, and which to stop at a certain time  This includes surgery, medicine, IV fluid, and tube feedings     · Durable power of  for healthcare [place] [facility]): This is a written record that states who you want to make healthcare choices for you when you are unable to make them for yourself  This person, called a proxy, is usually a family member or a friend  You may choose more than 1 proxy  · Do not resuscitate (DNR) order:  A DNR order is used in case your heart stops beating or you stop breathing  It is a request not to have certain forms of treatment, such as CPR  A DNR order may be included in other types of advance directives  · Medical directive: This covers the care that you want if you are in a coma, near death, or unable to make decisions for yourself  You can list the treatments you want for each condition  Treatment may include pain medicine, surgery, blood transfusions, dialysis, IV or tube feedings, and a ventilator (breathing machine)  · Values history: This document has questions about your views, beliefs, and how you feel and think about life  This information can help others choose the care that you would choose  Why are advance directives important? An advance directive helps you control your care  Although spoken wishes may be used, it is better to have your wishes written down  Spoken wishes can be misunderstood, or not followed  Treatments may be given even if you do not want them  An advance directive may make it easier for your family to make difficult choices about your care  Weight Management   Why it is important to manage your weight:  Being overweight increases your risk of health conditions such as heart disease, high blood pressure, type 2 diabetes, and certain types of cancer  It can also increase your risk for osteoarthritis, sleep apnea, and other respiratory problems  Aim for a slow, steady weight loss  Even a small amount of weight loss can lower your risk of health problems  How to lose weight safely:  A safe and healthy way to lose weight is to eat fewer calories and get regular exercise   You can lose up about 1 pound a week by decreasing the number of calories you eat by 500 calories each day  Healthy meal plan for weight management:  A healthy meal plan includes a variety of foods, contains fewer calories, and helps you stay healthy  A healthy meal plan includes the following:  · Eat whole-grain foods more often  A healthy meal plan should contain fiber  Fiber is the part of grains, fruits, and vegetables that is not broken down by your body  Whole-grain foods are healthy and provide extra fiber in your diet  Some examples of whole-grain foods are whole-wheat breads and pastas, oatmeal, brown rice, and bulgur  · Eat a variety of vegetables every day  Include dark, leafy greens such as spinach, kale, noah greens, and mustard greens  Eat yellow and orange vegetables such as carrots, sweet potatoes, and winter squash  · Eat a variety of fruits every day  Choose fresh or canned fruit (canned in its own juice or light syrup) instead of juice  Fruit juice has very little or no fiber  · Eat low-fat dairy foods  Drink fat-free (skim) milk or 1% milk  Eat fat-free yogurt and low-fat cottage cheese  Try low-fat cheeses such as mozzarella and other reduced-fat cheeses  · Choose meat and other protein foods that are low in fat  Choose beans or other legumes such as split peas or lentils  Choose fish, skinless poultry (chicken or turkey), or lean cuts of red meat (beef or pork)  Before you cook meat or poultry, cut off any visible fat  · Use less fat and oil  Try baking foods instead of frying them  Add less fat, such as margarine, sour cream, regular salad dressing and mayonnaise to foods  Eat fewer high-fat foods  Some examples of high-fat foods include french fries, doughnuts, ice cream, and cakes  · Eat fewer sweets  Limit foods and drinks that are high in sugar  This includes candy, cookies, regular soda, and sweetened drinks  Exercise:  Exercise at least 30 minutes per day on most days of the week   Some examples of exercise include walking, biking, dancing, and swimming  You can also fit in more physical activity by taking the stairs instead of the elevator or parking farther away from stores  Ask your healthcare provider about the best exercise plan for you  © Copyright [employer] 2018 Information is for End User's use only and may not be sold, redistributed or otherwise used for commercial purposes   All illustrations and images included in CareNotes® are the copyrighted property of A D A M , Inc  or [address] [place] [address]

## 2022-10-10 NOTE — PROGRESS NOTES
Assessment and Plan:   Medicare wellness visit    Problem List Items Addressed This Visit     Arteriosclerosis of coronary artery    Benign essential hypertension    Type 2 diabetes mellitus with chronic kidney disease, without long-term current use of insulin, unspecified CKD stage (HCC)    Hyperlipidemia    Osteoarthritis of right hip    Memory problem    Hypertension    Urinary incontinence    Overweight (BMI 25 0-29  9)    Stage 3 chronic kidney disease, unspecified whether stage 3a or 3b CKD ([facility] )      Other Visit Diagnoses     Encounter for annual wellness exam in Medicare patient    -  Primary    Need for vaccination        Relevant Orders    influenza vaccine, high-dose, PF 0 7 mL (FLUZONE HIGH-DOSE) (Completed)           Preventive health issues were discussed with patient, and age appropriate screening tests were ordered as noted in patient's After Visit Summary  Personalized health advice and appropriate referrals for health education or preventive services given if needed, as noted in patient's After Visit Summary  History of Present Illness:     Patient presents for a Medicare Wellness Visit    HPI   Patient Care Team:  Taylor Bass DO as PCP - General  MD Kianna Villarreal PA-C Despina Code, MD Stewart Silvas, MD     Review of Systems:     Review of Systems     Problem List:     Patient Active Problem List   Diagnosis   • Arteriosclerosis of coronary artery   • Benign essential hypertension   • Benign prostatic hyperplasia   • Type 2 diabetes mellitus with chronic kidney disease, without long-term current use of insulin, unspecified CKD stage ([facility] )   • Hyperlipidemia   • Osteoarthritis of right hip   • Primary insomnia   • Vitamin D deficiency   • Allergic rhinitis   • Memory problem   • Bladder calculus   • Symptomatic bradycardia   • Hypertension   • Chronic nasal congestion   • Urinary incontinence   • Right groin pain   • Overweight (BMI 25 0-29  9)   • Stage 3 chronic kidney disease, unspecified whether stage 3a or 3b CKD ([facility] )      Past Medical and Surgical History:     Past Medical History:   Diagnosis Date   • Abnormal blood chemistry     Last Assessed: 7/19/2013   • Abnormal glucose     Last Assessed: 11/6/2015   • Arthritis    • Bladder tumor    • BPH (benign prostatic hypertrophy)    • Cataracts, bilateral     "start of"   • Coronary artery disease     2 stents  MI x2   • Diabetes mellitus ([facility] )     Diet controlled pre-diabetic   • Erectile dysfunction of non-organic origin     Last Assessed: 5/9/2014   • Fatigue     Resolved: 2/23/2015   • Glaucoma     "Start of"   • Hematuria, microscopic     Last Assessed: 11/7/2016   • History of bladder stone    • Hyperlipidemia    • Hypertension    • Knee pain, bilateral     If walking far   No assistive devices   • Myocardial infarction [facility]) 2011, 2012    x2   • Primary insomnia     Last Assessed; 7/20/2016   • Recurrent right inguinal hernia     Last Assessed: 2/23/2015   • Urinary incontinence    • Urinary urgency     Last Assessed: 10/30/2015   • Wears glasses      Past Surgical History:   Procedure Laterality Date   • BLADDER STONE REMOVAL      Laser procedure   • CARDIAC CATHETERIZATION  2003    RCA stenting and 2004 stenting to circuflex   • COLONOSCOPY     • CORONARY ANGIOPLASTY WITH STENT PLACEMENT      Has 2 stents   • CYSTOSCOPY  11/11/2015    w/ Cystolitholapaxy   • HERNIA REPAIR Bilateral 03/05/2015    Inguinal   • JOINT REPLACEMENT Bilateral     TKR   • JOINT REPLACEMENT Right     Right THR   • LA CYSTOURETHROSCOPY,FULGUR <0 5 CM LESN N/A 2/8/2017    Procedure: CYSTOSCOPY WITH BIOPSIES WITH FULGURATION, INSTILLATION OF MYTOMYCIN, DIALATION OF MEATUS ;  Surgeon: Jeffrey Linares MD;  Location: [facility];  Service: Urology   • WISDOM TOOTH EXTRACTION        Family History:     Family History   Problem Relation Age of Onset   • No Known Problems Mother         Diabetes per [employer]   • No Known Problems Father    • Skin cancer Brother    • Diabetes Brother    • Bipolar disorder Other    • Diabetes Family    • Hypertension Family    • Substance Abuse Neg Hx    • Alcohol abuse Neg Hx       Social History:     Social History     Socioeconomic History   • Marital status: /Civil Union     Spouse name: None   • Number of children: None   • Years of education: None   • Highest education level: None   Occupational History   • Occupation: landscaping owner   retired   Tobacco Use   • Smoking status: Former Smoker     Types: Cigarettes, Pipe   • Smokeless tobacco: Never Used   • Tobacco comment: Quit 50 years ago and only smoked for 1 year   Vaping Use   • Vaping Use: Never used   Substance and Sexual Activity   • Alcohol use:  Yes     Alcohol/week: 2 0 standard drinks     Types: 2 Shots of liquor per week     Comment: 2 shots of liquor weekly   • Drug use: No   • Sexual activity: None   Other Topics Concern   • None   Social History Narrative    Always uses seat belt    Daily caffeine consumption, 2-3 servings a day    Feels safe at home     Social Determinants of Health     Financial Resource Strain: Not on file   Food Insecurity: Not on file   Transportation Needs: Not on file   Physical Activity: Not on file   Stress: Not on file   Social Connections: Not on file   Intimate Partner Violence: Not on file   Housing Stability: Not on file      Medications and Allergies:     Current Outpatient Medications   Medication Sig Dispense Refill   • acetaminophen (TYLENOL) 325 mg tablet Take 650 mg by mouth every 6 (six) hours as needed for mild pain     • amLODIPine (NORVASC) 2 5 mg tablet Take 1 tablet (2 5 mg total) by mouth daily at bedtime 90 tablet 1   • aspirin (ECOTRIN) 325 mg EC tablet Take 1 tablet by mouth     • cholecalciferol (VITAMIN D3) 1,000 units tablet Take by mouth     • finasteride (PROSCAR) 5 mg tablet take 1 tablet by mouth once daily 90 tablet 0   • ketoconazole (NIZORAL) 2 % cream APPLY TO AFFECTED AREAS EVERY DAY 0   • nitroglycerin (NITROSTAT) 0 4 mg SL tablet Place 1 tablet (0 4 mg total) under the tongue every 5 (five) minutes as needed for chest pain 5 tablet 2   • oxybutynin (DITROPAN-XL) 10 MG 24 hr tablet Take 1 tablet (10 mg total) by mouth daily at bedtime 90 tablet 3   • tamsulosin (FLOMAX) 0 4 mg Take 1 capsule (0 4 mg total) by mouth 2 (two) times a day 180 capsule 1   • valsartan-hydrochlorothiazide (DIOVAN-HCT) 320-25 MG per tablet Take 1 tablet by mouth daily at bedtime 90 tablet 0   • fluticasone (FLONASE) 50 mcg/act nasal spray 2 sprays into each nostril daily for 30 days 1 Bottle 1   • Omega-3 Fatty Acids (OMEGA 3 PO) Take 2 capsules by mouth daily (Patient not taking: No sig reported)     • Red Yeast Rice Extract (RED YEAST RICE PO) Take 2 capsules by mouth daily (Patient not taking: No sig reported)       No current facility-administered medications for this visit  No Known Allergies   Immunizations:     Immunization History   Administered Date(s) Administered   • COVID-19 MODERNA VACC 0 5 ML IM 03/24/2021, 04/23/2021, 12/22/2021   • INFLUENZA 09/04/2018, 09/03/2020   • Influenza Split High Dose Preservative Free IM 08/30/2017   • Influenza, high dose seasonal 0 7 mL 10/08/2021, 10/10/2022   • Pneumococcal Conjugate 13-Valent 03/20/2015   • Pneumococcal Polysaccharide PPV23 07/19/2013   • Zoster 01/01/2013   • Zoster Vaccine Recombinant 12/12/2019, 02/26/2020   • influenza, trivalent, adjuvanted 09/30/2019      Health Maintenance:         Topic Date Due   • Colorectal Cancer Screening  11/30/2020         Topic Date Due   • COVID-19 Vaccine (4 - Booster for Balinda Cera series) 04/22/2022      Medicare Screening Tests and Risk Assessments:     Rhianna George is here for his Subsequent Wellness visit  Last Medicare Wellness visit information reviewed, patient interviewed and updates made to the record today  Health Risk Assessment:   Patient rates overall health as fair   Patient feels that their physical health rating is same  Patient is satisfied with their life  Eyesight was rated as same  Hearing was rated as same  Patient feels that their emotional and mental health rating is same  Patients states they are never, rarely angry  Patient states they are never, rarely unusually tired/fatigued  Pain experienced in the last 7 days has been none  Patient states that he has experienced no weight loss or gain in last 6 months  Depression Screening:   PHQ-2 Score: 0      Fall Risk Screening: In the past year, patient has experienced: no history of falling in past year      Home Safety:  Patient does not have trouble with stairs inside or outside of their home  Patient has working smoke alarms and has working carbon monoxide detector  Home safety hazards include: none  Nutrition:   Current diet is Regular  Medications:   Patient is currently taking over-the-counter supplements  OTC medications include: see medication list  Patient is able to manage medications  Activities of Daily Living (ADLs)/Instrumental Activities of Daily Living (IADLs):   Walk and transfer into and out of bed and chair?: Yes  Dress and groom yourself?: Yes    Bathe or shower yourself?: Yes    Feed yourself? Yes  Do your laundry/housekeeping?: Yes  Manage your money, pay your bills and track your expenses?: Yes  Make your own meals?: Yes    Do your own shopping?: Yes    Previous Hospitalizations:   Any hospitalizations or ED visits within the last 12 months?: No      Advance Care Planning:   Living will: Yes    Durable POA for healthcare:  Yes    Advanced directive: Yes    Advanced directive counseling given: Yes    Five wishes given: Yes    End of Life Decisions reviewed with patient: Yes    Provider agrees with end of life decisions: Yes      Cognitive Screening:   Provider or family/friend/caregiver concerned regarding cognition?: No    PREVENTIVE SCREENINGS      Cardiovascular Screening:    General: Screening Not Indicated and History Lipid Disorder      Diabetes Screening:     General: Screening Not Indicated and History Diabetes      Colorectal Cancer Screening:     General: Risks and Benefits Discussed      Prostate Cancer Screening:    General: Screening Not Indicated      Osteoporosis Screening:    General: Risks and Benefits Discussed      Abdominal Aortic Aneurysm (AAA) Screening:    Risk factors include: tobacco use        General: Risks and Benefits Discussed      Lung Cancer Screening:     General: Screening Not Indicated      Hepatitis C Screening:    General: Risks and Benefits Discussed    Screening, Brief Intervention, and Referral to Treatment (SBIRT)    Screening    Typical number of drinks in a week: 0    Single Item Drug Screening:  How often have you used an illegal drug (including marijuana) or a prescription medication for non-medical reasons in the past year? never    Single Item Drug Screen Score: 0  Interpretation: Negative screen for possible drug use disorder    No exam data present     Physical Exam:     /80 (BP Location: Left arm, Patient Position: Sitting, Cuff Size: Adult)   Pulse 70   Temp 97 7 °F (36 5 °C) (Temporal)   Resp 16   Ht 5' 5 75" (1 67 m)   Wt 83 kg (183 lb)   SpO2 99%   BMI 29 76 kg/m²     Physical Exam     Kate Jones DO

## 2022-10-10 NOTE — ASSESSMENT & PLAN NOTE
Patient states that his memory has improved recently  He has been seen by Neurology in the past   I have also referred him to Geriatrics    He has upcoming appointment April 2023

## 2022-10-10 NOTE — PROGRESS NOTES
Name: Ray Carrasco      : 1940      MRN: [id number]  Encounter Provider: Mono Jay DO  Encounter Date: 10/10/2022   Encounter department: [address] [place] [address]     1  Encounter for annual wellness exam in Medicare patient    2  Need for vaccination  -     influenza vaccine, high-dose, PF 0 7 mL (FLUZONE HIGH-DOSE)    3  Stage 3 chronic kidney disease, unspecified whether stage 3a or 3b CKD [facility])  Assessment & Plan:  Lab Results   Component Value Date    EGFR 40 2022    EGFR 53 10/26/2018    EGFR >60 0 2017    CREATININE 1 58 (H) 2022    CREATININE 1 29 10/26/2018    CREATININE 1 49 (H) 2018   Last creatinine 40  Patient is due for labs  Will call with results       4  Arteriosclerosis of coronary artery  Assessment & Plan:  History of RCA and circumflex disease  Doing well without chest pain or shortness of breath  Continue diet exercise  Continue follow-up with cardiologist as directed      5  Type 2 diabetes mellitus with chronic kidney disease, without long-term current use of insulin, unspecified CKD stage [facility])  Assessment & Plan:    Lab Results   Component Value Date    HGBA1C 6 3 (H) 2022   Last A1c from 2022 was 6 3%  Continue diet control  Foot exam performed today  Patient is due for labs  Will call with results      6  Benign essential hypertension  Assessment & Plan:  Blood pressure reasonably controlled on amlodipine 2 5 and valsartan /25      7  Mixed hyperlipidemia  Assessment & Plan:  Doing well on red yeast rice and Omega 3  Continue low-fat diet exercise  Patient is due for labs  Will call with results      8  Osteoarthritis of right hip, unspecified osteoarthritis type  Assessment & Plan:  Status post right total hip arthroplasty by Dr Reanna Lombardo  Follow-up with orthopedics as directed      9  Memory problem  Assessment & Plan:  Patient states that his memory has improved recently    He has been seen by Neurology in the past   I have also referred him to Geriatrics  He has upcoming appointment April 2023      10  Primary hypertension    11  Urinary incontinence, unspecified type  Assessment & Plan:  Stable on oxybutynin 10 mg  Continue follow-up with Urology      12  Overweight (BMI 25 0-29  9)  Assessment & Plan:  Current weight 183, BMI 29 76      13  Benign prostatic hyperplasia with lower urinary tract symptoms, symptom details unspecified  Assessment & Plan:  Still with urinary stream issues on finasteride and tamsulosin  Recommend follow-up with Urology      Patient COVID vaccination  Patient had Pneumovax  Patient had Shingrix  Flu shot given today  Discussed tetanus booster at pharmacy    Patient is due for fasting blood work  He states he will get these done shortly  Will call with results    SIX MONTHS (WILL DETERMINE NEED FOR LABS BASED ON HIS UPCOMING RESULTS)       Subjective     PATIENT PRESENTS RECHECK CHRONIC MEDICAL PROBLEMS TODAY  COMPLIANT WITH PRESCRIBED MEDICATIONS  Review of Systems   Respiratory: Negative  Cardiovascular: Negative  Gastrointestinal: Negative  Genitourinary: Negative  Past Medical History:   Diagnosis Date   • Abnormal blood chemistry     Last Assessed: 7/19/2013   • Abnormal glucose     Last Assessed: 11/6/2015   • Arthritis    • Bladder tumor    • BPH (benign prostatic hypertrophy)    • Cataracts, bilateral     "start of"   • Coronary artery disease     2 stents  MI x2   • Diabetes mellitus ([facility] )     Diet controlled pre-diabetic   • Erectile dysfunction of non-organic origin     Last Assessed: 5/9/2014   • Fatigue     Resolved: 2/23/2015   • Glaucoma     "Start of"   • Hematuria, microscopic     Last Assessed: 11/7/2016   • History of bladder stone    • Hyperlipidemia    • Hypertension    • Knee pain, bilateral     If walking far   No assistive devices   • Myocardial infarction [facility]) 2011, 2012    x2   • Primary insomnia     Last Assessed; 7/20/2016   • Recurrent right inguinal hernia     Last Assessed: 2/23/2015   • Urinary incontinence    • Urinary urgency     Last Assessed: 10/30/2015   • Wears glasses      Past Surgical History:   Procedure Laterality Date   • BLADDER STONE REMOVAL      Laser procedure   • CARDIAC CATHETERIZATION  2003    RCA stenting and 2004 stenting to circuflex   • COLONOSCOPY     • CORONARY ANGIOPLASTY WITH STENT PLACEMENT      Has 2 stents   • CYSTOSCOPY  11/11/2015    w/ Cystolitholapaxy   • HERNIA REPAIR Bilateral 03/05/2015    Inguinal   • JOINT REPLACEMENT Bilateral     TKR   • JOINT REPLACEMENT Right     Right THR   • OR CYSTOURETHROSCOPY,FULGUR <0 5 CM LESN N/A 2/8/2017    Procedure: CYSTOSCOPY WITH BIOPSIES WITH FULGURATION, INSTILLATION OF MYTOMYCIN, DIALATION OF MEATUS ;  Surgeon: Laura Hill MD;  Location: [facility] OR;  Service: Urology   • WISDOM TOOTH EXTRACTION       Family History   Problem Relation Age of Onset   • No Known Problems Mother         Diabetes per [employer]   • No Known Problems Father    • Skin cancer Brother    • Diabetes Brother    • Bipolar disorder Other    • Diabetes Family    • Hypertension Family    • Substance Abuse Neg Hx    • Alcohol abuse Neg Hx      Social History     Socioeconomic History   • Marital status: /Civil Union     Spouse name: None   • Number of children: None   • Years of education: None   • Highest education level: None   Occupational History   • Occupation: landscaping owner   retired   Tobacco Use   • Smoking status: Former Smoker     Types: Cigarettes, Pipe   • Smokeless tobacco: Never Used   • Tobacco comment: Quit 50 years ago and only smoked for 1 year   Vaping Use   • Vaping Use: Never used   Substance and Sexual Activity   • Alcohol use:  Yes     Alcohol/week: 2 0 standard drinks     Types: 2 Shots of liquor per week     Comment: 2 shots of liquor weekly   • Drug use: No   • Sexual activity: None   Other Topics Concern   • None   Social History Narrative    Always uses seat belt    Daily caffeine consumption, 2-3 servings a day    Feels safe at home     Social Determinants of Health     Financial Resource Strain: Not on file   Food Insecurity: Not on file   Transportation Needs: Not on file   Physical Activity: Not on file   Stress: Not on file   Social Connections: Not on file   Intimate Partner Violence: Not on file   Housing Stability: Not on file     Current Outpatient Medications on File Prior to Visit   Medication Sig   • acetaminophen (TYLENOL) 325 mg tablet Take 650 mg by mouth every 6 (six) hours as needed for mild pain   • amLODIPine (NORVASC) 2 5 mg tablet Take 1 tablet (2 5 mg total) by mouth daily at bedtime   • aspirin (ECOTRIN) 325 mg EC tablet Take 1 tablet by mouth   • cholecalciferol (VITAMIN D3) 1,000 units tablet Take by mouth   • finasteride (PROSCAR) 5 mg tablet take 1 tablet by mouth once daily   • ketoconazole (NIZORAL) 2 % cream APPLY TO AFFECTED AREAS EVERY DAY   • nitroglycerin (NITROSTAT) 0 4 mg SL tablet Place 1 tablet (0 4 mg total) under the tongue every 5 (five) minutes as needed for chest pain   • oxybutynin (DITROPAN-XL) 10 MG 24 hr tablet Take 1 tablet (10 mg total) by mouth daily at bedtime   • tamsulosin (FLOMAX) 0 4 mg Take 1 capsule (0 4 mg total) by mouth 2 (two) times a day   • valsartan-hydrochlorothiazide (DIOVAN-HCT) 320-25 MG per tablet Take 1 tablet by mouth daily at bedtime   • fluticasone (FLONASE) 50 mcg/act nasal spray 2 sprays into each nostril daily for 30 days   • Omega-3 Fatty Acids (OMEGA 3 PO) Take 2 capsules by mouth daily (Patient not taking: No sig reported)   • Red Yeast Rice Extract (RED YEAST RICE PO) Take 2 capsules by mouth daily (Patient not taking: No sig reported)     No Known Allergies  Immunization History   Administered Date(s) Administered   • COVID-19 MODERNA VACC 0 5 ML IM 03/24/2021, 04/23/2021, 12/22/2021   • INFLUENZA 09/04/2018, 09/03/2020   • Influenza Split High Dose Preservative Free IM 08/30/2017   • Influenza, high dose seasonal 0 7 mL 10/08/2021, 10/10/2022   • Pneumococcal Conjugate 13-Valent 03/20/2015   • Pneumococcal Polysaccharide PPV23 07/19/2013   • Zoster 01/01/2013   • Zoster Vaccine Recombinant 12/12/2019, 02/26/2020   • influenza, trivalent, adjuvanted 09/30/2019       Objective     /80 (BP Location: Left arm, Patient Position: Sitting, Cuff Size: Adult)   Pulse 70   Temp 97 7 °F (36 5 °C) (Temporal)   Resp 16   Ht 5' 5 75" (1 67 m)   Wt 83 kg (183 lb)   SpO2 99%   BMI 29 76 kg/m²   Patient's shoes and socks removed  Right Foot/Ankle   Right Foot Inspection  Skin Exam: skin normal and skin intact  No dry skin, no warmth, no callus, no erythema, no maceration, no abnormal color, no pre-ulcer, no ulcer and no callus  Toe Exam: ROM and strength within normal limits  Sensory   Monofilament testing: intact    Vascular  Capillary refills: < 3 seconds  The right DP pulse is 2+  The right PT pulse is 2+  Left Foot/Ankle  Left Foot Inspection  Skin Exam: skin normal and skin intact  No dry skin, no warmth, no erythema, no maceration, normal color, no pre-ulcer, no ulcer and no callus  Toe Exam: ROM and strength within normal limits  Sensory   Monofilament testing: intact    Vascular  Capillary refills: < 3 seconds  The left DP pulse is 2+  The left PT pulse is 2+  Assign Risk Category  No deformity present  No loss of protective sensation  No weak pulses  Risk: 0    Physical Exam  Cardiovascular:      Rate and Rhythm: Normal rate and regular rhythm  Pulses: no weak pulses          Dorsalis pedis pulses are 2+ on the right side and 2+ on the left side  Posterior tibial pulses are 2+ on the right side and 2+ on the left side  Heart sounds: Normal heart sounds  Comments: Carotids: no bruits  Ext: no edema  Pulmonary:      Effort: Pulmonary effort is normal  No respiratory distress  Breath sounds:  No wheezing or rales  Feet:      Right foot:      Skin integrity: No ulcer, skin breakdown, erythema, warmth, callus or dry skin  Left foot:      Skin integrity: No ulcer, skin breakdown, erythema, warmth, callus or dry skin  Psychiatric:         Behavior: Behavior normal          Thought Content:  Thought content normal        Eliud Acevedo, DO

## 2022-10-10 NOTE — ASSESSMENT & PLAN NOTE
Lab Results   Component Value Date    HGBA1C 6 3 (H) 04/13/2022   Last A1c from April 2022 was 6 3%  Continue diet control  Foot exam performed today  Patient is due for labs    Will call with results

## 2022-10-10 NOTE — ASSESSMENT & PLAN NOTE
Doing well on red yeast rice and Omega 3  Continue low-fat diet exercise  Patient is due for labs    Will call with results

## 2022-10-10 NOTE — ASSESSMENT & PLAN NOTE
Lab Results   Component Value Date    EGFR 40 04/13/2022    EGFR 53 10/26/2018    EGFR >60 0 02/02/2017    CREATININE 1 58 (H) 04/13/2022    CREATININE 1 29 10/26/2018    CREATININE 1 49 (H) 03/12/2018   Last creatinine 40  Patient is due for labs    Will call with results

## 2022-10-10 NOTE — ASSESSMENT & PLAN NOTE
Status post right total hip arthroplasty by Dr Vignesh Adrian    Follow-up with orthopedics as directed

## 2022-10-10 NOTE — ASSESSMENT & PLAN NOTE
History of RCA and circumflex disease  Doing well without chest pain or shortness of breath  Continue diet exercise    Continue follow-up with cardiologist as directed

## 2022-11-25 DIAGNOSIS — N40.1 BENIGN PROSTATIC HYPERPLASIA WITH LOWER URINARY TRACT SYMPTOMS, SYMPTOM DETAILS UNSPECIFIED: ICD-10-CM

## 2022-11-25 RX ORDER — FINASTERIDE 5 MG/1
5 TABLET, FILM COATED ORAL DAILY
Qty: 90 TABLET | Refills: 1 | Status: SHIPPED | OUTPATIENT
Start: 2022-11-25

## 2022-12-13 DIAGNOSIS — I10 BENIGN ESSENTIAL HYPERTENSION: ICD-10-CM

## 2022-12-13 RX ORDER — AMLODIPINE BESYLATE 2.5 MG/1
2.5 TABLET ORAL
Qty: 90 TABLET | Refills: 1 | Status: SHIPPED | OUTPATIENT
Start: 2022-12-13

## 2022-12-14 DIAGNOSIS — I10 BENIGN ESSENTIAL HYPERTENSION: ICD-10-CM

## 2022-12-14 RX ORDER — VALSARTAN AND HYDROCHLOROTHIAZIDE 320; 25 MG/1; MG/1
1 TABLET, FILM COATED ORAL
Qty: 90 TABLET | Refills: 1 | Status: SHIPPED | OUTPATIENT
Start: 2022-12-14

## 2023-04-03 ENCOUNTER — RA CDI HCC (OUTPATIENT)
Dept: OTHER | Facility: HOSPITAL | Age: 83
End: 2023-04-03

## 2023-04-03 DIAGNOSIS — N40.1 BENIGN PROSTATIC HYPERPLASIA WITH LOWER URINARY TRACT SYMPTOMS, SYMPTOM DETAILS UNSPECIFIED: ICD-10-CM

## 2023-04-03 RX ORDER — TAMSULOSIN HYDROCHLORIDE 0.4 MG/1
0.4 CAPSULE ORAL 2 TIMES DAILY
Qty: 180 CAPSULE | Refills: 1 | Status: SHIPPED | OUTPATIENT
Start: 2023-04-03

## 2023-04-03 NOTE — PROGRESS NOTES
[facility]  coding opportunities       Chart reviewed, no opportunity found: CHART REVIEWED, NO OPPORTUNITY FOUND   This is a reminder to address ALL HCC (risk adjustment) codes as found on active problem list for 2023 as patient scores reset to zero DIANA       Patients Insurance     Medicare Insurance: [employer]

## 2023-04-04 DIAGNOSIS — R07.9 CHEST PAIN, UNSPECIFIED TYPE: ICD-10-CM

## 2023-04-04 RX ORDER — NITROGLYCERIN 0.4 MG/1
0.4 TABLET SUBLINGUAL
Qty: 5 TABLET | Refills: 2 | Status: SHIPPED | OUTPATIENT
Start: 2023-04-04

## 2023-04-10 PROBLEM — R00.2 PALPITATIONS: Status: ACTIVE | Noted: 2023-04-10

## 2023-04-10 PROBLEM — M20.42 HAMMERTOE OF LEFT FOOT: Status: ACTIVE | Noted: 2023-04-10

## 2023-04-17 PROBLEM — G31.84 MCI (MILD COGNITIVE IMPAIRMENT): Status: ACTIVE | Noted: 2018-04-26

## 2023-05-04 ENCOUNTER — OFFICE VISIT (OUTPATIENT)
Age: 83
End: 2023-05-04

## 2023-05-04 DIAGNOSIS — G31.84 MCI (MILD COGNITIVE IMPAIRMENT): ICD-10-CM

## 2023-05-04 NOTE — PROGRESS NOTES
[address]  [phone]    Louisa Randhawa was here today for [employer] comprehensive test  It took the patient 68 minutes to complete

## 2023-05-09 NOTE — PROGRESS NOTES
Bobby Peters [address], [address] [place] [address], [address] [place] [address]  [phone]    Care Conference    NAME: Handy Kinsey  AGE: [de-identified] y o  SEX: male  YOB: 1940  DATE OF ASSESSMENT: 4/17/23   DATE OF CONFERENCE: 5/15/23    Family Present: Thomas Ramirez,   Staff Present: Dr Alok Mccrary, MSW, Dr Timur Guo (neurology resident)    Patient / Family Goals of Care: Review cognitive decline and associated symptoms, discuss treatment options and care planning  Medical Concerns (Current/Historical): Urinary incontinence, unspecified type, Benign prostatic hyperplasia, Stage 3 chronic kidney disease, unspecified whether stage 3a or 3b CKD ([facility] [address] ), Benign essential hypertension, Arteriosclerosis of coronary artery      Geriatric Syndromes/Age Related Syndromes: MCI (mild cognitive impairment)    Neuropsychological  • Mild Cognitive Impairment  o Juan Miguel Cognitive Assessment: 21/30 with deficits in visual-spatial, attention and delayed recall  o Geriatric Depression Screen: 2/15  o NeuroTrax:  - 90 6 (global cognitive score)  - 81 4 (memory)   - 98 4 (executive function)  - 95 8 (attention)   - 93 4 (information processing speed)  - 74 8 (visual spatial)  - 84 7 (verbal function)  - 105 7 (motor skills)    • Remain active physically (atleast 30 minutes of walking/daily), mentally and socially (info about senior center discussed and SW will give patients that info)  • Pharmaceutical and non-pharmaceutical interventions discussed  • Engage in cognitively challenging exercises such as crosswords and puzzles  • Maintain chronic conditions under control and continue following with PCP  • Caregiver burnout discussed, caregiver support group info provided  • Repeat cognitive assessment in 6 months    Diagnostic Studies  • Review of bloodwork from 5/10/23: TSH within normal and B12 276   CMP and CBC reviewed   • Review of MRI 11/11/18 generalized volume loss    Physical Finding Impacting Function   Timed Up and Go Test: 10 seconds   Activities of Daily Living: Independent   Instrumental Activities of Daily Living: needs assistance with some     • Encourage appropriate footwear at all times  • Review fall risk prevention tips and adjust within the home environment as needed    Medications Reviewed   • Medications seem appropriate for present conditions  • Check with PCP before using over the counter medications  • Avoid over the counter medications that can affect cognition (e g , Benadryl, Tylenol PM)   • Can use melatonin 3mg for sleep assistance  • Avoid NSAIDs due to risk of GI bleed and renal impairment    Other Findings   Overall health  • BMI: 28 63 kg/m2  • Maintain well-balanced diet  • Continue following with primary care physician regularly for vaccinations and regular f/u  Urinary incontinence, unspecified type  • Currently on oxybutynin  • Continued management by primary care provider appreciated  Benign prostatic hyperplasia  · Currently on tamsulosin and finasteride  • Follow-up with urology  • Continued management per primary care provider appreciated  Stage 3 chronic kidney disease, unspecified whether stage 3a or 3b CKD (HCC)  • Adequate hydration  • Avoid nephrotoxic agents  • Recommend patient avoid NSAIDs  • Most recent GFR measured at 40  • Continued management by primary care provider appreciated  Benign essential hypertension  • Blood pressure is controlled with current regimen  Patient is being managed on amlodipine, and Diovan HCT  • Continue following with PCP  Arteriosclerosis of coronary artery  • Currently on aspirin  • Patient currently managed on amlodipine, aspirin, nitroglycerin, and losartan-hydrochlorothiazide  • Maintain blood pressure under control and monitor lipid profile  • Continued management by primary care provider appreciated  Mixed hyperlipidemia   · Recommend obtaining a new fasting lipid panel    · Patient currently on red yeast rice extract  • Continued management by primary care physician appreciated  Arthritis  · Tylenol 500mg every 6 hours as needed  · Can also use for headaches  · Advised caution w/ tylenol PM since has benadryl   Vit D deficiency   • Continue vit D supplement   Vit B12 Deficiency  - recommended Vitamin B12 supplement 1000 mcg      Recommended Health Maintenance   Immunizations, if not contraindicated:   • Influenza vaccine yearly  • Pneumo vaccine every 5 years (65 years and over)  • Shingles vaccine  • COVID-19 vaccine    Social / Safety Concerns  • Consider a [address] for positive socialization, physical exercise, cognitive stimulation  • Stay in touch with family and friends  • Plan self-care activities for your mental well-being each week  • Consider volunteering through [employer] ([phone]) or [employer]  • Recommend review of fall risk prevention tips  • Recommend use of fall precautions including fall alert device  • Consider assistance for medication administration such as blister packaging or use of an automated pill dispenser  • Consider contacting your local [facility] for possible eligible programs such as OPTIONS, Caregiver Support Program, or [address] [place] [address] [address] updated advance directives and provide a copy to your primary care provider  • Consider caregiver support groups and educational resources through the Alzheimer's Association; access Alzheimer's Association 24/7 Helpline at [phone]  ? [facility] does offer a monthly caregiver support group  If interested, please speak with a   • Utilize reorientation and redirection as needed (dependent on situation)  • Educational information provided    Patient and family verbalized understanding of above care plan  6 month F/U    For care coordination purposes, this care plan will be shared with your primary care provider   With any questions, please contact our office at [phone]  Chief Complaint              Chief Complaint   Patient presents with   • Follow-up       Family Conference     Patient is here today for family conference to discuss results and recommendations for memory loss  HPI:  We had the pleasure of evaluating Shorty James who is a [de-identified] y o  male in Geriatric follow up today for care conference  He lives with wife   Mr Trace Levy is in the office with his wife  Last OV was 04/17/2023  Patient seen with Dr Kiara Earl and Leela Mantilla MSW    patient did Neurotrax 05/04/2023 and took 68 minutes   last [facility] 04/17/23, MOCA 21/30 with deficits in visual-spatial, attention and delayed recall  Patient was requiring assistance w/ some IADLS as of 33/24/3956 OV  Since last visit, no recent falls, trips, sicknesses, hospitalization, or medication changes  He is open to walking 1 mile/day, even in the evening and advised to be careful if too hot/cold during the day  Patient does note they are less active and more lazy since the pandemic and watch TV most of the days which they realize needs to improve  Labs   05/10/23  Vit B12: 276 (low)   Folate: 12 0 WNL  TSH 0 824 WNL    • NeuroTrax 05/04/23  • 90 6 (global cognitive score)  • 81 4 (memory)   • 98 4 (executive function)  • 95 8 (attention)   • 93 4 (information processing speed)  • 74 8 (visual spatial)  • 84 7 (verbal function)  • 105 7 (motor skills)      ROS: Review of Systems   Constitutional: Negative for chills and fever  HENT: Negative for ear pain and sore throat  Eyes: Negative for pain and visual disturbance  Respiratory: Negative for cough and shortness of breath  Cardiovascular: Negative for chest pain and palpitations  Gastrointestinal: Negative for abdominal pain and vomiting  Genitourinary: Negative for dysuria and hematuria  Musculoskeletal: Negative for arthralgias and back pain  Skin: Negative for color change and rash  Neurological: Negative for seizures and syncope  All other systems reviewed and are negative  Allergies:   No Known Allergies    Medications:      Current Outpatient Medications:   •  acetaminophen (TYLENOL) 325 mg tablet, Take 650 mg by mouth every 6 (six) hours as needed for mild pain, Disp: , Rfl:   •  amLODIPine (NORVASC) 2 5 mg tablet, Take 1 tablet (2 5 mg total) by mouth daily at bedtime, Disp: 90 tablet, Rfl: 1  •  aspirin (ECOTRIN) 325 mg EC tablet, Take 1 tablet by mouth, Disp: , Rfl:   •  cholecalciferol (VITAMIN D3) 1,000 units tablet, Take by mouth, Disp: , Rfl:   •  clindamycin (CLEOCIN) 300 MG capsule, , Disp: , Rfl:   •  finasteride (PROSCAR) 5 mg tablet, Take 1 tablet (5 mg total) by mouth daily, Disp: 90 tablet, Rfl: 1  •  fluticasone (FLONASE) 50 mcg/act nasal spray, 2 sprays into each nostril daily for 30 days (Patient not taking: Reported on 4/17/2023), Disp: 1 Bottle, Rfl: 1  •  ketoconazole (NIZORAL) 2 % cream, APPLY TO AFFECTED AREAS EVERY DAY (Patient not taking: Reported on 4/17/2023), Disp: , Rfl: 0  •  nitroglycerin (NITROSTAT) 0 4 mg SL tablet, Place 1 tablet (0 4 mg total) under the tongue every 5 (five) minutes as needed for chest pain, Disp: 5 tablet, Rfl: 2  •  Omega-3 Fatty Acids (OMEGA 3 PO), Take 2 capsules by mouth daily (Patient not taking: Reported on 7/28/2022), Disp: , Rfl:   •  oxybutynin (DITROPAN-XL) 10 MG 24 hr tablet, Take 1 tablet (10 mg total) by mouth daily at bedtime, Disp: 90 tablet, Rfl: 3  •  Red Yeast Rice Extract (RED YEAST RICE PO), Take 2 capsules by mouth daily (Patient not taking: Reported on 7/28/2022), Disp: , Rfl:   •  tamsulosin (FLOMAX) 0 4 mg, Take 1 capsule (0 4 mg total) by mouth 2 (two) times a day, Disp: 180 capsule, Rfl: 1  •  valsartan-hydrochlorothiazide (DIOVAN-HCT) 320-25 MG per tablet, Take 1 tablet by mouth daily at bedtime, Disp: 90 tablet, Rfl: 1    Vitals:  T97 5, P68, /70, weight 183 LB     Physical Exam  Vitals reviewed  Constitutional:       Appearance: Normal appearance  He is obese  HENT:      Head: Normocephalic  Mouth/Throat:      Mouth: Mucous membranes are moist    Eyes:      Conjunctiva/sclera: Conjunctivae normal    Cardiovascular:      Rate and Rhythm: Normal rate and regular rhythm  Heart sounds: Normal heart sounds  No murmur heard  Pulmonary:      Effort: Pulmonary effort is normal  No respiratory distress  Breath sounds: Normal breath sounds  Abdominal:      General: Bowel sounds are normal       Palpations: Abdomen is soft  Tenderness: There is no abdominal tenderness  Musculoskeletal:      Cervical back: Neck supple  Skin:     General: Skin is warm and dry  Neurological:      General: No focal deficit present  Mental Status: He is alert and oriented to person, place, and time  Cranial Nerves: No cranial nerve deficit  Sensory: No sensory deficit  Motor: No weakness        Coordination: Coordination normal       Gait: Gait normal    Psychiatric:         Mood and Affect: Mood normal

## 2023-05-10 ENCOUNTER — APPOINTMENT (OUTPATIENT)
Age: 83
End: 2023-05-10

## 2023-05-10 DIAGNOSIS — Z12.5 SCREENING FOR PROSTATE CANCER: ICD-10-CM

## 2023-05-10 DIAGNOSIS — N18.30 STAGE 3 CHRONIC KIDNEY DISEASE, UNSPECIFIED WHETHER STAGE 3A OR 3B CKD (HCC): ICD-10-CM

## 2023-05-10 DIAGNOSIS — I10 BENIGN ESSENTIAL HYPERTENSION: ICD-10-CM

## 2023-05-10 DIAGNOSIS — E11.22 TYPE 2 DIABETES MELLITUS WITH CHRONIC KIDNEY DISEASE, WITHOUT LONG-TERM CURRENT USE OF INSULIN, UNSPECIFIED CKD STAGE (HCC): ICD-10-CM

## 2023-05-10 DIAGNOSIS — G31.84 MCI (MILD COGNITIVE IMPAIRMENT): ICD-10-CM

## 2023-05-10 DIAGNOSIS — E78.2 MIXED HYPERLIPIDEMIA: ICD-10-CM

## 2023-05-10 LAB
ALBUMIN SERPL BCP-MCNC: 3.6 G/DL (ref 3.5–5)
ALP SERPL-CCNC: 40 U/L (ref 46–116)
ALT SERPL W P-5'-P-CCNC: 21 U/L (ref 12–78)
ANION GAP SERPL CALCULATED.3IONS-SCNC: 2 MMOL/L (ref 4–13)
AST SERPL W P-5'-P-CCNC: 17 U/L (ref 5–45)
BASOPHILS # BLD AUTO: 0.03 THOUSANDS/ÂΜL (ref 0–0.1)
BASOPHILS NFR BLD AUTO: 1 % (ref 0–1)
BILIRUB SERPL-MCNC: 0.58 MG/DL (ref 0.2–1)
BUN SERPL-MCNC: 39 MG/DL (ref 5–25)
CALCIUM SERPL-MCNC: 10.7 MG/DL (ref 8.3–10.1)
CHLORIDE SERPL-SCNC: 112 MMOL/L (ref 96–108)
CHOLEST SERPL-MCNC: 162 MG/DL
CO2 SERPL-SCNC: 25 MMOL/L (ref 21–32)
CREAT SERPL-MCNC: 1.54 MG/DL (ref 0.6–1.3)
EOSINOPHIL # BLD AUTO: 0.19 THOUSAND/ÂΜL (ref 0–0.61)
EOSINOPHIL NFR BLD AUTO: 4 % (ref 0–6)
ERYTHROCYTE [DISTWIDTH] IN BLOOD BY AUTOMATED COUNT: 12.6 % (ref 11.6–15.1)
EST. AVERAGE GLUCOSE BLD GHB EST-MCNC: 128 MG/DL
FOLATE SERPL-MCNC: 12 NG/ML (ref 3.1–17.5)
GFR SERPL CREATININE-BSD FRML MDRD: 41 ML/MIN/1.73SQ M
GLUCOSE P FAST SERPL-MCNC: 131 MG/DL (ref 65–99)
HBA1C MFR BLD: 6.1 %
HCT VFR BLD AUTO: 43.5 % (ref 36.5–49.3)
HDLC SERPL-MCNC: 56 MG/DL
HGB BLD-MCNC: 15.2 G/DL (ref 12–17)
IMM GRANULOCYTES # BLD AUTO: 0.03 THOUSAND/UL (ref 0–0.2)
IMM GRANULOCYTES NFR BLD AUTO: 1 % (ref 0–2)
LDLC SERPL CALC-MCNC: 91 MG/DL (ref 0–100)
LYMPHOCYTES # BLD AUTO: 1.09 THOUSANDS/ÂΜL (ref 0.6–4.47)
LYMPHOCYTES NFR BLD AUTO: 21 % (ref 14–44)
MCH RBC QN AUTO: 32.3 PG (ref 26.8–34.3)
MCHC RBC AUTO-ENTMCNC: 34.9 G/DL (ref 31.4–37.4)
MCV RBC AUTO: 93 FL (ref 82–98)
MONOCYTES # BLD AUTO: 0.52 THOUSAND/ÂΜL (ref 0.17–1.22)
MONOCYTES NFR BLD AUTO: 10 % (ref 4–12)
NEUTROPHILS # BLD AUTO: 3.4 THOUSANDS/ÂΜL (ref 1.85–7.62)
NEUTS SEG NFR BLD AUTO: 63 % (ref 43–75)
NRBC BLD AUTO-RTO: 0 /100 WBCS
PLATELET # BLD AUTO: 190 THOUSANDS/UL (ref 149–390)
PMV BLD AUTO: 10.9 FL (ref 8.9–12.7)
POTASSIUM SERPL-SCNC: 4.4 MMOL/L (ref 3.5–5.3)
PROT SERPL-MCNC: 6.9 G/DL (ref 6.4–8.4)
PSA SERPL-MCNC: 0.9 NG/ML (ref 0–4)
RBC # BLD AUTO: 4.7 MILLION/UL (ref 3.88–5.62)
SODIUM SERPL-SCNC: 139 MMOL/L (ref 135–147)
TRIGL SERPL-MCNC: 77 MG/DL
TSH SERPL DL<=0.05 MIU/L-ACNC: 0.82 UIU/ML (ref 0.45–4.5)
VIT B12 SERPL-MCNC: 276 PG/ML (ref 100–900)
WBC # BLD AUTO: 5.26 THOUSAND/UL (ref 4.31–10.16)

## 2023-05-11 DIAGNOSIS — E11.22 TYPE 2 DIABETES MELLITUS WITH CHRONIC KIDNEY DISEASE, WITHOUT LONG-TERM CURRENT USE OF INSULIN, UNSPECIFIED CKD STAGE (HCC): Primary | ICD-10-CM

## 2023-05-15 ENCOUNTER — OFFICE VISIT (OUTPATIENT)
Age: 83
End: 2023-05-15

## 2023-05-15 VITALS
HEIGHT: 68 IN | DIASTOLIC BLOOD PRESSURE: 70 MMHG | HEART RATE: 68 BPM | TEMPERATURE: 97.5 F | OXYGEN SATURATION: 98 % | SYSTOLIC BLOOD PRESSURE: 128 MMHG | BODY MASS INDEX: 27.83 KG/M2 | WEIGHT: 183.6 LBS

## 2023-05-15 DIAGNOSIS — M16.11 OSTEOARTHRITIS OF RIGHT HIP, UNSPECIFIED OSTEOARTHRITIS TYPE: ICD-10-CM

## 2023-05-15 DIAGNOSIS — R32 URINARY INCONTINENCE, UNSPECIFIED TYPE: ICD-10-CM

## 2023-05-15 DIAGNOSIS — Z71.89 ADVANCE CARE PLANNING: ICD-10-CM

## 2023-05-15 DIAGNOSIS — G31.84 MCI (MILD COGNITIVE IMPAIRMENT): Primary | ICD-10-CM

## 2023-05-15 DIAGNOSIS — E55.9 VITAMIN D DEFICIENCY: ICD-10-CM

## 2023-05-15 DIAGNOSIS — I10 BENIGN ESSENTIAL HYPERTENSION: ICD-10-CM

## 2023-05-15 DIAGNOSIS — N18.30 STAGE 3 CHRONIC KIDNEY DISEASE, UNSPECIFIED WHETHER STAGE 3A OR 3B CKD (HCC): ICD-10-CM

## 2023-05-15 DIAGNOSIS — E78.2 MIXED HYPERLIPIDEMIA: ICD-10-CM

## 2023-05-15 DIAGNOSIS — N40.1 BENIGN PROSTATIC HYPERPLASIA WITH LOWER URINARY TRACT SYMPTOMS, SYMPTOM DETAILS UNSPECIFIED: ICD-10-CM

## 2023-05-15 DIAGNOSIS — I25.10 ARTERIOSCLEROSIS OF CORONARY ARTERY: ICD-10-CM

## 2023-05-15 DIAGNOSIS — Z73.0 BURNOUT OF CAREGIVER: ICD-10-CM

## 2023-05-16 NOTE — PROGRESS NOTES
Hilario Swain [facility]  [address], [address], [address]  [phone]    Care Conference: Resources Provided    LSW participated in today's conference and provided the following resources, along with a copy of care plan:  [address]  - 10 Ways to [place]  - Memory loss ladder  - Alzheimer's Association information sheet on Mild Cognitive Impairment diagnosis    Caregiver Support  - Flyer for [address] (meeting 2x per month by [employer])  - Alzheimer's Association Rx Pad (guide to website and 24/7 helpline, 4-727.465.9065)    Safety  - [facility] fall prevention / home safety [address] [facility] contains information on home care agencies, adult day centers, higher levels of care, and more  - [address] [place] [address]: Tips from the [employer] on Aging

## 2023-06-13 DIAGNOSIS — I10 BENIGN ESSENTIAL HYPERTENSION: ICD-10-CM

## 2023-06-13 RX ORDER — AMLODIPINE BESYLATE 2.5 MG/1
2.5 TABLET ORAL
Qty: 90 TABLET | Refills: 1 | Status: SHIPPED | OUTPATIENT
Start: 2023-06-13

## 2023-06-14 ENCOUNTER — OFFICE VISIT (OUTPATIENT)
Dept: PODIATRY | Facility: CLINIC | Age: 83
End: 2023-06-14
Payer: COMMERCIAL

## 2023-06-14 ENCOUNTER — APPOINTMENT (OUTPATIENT)
Dept: RADIOLOGY | Facility: CLINIC | Age: 83
End: 2023-06-14
Payer: COMMERCIAL

## 2023-06-14 VITALS
HEART RATE: 71 BPM | SYSTOLIC BLOOD PRESSURE: 145 MMHG | HEIGHT: 68 IN | BODY MASS INDEX: 28.19 KG/M2 | WEIGHT: 186 LBS | DIASTOLIC BLOOD PRESSURE: 71 MMHG

## 2023-06-14 DIAGNOSIS — E11.9 CONTROLLED TYPE 2 DIABETES MELLITUS WITHOUT COMPLICATION, WITHOUT LONG-TERM CURRENT USE OF INSULIN (HCC): Primary | ICD-10-CM

## 2023-06-14 DIAGNOSIS — M20.42 HAMMERTOE OF LEFT FOOT: ICD-10-CM

## 2023-06-14 DIAGNOSIS — M20.12 ACQUIRED HALLUX VALGUS OF LEFT FOOT: ICD-10-CM

## 2023-06-14 DIAGNOSIS — M20.11 ACQUIRED HALLUX VALGUS OF RIGHT FOOT: ICD-10-CM

## 2023-06-14 PROCEDURE — 73630 X-RAY EXAM OF FOOT: CPT

## 2023-06-14 PROCEDURE — 99203 OFFICE O/P NEW LOW 30 MIN: CPT | Performed by: PODIATRIST

## 2023-06-14 NOTE — PROGRESS NOTES
Assessment/Plan:    Discussed principles of diabetic foot care  Vascular status is within normal limits and sensorium is intact  Patient has severe bunion deformities both feet and a dislocated hammertoe affecting the left second toe  It is a second toe that is uncomfortable  The great toe is deviated laterally and the second toe rests above it  Discussed treatment options in detail  Surgical intervention is needed to correct the left foot deformities  The surgeries would include either hallux valgus correction left foot with hammertoe correction left second toe or simply amputation of the left second toe  Patient to consider these options  He was referred for x-rays of the left foot  No problem-specific Assessment & Plan notes found for this encounter  Diagnoses and all orders for this visit:    Controlled type 2 diabetes mellitus without complication, without long-term current use of insulin ([facility] [address] )    Hammertoe of left foot  -     Ambulatory Referral to Podiatry  -     X-ray foot left 3+ views; Future    Acquired hallux valgus of right foot    Acquired hallux valgus of left foot  -     X-ray foot left 3+ views; Future          Subjective:      Patient ID: Mahesh Zuñiga is a 82 y o  male  HPI     Patient, an 82year-old diet-controlled diabetic presents for examination and care  Patient states that he feels that he is prediabetic as his blood sugar is always been under good control  His chief complaint is a intermittently painful hammertoe deformity of the left second toe  Patient also has a right second toe hammertoe but it is not uncomfortable  Patient also has large bunions affecting each foot also not uncomfortable  I personally reviewed an A1c dated 5/10/2023  It was 6 1  I personally reviewed an A1c dated 4/13/2022  It was 6 3      The following portions of the patient's history were reviewed and updated as appropriate: allergies, current medications, past family history, past "medical history, past social history, past surgical history and problem list     Review of Systems   Cardiovascular:        Coronary artery disease   Musculoskeletal: Positive for arthralgias and gait problem  Neurological: Negative for numbness  Psychiatric/Behavioral: Negative  Objective:      /71   Pulse 71   Ht 5' 7 8\" (1 722 m)   Wt 84 4 kg (186 lb)   BMI 28 45 kg/m²          Physical Exam  Cardiovascular:      Pulses: no weak pulses          Dorsalis pedis pulses are 2+ on the right side and 2+ on the left side  Posterior tibial pulses are 2+ on the right side and 2+ on the left side  Musculoskeletal:      Left foot: Bony tenderness present  Feet:      Right foot:      Skin integrity: No ulcer, skin breakdown, erythema, warmth, callus or dry skin  Left foot:      Skin integrity: No ulcer, skin breakdown, erythema, warmth, callus or dry skin  Diabetic Foot Exam    Patient's shoes and socks removed  Right Foot/Ankle   Right Foot Inspection  Skin Exam: skin normal and skin intact  No dry skin, no warmth, no callus, no erythema, no maceration, no abnormal color, no pre-ulcer, no ulcer and no callus  Toe Exam: right toe deformity  Sensory   Vibration: intact  Proprioception: intact      Vascular  Capillary refills: < 3 seconds  The right DP pulse is 2+  The right PT pulse is 2+  Right Toe  - Comprehensive Exam  Ecchymosis: none  Arch: normal  Hammertoes: second toe  Claw Toes: absent  Swelling: none   Tenderness: none         Left Foot/Ankle  Left Foot Inspection  Skin Exam: skin normal and skin intact  No dry skin, no warmth, no erythema, no maceration, normal color, no pre-ulcer, no ulcer and no callus  Toe Exam: left toe deformity  Sensory   Vibration: intact  Proprioception: intact  Monofilament testing: intact    Vascular  Capillary refills: < 3 seconds  The left DP pulse is 2+  The left PT pulse is 2+       Left Toe  - Comprehensive " Exam  Ecchymosis: none  Arch: normal  Hammertoes: second toe  Claw toes: absent  Swelling: none   Tenderness: bony tenderness           Assign Risk Category  Deformity present  No loss of protective sensation  No weak pulses  Risk: 0

## 2023-07-31 DIAGNOSIS — I10 BENIGN ESSENTIAL HYPERTENSION: ICD-10-CM

## 2023-07-31 RX ORDER — VALSARTAN AND HYDROCHLOROTHIAZIDE 320; 25 MG/1; MG/1
1 TABLET, FILM COATED ORAL
Qty: 90 TABLET | Refills: 1 | Status: SHIPPED | OUTPATIENT
Start: 2023-07-31

## 2023-09-05 DIAGNOSIS — N40.1 BENIGN PROSTATIC HYPERPLASIA WITH LOWER URINARY TRACT SYMPTOMS, SYMPTOM DETAILS UNSPECIFIED: ICD-10-CM

## 2023-09-05 RX ORDER — FINASTERIDE 5 MG/1
5 TABLET, FILM COATED ORAL DAILY
Qty: 90 TABLET | Refills: 1 | Status: SHIPPED | OUTPATIENT
Start: 2023-09-05

## 2023-09-05 NOTE — TELEPHONE ENCOUNTER
Received fax thru [employer] for med refill    Finasteride 5 mg tab  Take 1 daily  #90  [facility]

## 2023-10-09 ENCOUNTER — RA CDI HCC (OUTPATIENT)
Dept: OTHER | Facility: HOSPITAL | Age: 83
End: 2023-10-09

## 2023-10-09 NOTE — PROGRESS NOTES
[address] [facility] coding opportunities       Chart reviewed, no opportunity found: CHART REVIEWED, NO OPPORTUNITY FOUND        Patients Insurance     Medicare Insurance: [employer]

## 2023-10-16 ENCOUNTER — OFFICE VISIT (OUTPATIENT)
Dept: FAMILY MEDICINE CLINIC | Facility: CLINIC | Age: 83
End: 2023-10-16
Payer: COMMERCIAL

## 2023-10-16 VITALS
TEMPERATURE: 98.1 F | HEART RATE: 74 BPM | OXYGEN SATURATION: 98 % | HEIGHT: 67 IN | SYSTOLIC BLOOD PRESSURE: 124 MMHG | BODY MASS INDEX: 29.91 KG/M2 | WEIGHT: 190.6 LBS | DIASTOLIC BLOOD PRESSURE: 82 MMHG

## 2023-10-16 DIAGNOSIS — E11.22 TYPE 2 DIABETES MELLITUS WITH CHRONIC KIDNEY DISEASE, WITHOUT LONG-TERM CURRENT USE OF INSULIN, UNSPECIFIED CKD STAGE (HCC): ICD-10-CM

## 2023-10-16 DIAGNOSIS — I10 BENIGN ESSENTIAL HYPERTENSION: ICD-10-CM

## 2023-10-16 DIAGNOSIS — Z00.00 MEDICARE ANNUAL WELLNESS VISIT, SUBSEQUENT: ICD-10-CM

## 2023-10-16 DIAGNOSIS — R32 URINARY INCONTINENCE, UNSPECIFIED TYPE: ICD-10-CM

## 2023-10-16 DIAGNOSIS — R53.83 FATIGUE, UNSPECIFIED TYPE: ICD-10-CM

## 2023-10-16 DIAGNOSIS — N40.1 BENIGN PROSTATIC HYPERPLASIA WITH LOWER URINARY TRACT SYMPTOMS, SYMPTOM DETAILS UNSPECIFIED: ICD-10-CM

## 2023-10-16 DIAGNOSIS — E78.2 MIXED HYPERLIPIDEMIA: ICD-10-CM

## 2023-10-16 DIAGNOSIS — I25.10 ARTERIOSCLEROSIS OF CORONARY ARTERY: Primary | ICD-10-CM

## 2023-10-16 DIAGNOSIS — M16.11 OSTEOARTHRITIS OF RIGHT HIP, UNSPECIFIED OSTEOARTHRITIS TYPE: ICD-10-CM

## 2023-10-16 DIAGNOSIS — N18.30 STAGE 3 CHRONIC KIDNEY DISEASE, UNSPECIFIED WHETHER STAGE 3A OR 3B CKD (HCC): ICD-10-CM

## 2023-10-16 PROCEDURE — G0439 PPPS, SUBSEQ VISIT: HCPCS | Performed by: FAMILY MEDICINE

## 2023-10-16 PROCEDURE — 99214 OFFICE O/P EST MOD 30 MIN: CPT | Performed by: FAMILY MEDICINE

## 2023-10-16 NOTE — ASSESSMENT & PLAN NOTE
Lab Results   Component Value Date    EGFR 41 05/10/2023    EGFR 40 04/13/2022    EGFR 53 10/26/2018    CREATININE 1.54 (H) 05/10/2023    CREATININE 1.58 (H) 04/13/2022    CREATININE 1.29 10/26/2018   History of stage III chronic kidney disease. Patient is due for labs.   We will call with results

## 2023-10-16 NOTE — ASSESSMENT & PLAN NOTE
Lab Results   Component Value Date    HGBA1C 6.1 (H) 05/10/2023   History of diet-controlled diabetes. Most recently labs have been in prediabetes range.   Continue to work on diet and exercise

## 2023-10-16 NOTE — ASSESSMENT & PLAN NOTE
History of RCA and circumflex disease. Doing well without chest pain or shortness of breath.   Continue follow-up with cardiologist as directed

## 2023-10-16 NOTE — PROGRESS NOTES
Assessment and Plan:   Medicare wellness  Problem List Items Addressed This Visit     Arteriosclerosis of coronary artery - Primary    Benign essential hypertension    Benign prostatic hyperplasia    Type 2 diabetes mellitus with chronic kidney disease, without long-term current use of insulin, unspecified CKD stage (HCC)    Hyperlipidemia    Osteoarthritis of right hip    Urinary incontinence    Stage 3 chronic kidney disease, unspecified whether stage 3a or 3b CKD ([address])    Fatigue   Other Visit Diagnoses     Medicare annual wellness visit, subsequent              Depression Screening and Follow-up Plan: Patient was screened for depression during today's encounter. They screened negative with a PHQ-2 score of 0. Preventive health issues were discussed with patient, and age appropriate screening tests were ordered as noted in patient's After Visit Summary. Personalized health advice and appropriate referrals for health education or preventive services given if needed, as noted in patient's After Visit Summary. History of Present Illness:     Patient presents for a Medicare Wellness Visit    HPI   Patient Care Team:  Reymundo López DO as PCP - General  MD Camilla Espinosa PA-MD Uriel Nicole MD     Review of Systems:     Review of Systems     Problem List:     Patient Active Problem List   Diagnosis   • Arteriosclerosis of coronary artery   • Benign essential hypertension   • Benign prostatic hyperplasia   • Type 2 diabetes mellitus with chronic kidney disease, without long-term current use of insulin, unspecified CKD stage ([address])   • Hyperlipidemia   • Osteoarthritis of right hip   • Primary insomnia   • Vitamin D deficiency   • Allergic rhinitis   • MCI (mild cognitive impairment)   • Bladder calculus   • Symptomatic bradycardia   • Chronic nasal congestion   • Urinary incontinence   • Right groin pain   • Overweight (BMI 25.0-29. 9)   • Stage 3 chronic kidney disease, unspecified whether stage 3a or 3b CKD ([address])   • Hammertoe of left foot   • Palpitations   • Fatigue      Past Medical and Surgical History:     Past Medical History:   Diagnosis Date   • Abnormal blood chemistry     Last Assessed: 7/19/2013   • Abnormal glucose     Last Assessed: 11/6/2015   • Arthritis    • Bladder tumor    • BPH (benign prostatic hypertrophy)    • Cataracts, bilateral     "start of"   • Coronary artery disease     2 stents. MI x2   • Diabetes mellitus ([address])     Diet controlled pre-diabetic   • Erectile dysfunction of non-organic origin     Last Assessed: 5/9/2014   • Fatigue     Resolved: 2/23/2015   • Glaucoma     "Start of"   • Hematuria, microscopic     Last Assessed: 11/7/2016   • History of bladder stone    • Hyperlipidemia    • Hypertension    • Knee pain, bilateral     If walking far.  No assistive devices   • Myocardial infarction [facility]) 2011, 2012    x2   • Primary insomnia     Last Assessed; 7/20/2016   • Recurrent right inguinal hernia     Last Assessed: 2/23/2015   • Urinary incontinence    • Urinary urgency     Last Assessed: 10/30/2015   • Wears glasses      Past Surgical History:   Procedure Laterality Date   • BLADDER STONE REMOVAL      Laser procedure   • CARDIAC CATHETERIZATION  2003    RCA stenting and 2004 stenting to circuflex   • COLONOSCOPY     • CORONARY ANGIOPLASTY WITH STENT PLACEMENT      Has 2 stents   • CYSTOSCOPY  11/11/2015    w/ Cystolitholapaxy   • HERNIA REPAIR Bilateral 03/05/2015    Inguinal   • JOINT REPLACEMENT Bilateral     TKR   • JOINT REPLACEMENT Right     Right THR   • [place] CYSTO W/REMOVAL OF LESIONS SMALL N/A 2/8/2017    Procedure: CYSTOSCOPY WITH BIOPSIES WITH FULGURATION, INSTILLATION OF MYTOMYCIN, DIALATION OF MEATUS ;  Surgeon: Russell Lopez MD;  Location: [facility];  Service: Urology   • WISDOM TOOTH EXTRACTION        Family History:     Family History   Problem Relation Age of Onset   • No Known Problems Mother         Diabetes per [employer]   • No Known Problems Father    • Skin cancer Brother    • Diabetes Brother    • Bipolar disorder Other    • Diabetes Family    • Hypertension Family    • Substance Abuse Neg Hx    • Alcohol abuse Neg Hx       Social History:     Social History     Socioeconomic History   • Marital status: /Civil Union     Spouse name: None   • Number of children: None   • Years of education: None   • Highest education level: None   Occupational History   • Occupation: landscaping owner   retired   Tobacco Use   • Smoking status: Former     Types: Cigarettes, Pipe   • Smokeless tobacco: Never   • Tobacco comments:     Quit 50 years ago and only smoked for 1 year   Vaping Use   • Vaping Use: Never used   Substance and Sexual Activity   • Alcohol use: Yes     Alcohol/week: 2.0 standard drinks of alcohol     Types: 2 Shots of liquor per week     Comment: 2 shots of liquor weekly   • Drug use: No   • Sexual activity: None   Other Topics Concern   • None   Social History Narrative    Always uses seat belt    Daily caffeine consumption, 2-3 servings a day    Feels safe at home     Social Determinants of Health     Financial Resource Strain: Low Risk  (10/16/2023)    Overall Financial Resource Strain (CARDIA)    • Difficulty of Paying Living Expenses: Not hard at all   Food Insecurity: Not on file   Transportation Needs: No Transportation Needs (10/16/2023)    PRAPARE - Transportation    • Lack of Transportation (Medical): No    • Lack of Transportation (Non-Medical):  No   Physical Activity: Not on file   Stress: Not on file   Social Connections: Not on file   Intimate Partner Violence: Not on file   Housing Stability: Not on file      Medications and Allergies:     Current Outpatient Medications   Medication Sig Dispense Refill   • acetaminophen (TYLENOL) 325 mg tablet Take 650 mg by mouth every 6 (six) hours as needed for mild pain     • amLODIPine (NORVASC) 2.5 mg tablet Take 1 tablet (2.5 mg total) by mouth daily at bedtime 90 tablet 1   • aspirin (ECOTRIN) 325 mg EC tablet Take 1 tablet by mouth     • cholecalciferol (VITAMIN D3) 1,000 units tablet Take by mouth     • finasteride (PROSCAR) 5 mg tablet Take 1 tablet (5 mg total) by mouth daily 90 tablet 1   • nitroglycerin (NITROSTAT) 0.4 mg SL tablet Place 1 tablet (0.4 mg total) under the tongue every 5 (five) minutes as needed for chest pain 5 tablet 2   • Omega-3 Fatty Acids (OMEGA 3 PO) Take 2 capsules by mouth daily     • oxybutynin (DITROPAN-XL) 10 MG 24 hr tablet Take 1 tablet (10 mg total) by mouth daily at bedtime 90 tablet 3   • tamsulosin (FLOMAX) 0.4 mg Take 1 capsule (0.4 mg total) by mouth 2 (two) times a day 180 capsule 1   • valsartan-hydrochlorothiazide (DIOVAN-HCT) 320-25 MG per tablet Take 1 tablet by mouth daily at bedtime 90 tablet 1   • clindamycin (CLEOCIN) 300 MG capsule  (Patient not taking: Reported on 4/17/2023)     • fluticasone (FLONASE) 50 mcg/act nasal spray 2 sprays into each nostril daily for 30 days (Patient not taking: Reported on 4/17/2023) 1 Bottle 1   • ketoconazole (NIZORAL) 2 % cream APPLY TO AFFECTED AREAS EVERY DAY (Patient not taking: Reported on 4/17/2023)  0   • Red Yeast Rice Extract (RED YEAST RICE PO) Take 2 capsules by mouth daily (Patient not taking: Reported on 10/16/2023)       No current facility-administered medications for this visit.      No Known Allergies   Immunizations:     Immunization History   Administered Date(s) Administered   • COVID-19 MODERNA VACC 0.5 ML IM 03/24/2021, 04/23/2021, 12/22/2021   • INFLUENZA 09/04/2018, 09/03/2020   • Influenza Split High Dose Preservative Free IM 08/30/2017   • Influenza, high dose seasonal 0.7 mL 10/08/2021, 10/10/2022   • Pneumococcal Conjugate 13-Valent 03/20/2015   • Pneumococcal Polysaccharide PPV23 07/19/2013   • Zoster 01/01/2013   • Zoster Vaccine Recombinant 12/12/2019, 02/26/2020   • influenza, trivalent, adjuvanted 09/30/2019      Health Maintenance:         Topic Date Due   • Colorectal Cancer Screening  11/30/2020         Topic Date Due   • COVID-19 Vaccine (4 - Moderna series) 02/16/2022   • Influenza Vaccine (1) 09/01/2023      Medicare Screening Tests and Risk Assessments:     Mason Pimentel is here for his Subsequent Wellness visit. Health Risk Assessment:   Patient rates overall health as very good. Patient feels that their physical health rating is same. Patient is satisfied with their life. Eyesight was rated as same. Hearing was rated as same. Patient feels that their emotional and mental health rating is same. Patients states they are sometimes angry. Patient states they are often unusually tired/fatigued. Pain experienced in the last 7 days has been none. Patient states that he has experienced no weight loss or gain in last 6 months. Depression Screening:   PHQ-2 Score: 0      Fall Risk Screening: In the past year, patient has experienced: no history of falling in past year      Home Safety:  Patient does not have trouble with stairs inside or outside of their home. Patient has working smoke alarms and has working carbon monoxide detector. Home safety hazards include: none. Nutrition:   Current diet is Regular. Medications:   Patient is not currently taking any over-the-counter supplements. Patient is not able to manage medications. Activities of Daily Living (ADLs)/Instrumental Activities of Daily Living (IADLs):   Walk and transfer into and out of bed and chair?: Yes  Dress and groom yourself?: Yes    Bathe or shower yourself?: Yes    Feed yourself? Yes  Do your laundry/housekeeping?: Yes  Manage your money, pay your bills and track your expenses?: Yes  Make your own meals?: Yes    Do your own shopping?: Yes    Previous Hospitalizations:   Any hospitalizations or ED visits within the last 12 months?: No      Advance Care Planning:   Living will: Yes    Durable POA for healthcare:  Yes    Advanced directive: Yes      Cognitive Screening:   Provider or family/friend/caregiver concerned regarding cognition?: No    PREVENTIVE SCREENINGS      Cardiovascular Screening:    General: Screening Not Indicated and History Lipid Disorder      Diabetes Screening:     General: Screening Not Indicated and History Diabetes      Colorectal Cancer Screening:     General: Risks and Benefits Discussed      Prostate Cancer Screening:    General: Screening Not Indicated      Osteoporosis Screening:    General: Risks and Benefits Discussed      Abdominal Aortic Aneurysm (AAA) Screening:    Risk factors include: tobacco use        General: Risks and Benefits Discussed      Lung Cancer Screening:     General: Screening Not Indicated      Hepatitis C Screening:    General: Risks and Benefits Discussed    Screening, Brief Intervention, and Referral to Treatment (SBIRT)    Screening  Typical number of drinks in a day: 0  Typical number of drinks in a week: 1  Interpretation: Low risk drinking behavior. Single Item Drug Screening:  How often have you used an illegal drug (including marijuana) or a prescription medication for non-medical reasons in the past year? never    Single Item Drug Screen Score: 0  Interpretation: Negative screen for possible drug use disorder    No results found.      Physical Exam:     /82 (BP Location: Left arm, Patient Position: Sitting, Cuff Size: Adult)   Pulse 74   Temp 98.1 °F (36.7 °C)   Ht 5' 7" (1.702 m)   Wt 86.5 kg (190 lb 9.6 oz)   SpO2 98%   BMI 29.85 kg/m²     Physical Exam     Jordi Truong DO

## 2023-10-16 NOTE — ASSESSMENT & PLAN NOTE
Ongoing history of fatigue. Most likely multifactorial (age, sleep dysfunction). Patient states that he goes to bed after 11 PM every night and wakes up at 5:30 in the morning. He also takes a nap after lunch every day. I think this is reasonable as his body requires more sleep than he is getting at night so I would recommend continuing afternoon nap as needed.   Consider sleep study if symptoms worsen

## 2023-10-16 NOTE — ASSESSMENT & PLAN NOTE
Still with urinary frequency and nocturia. Patient gets up usually twice during the night, but does get improvement on tamsulosin and finasteride.

## 2023-10-16 NOTE — PATIENT INSTRUCTIONS
Medicare Preventive Visit Patient Instructions  Thank you for completing your Welcome to Medicare Visit or Medicare Annual Wellness Visit today. Your next wellness visit will be due in one year (10/16/2024). The screening/preventive services that you may require over the next 5-10 years are detailed below. Some tests may not apply to you based off risk factors and/or age. Screening tests ordered at today's visit but not completed yet may show as past due. Also, please note that scanned in results may not display below. Preventive Screenings:  Service Recommendations Previous Testing/Comments   Colorectal Cancer Screening  Colonoscopy    Fecal Occult Blood Test (FOBT)/Fecal Immunochemical Test (FIT)  Fecal DNA/Cologuard Test  Flexible Sigmoidoscopy Age: 43-73 years old   Colonoscopy: every 10 years (May be performed more frequently if at higher risk)  OR  FOBT/FIT: every 1 year  OR  Cologuard: every 3 years  OR  Sigmoidoscopy: every 5 years  Screening may be recommended earlier than age 39 if at higher risk for colorectal cancer. Also, an individualized decision between you and your healthcare provider will decide whether screening between the ages of 77-80 would be appropriate.  Colonoscopy: 11/30/2015  FOBT/FIT: Not on file  Cologuard: Not on file  Sigmoidoscopy: Not on file          Prostate Cancer Screening Individualized decision between patient and health care provider in men between ages of 53-66   Medicare will cover every 12 months beginning on the day after your 50th birthday PSA: 0.9 ng/mL     Screening Not Indicated     Hepatitis C Screening Once for adults born between 1945 and 1965  More frequently in patients at high risk for Hepatitis C Hep C Antibody: Not on file        Diabetes Screening 1-2 times per year if you're at risk for diabetes or have pre-diabetes Fasting glucose: 131 mg/dL (5/10/2023)  A1C: 6.1 % (5/10/2023)  Screening Not Indicated  History Diabetes   Cholesterol Screening Once every 5 years if you don't have a lipid disorder. May order more often based on risk factors. Lipid panel: 05/10/2023  Screening Not Indicated  History Lipid Disorder      Other Preventive Screenings Covered by Medicare:  Abdominal Aortic Aneurysm (AAA) Screening: covered once if your at risk. You're considered to be at risk if you have a family history of AAA or a male between the age of 70-76 who smoking at least 100 cigarettes in your lifetime. Lung Cancer Screening: covers low dose CT scan once per year if you meet all of the following conditions: (1) Age 48-67; (2) No signs or symptoms of lung cancer; (3) Current smoker or have quit smoking within the last 15 years; (4) You have a tobacco smoking history of at least 20 pack years (packs per day x number of years you smoked); (5) You get a written order from a healthcare provider. Glaucoma Screening: covered annually if you're considered high risk: (1) You have diabetes OR (2) Family history of glaucoma OR (3)  aged 48 and older OR (3)  American aged 72 and older  Osteoporosis Screening: covered every 2 years if you meet one of the following conditions: (1) Have a vertebral abnormality; (2) On glucocorticoid therapy for more than 3 months; (3) Have primary hyperparathyroidism; (4) On osteoporosis medications and need to assess response to drug therapy. HIV Screening: covered annually if you're between the age of 14-79. Also covered annually if you are younger than 13 and older than 72 with risk factors for HIV infection. For pregnant patients, it is covered up to 3 times per pregnancy.     Immunizations:  Immunization Recommendations   Influenza Vaccine Annual influenza vaccination during flu season is recommended for all persons aged >= 6 months who do not have contraindications   Pneumococcal Vaccine   * Pneumococcal conjugate vaccine = PCV13 (Prevnar 13), PCV15 (Vaxneuvance), PCV20 (Prevnar 20)  * Pneumococcal polysaccharide vaccine = PPSV23 (Pneumovax) Adults 50-28 yo with certain risk factors or if 69+ yo  If never received any pneumonia vaccine: recommend Prevnar 20 (PCV20)  Give PCV20 if previously received 1 dose of PCV13 or PPSV23   Hepatitis B Vaccine 3 dose series if at intermediate or high risk (ex: diabetes, end stage renal disease, liver disease)   Respiratory syncytial virus (RSV) Vaccine - COVERED BY MEDICARE PART D  * RSVPreF3 (Arexvy) CDC recommends that adults 61years of age and older may receive a single dose of RSV vaccine using shared clinical decision-making (SCDM)   Tetanus (Td) Vaccine - COST NOT COVERED BY MEDICARE PART B Following completion of primary series, a booster dose should be given every 10 years to maintain immunity against tetanus. Td may also be given as tetanus wound prophylaxis. Tdap Vaccine - COST NOT COVERED BY MEDICARE PART B Recommended at least once for all adults. For pregnant patients, recommended with each pregnancy. Shingles Vaccine (Shingrix) - COST NOT COVERED BY MEDICARE PART B  2 shot series recommended in those 19 years and older who have or will have weakened immune systems or those 50 years and older     Health Maintenance Due:      Topic Date Due   • Colorectal Cancer Screening  11/30/2020     Immunizations Due:      Topic Date Due   • COVID-19 Vaccine (4 - Moderna series) 02/16/2022   • Influenza Vaccine (1) 09/01/2023     Advance Directives   What are advance directives? Advance directives are legal documents that state your wishes and plans for medical care. These plans are made ahead of time in case you lose your ability to make decisions for yourself. Advance directives can apply to any medical decision, such as the treatments you want, and if you want to donate organs. What are the types of advance directives? There are many types of advance directives, and each state has rules about how to use them. You may choose a combination of any of the following:  Living will:   This is a written record of the treatment you want. You can also choose which treatments you do not want, which to limit, and which to stop at a certain time. This includes surgery, medicine, IV fluid, and tube feedings. Durable power of  for [facility]): This is a written record that states who you want to make healthcare choices for you when you are unable to make them for yourself. This person, called a proxy, is usually a family member or a friend. You may choose more than 1 proxy. Do not resuscitate (DNR) order:  A DNR order is used in case your heart stops beating or you stop breathing. It is a request not to have certain forms of treatment, such as CPR. A DNR order may be included in other types of advance directives. Medical directive: This covers the care that you want if you are in a coma, near death, or unable to make decisions for yourself. You can list the treatments you want for each condition. Treatment may include pain medicine, surgery, blood transfusions, dialysis, IV or tube feedings, and a ventilator (breathing machine). Values history: This document has questions about your views, beliefs, and how you feel and think about life. This information can help others choose the care that you would choose. Why are advance directives important? An advance directive helps you control your care. Although spoken wishes may be used, it is better to have your wishes written down. Spoken wishes can be misunderstood, or not followed. Treatments may be given even if you do not want them. An advance directive may make it easier for your family to make difficult choices about your care. Weight Management   Why it is important to manage your weight:  Being overweight increases your risk of health conditions such as heart disease, high blood pressure, type 2 diabetes, and certain types of cancer. It can also increase your risk for osteoarthritis, sleep apnea, and other respiratory problems.  Aim for a slow, steady weight loss. Even a small amount of weight loss can lower your risk of health problems. How to lose weight safely:  A safe and healthy way to lose weight is to eat fewer calories and get regular exercise. You can lose up about 1 pound a week by decreasing the number of calories you eat by 500 calories each day. Healthy meal plan for weight management:  A healthy meal plan includes a variety of foods, contains fewer calories, and helps you stay healthy. A healthy meal plan includes the following:  Eat whole-grain foods more often. A healthy meal plan should contain fiber. Fiber is the part of grains, fruits, and vegetables that is not broken down by your body. Whole-grain foods are healthy and provide extra fiber in your diet. Some examples of whole-grain foods are whole-wheat breads and pastas, oatmeal, brown rice, and bulgur. Eat a variety of vegetables every day. Include dark, leafy greens such as spinach, kale, noah greens, and mustard greens. Eat yellow and orange vegetables such as carrots, sweet potatoes, and winter squash. Eat a variety of fruits every day. Choose fresh or canned fruit (canned in its own juice or light syrup) instead of juice. Fruit juice has very little or no fiber. Eat low-fat dairy foods. Drink fat-free (skim) milk or 1% milk. Eat fat-free yogurt and low-fat cottage cheese. Try low-fat cheeses such as mozzarella and other reduced-fat cheeses. Choose meat and other protein foods that are low in fat. Choose beans or other legumes such as split peas or lentils. Choose fish, skinless poultry (chicken or turkey), or lean cuts of red meat (beef or pork). Before you cook meat or poultry, cut off any visible fat. Use less fat and oil. Try baking foods instead of frying them. Add less fat, such as margarine, sour cream, regular salad dressing and mayonnaise to foods. Eat fewer high-fat foods.  Some examples of high-fat foods include french fries, doughnuts, ice cream, and cakes. Eat fewer sweets. Limit foods and drinks that are high in sugar. This includes candy, cookies, regular soda, and sweetened drinks. Exercise:  Exercise at least 30 minutes per day on most days of the week. Some examples of exercise include walking, biking, dancing, and swimming. You can also fit in more physical activity by taking the stairs instead of the elevator or parking farther away from stores. Ask your healthcare provider about the best exercise plan for you. © Copyright [address] 2018 Information is for End User's use only and may not be sold, redistributed or otherwise used for commercial purposes.  All illustrations and images included in CareNotes® are the copyrighted property of [employer] or [address] [place] [address]

## 2023-10-16 NOTE — PROGRESS NOTES
Name: Rupali Campbell      : 1940      MRN: [id number]  Encounter Provider: Eduarda Dickinson DO  Encounter Date: 10/16/2023   Encounter department: [address] [place] [address]     1. Arteriosclerosis of coronary artery  Assessment & Plan:  History of RCA and circumflex disease. Doing well without chest pain or shortness of breath. Continue follow-up with cardiologist as directed      2. Type 2 diabetes mellitus with chronic kidney disease, without long-term current use of insulin, unspecified CKD stage [facility])  Assessment & Plan:    Lab Results   Component Value Date    HGBA1C 6.1 (H) 05/10/2023   History of diet-controlled diabetes. Most recently labs have been in prediabetes range. Continue to work on diet and exercise    Orders:  -     Comprehensive metabolic panel; Future; Expected date: 2024  -     Hemoglobin A1C; Future; Expected date: 2024  -     Albumin / creatinine urine ratio; Future; Expected date: 2024    3. Benign essential hypertension  Assessment & Plan:  Blood pressure well controlled on valsartan /25 and amlodipine 2.5    Orders:  -     CBC and differential; Future; Expected date: 2024  -     TSH, 3rd generation with Free T4 reflex; Future; Expected date: 2024    4. Mixed hyperlipidemia  Assessment & Plan:  Doing well on red yeast rice and omega-3. Continue low-fat diet. Continue to monitor    Orders:  -     Lipid Panel with Direct LDL reflex; Future; Expected date: 2024    5. Stage 3 chronic kidney disease, unspecified whether stage 3a or 3b CKD [facility])  Assessment & Plan:  Lab Results   Component Value Date    EGFR 41 05/10/2023    EGFR 40 2022    EGFR 53 10/26/2018    CREATININE 1.54 (H) 05/10/2023    CREATININE 1.58 (H) 2022    CREATININE 1.29 10/26/2018   History of stage III chronic kidney disease. Patient is due for labs. We will call with results    Orders:  -     Comprehensive metabolic panel;  Future; Expected date: 03/01/2024    6. Osteoarthritis of right hip, unspecified osteoarthritis type  Assessment & Plan:  Status post right total hip arthroplasty      7. Urinary incontinence, unspecified type  Assessment & Plan:  Stable on oxybutynin XL 10.      8. Benign prostatic hyperplasia with lower urinary tract symptoms, symptom details unspecified  Assessment & Plan:  Still with urinary frequency and nocturia. Patient gets up usually twice during the night, but does get improvement on tamsulosin and finasteride. 9. Fatigue, unspecified type  Assessment & Plan:  Ongoing history of fatigue. Most likely multifactorial (age, sleep dysfunction). Patient states that he goes to bed after 11 PM every night and wakes up at 5:30 in the morning. He also takes a nap after lunch every day. I think this is reasonable as his body requires more sleep than he is getting at night so I would recommend continuing afternoon nap as needed. Consider sleep study if symptoms worsen      10. Medicare annual wellness visit, subsequent        Depression Screening and Follow-up Plan: Patient was screened for depression during today's encounter. They screened negative with a PHQ-2 score of 0. Patient declines flu shot  Patient declines COVID booster  Patient declines RSV vaccination  Patient had pneumococcal vaccination  Patient had Shingrix  Recommend getting tetanus booster at pharmacy    Due for labs (CMP/A1c). We will call with results    6 months, fasting blood work prior    Subjective     Patient presents for recheck chronic medical problems today. Overall he is feeling well. He still does complain of some ongoing fatigue. Otherwise doing well. Review of Systems   Constitutional:  Positive for fatigue. Respiratory: Negative. Cardiovascular: Negative. Gastrointestinal: Negative. Genitourinary: Negative.         Past Medical History:   Diagnosis Date   • Abnormal blood chemistry     Last Assessed: 7/19/2013   • Abnormal glucose     Last Assessed: 11/6/2015   • Arthritis    • Bladder tumor    • BPH (benign prostatic hypertrophy)    • Cataracts, bilateral     "start of"   • Coronary artery disease     2 stents. MI x2   • Diabetes mellitus ([address])     Diet controlled pre-diabetic   • Erectile dysfunction of non-organic origin     Last Assessed: 5/9/2014   • Fatigue     Resolved: 2/23/2015   • Glaucoma     "Start of"   • Hematuria, microscopic     Last Assessed: 11/7/2016   • History of bladder stone    • Hyperlipidemia    • Hypertension    • Knee pain, bilateral     If walking far.  No assistive devices   • Myocardial infarction [facility]) 2011, 2012    x2   • Primary insomnia     Last Assessed; 7/20/2016   • Recurrent right inguinal hernia     Last Assessed: 2/23/2015   • Urinary incontinence    • Urinary urgency     Last Assessed: 10/30/2015   • Wears glasses      Past Surgical History:   Procedure Laterality Date   • BLADDER STONE REMOVAL      Laser procedure   • CARDIAC CATHETERIZATION  2003    RCA stenting and 2004 stenting to circuflex   • COLONOSCOPY     • CORONARY ANGIOPLASTY WITH STENT PLACEMENT      Has 2 stents   • CYSTOSCOPY  11/11/2015    w/ Cystolitholapaxy   • HERNIA REPAIR Bilateral 03/05/2015    Inguinal   • JOINT REPLACEMENT Bilateral     TKR   • JOINT REPLACEMENT Right     Right THR   • MO CYSTO W/REMOVAL OF LESIONS SMALL N/A 2/8/2017    Procedure: CYSTOSCOPY WITH BIOPSIES WITH FULGURATION, INSTILLATION OF MYTOMYCIN, DIALATION OF MEATUS ;  Surgeon: Montrell Mathews MD;  Location: [facility];  Service: Urology   • WISDOM TOOTH EXTRACTION       Family History   Problem Relation Age of Onset   • No Known Problems Mother         Diabetes per [employer]   • No Known Problems Father    • Skin cancer Brother    • Diabetes Brother    • Bipolar disorder Other    • Diabetes Family    • Hypertension Family    • Substance Abuse Neg Hx    • Alcohol abuse Neg Hx      Social History     Socioeconomic History • Marital status: /Civil Union     Spouse name: None   • Number of children: None   • Years of education: None   • Highest education level: None   Occupational History   • Occupation: landscaping owner   retired   Tobacco Use   • Smoking status: Former     Types: Cigarettes, Pipe   • Smokeless tobacco: Never   • Tobacco comments:     Quit 50 years ago and only smoked for 1 year   Vaping Use   • Vaping Use: Never used   Substance and Sexual Activity   • Alcohol use: Yes     Alcohol/week: 2.0 standard drinks of alcohol     Types: 2 Shots of liquor per week     Comment: 2 shots of liquor weekly   • Drug use: No   • Sexual activity: None   Other Topics Concern   • None   Social History Narrative    Always uses seat belt    Daily caffeine consumption, 2-3 servings a day    Feels safe at home     Social Determinants of Health     Financial Resource Strain: Low Risk  (10/16/2023)    Overall Financial Resource Strain (CARDIA)    • Difficulty of Paying Living Expenses: Not hard at all   Food Insecurity: Not on file   Transportation Needs: No Transportation Needs (10/16/2023)    PRAPARE - Transportation    • Lack of Transportation (Medical): No    • Lack of Transportation (Non-Medical):  No   Physical Activity: Not on file   Stress: Not on file   Social Connections: Not on file   Intimate Partner Violence: Not on file   Housing Stability: Not on file     Current Outpatient Medications on File Prior to Visit   Medication Sig   • acetaminophen (TYLENOL) 325 mg tablet Take 650 mg by mouth every 6 (six) hours as needed for mild pain   • amLODIPine (NORVASC) 2.5 mg tablet Take 1 tablet (2.5 mg total) by mouth daily at bedtime   • aspirin (ECOTRIN) 325 mg EC tablet Take 1 tablet by mouth   • cholecalciferol (VITAMIN D3) 1,000 units tablet Take by mouth   • finasteride (PROSCAR) 5 mg tablet Take 1 tablet (5 mg total) by mouth daily   • nitroglycerin (NITROSTAT) 0.4 mg SL tablet Place 1 tablet (0.4 mg total) under the tongue every 5 (five) minutes as needed for chest pain   • Omega-3 Fatty Acids (OMEGA 3 PO) Take 2 capsules by mouth daily   • oxybutynin (DITROPAN-XL) 10 MG 24 hr tablet Take 1 tablet (10 mg total) by mouth daily at bedtime   • tamsulosin (FLOMAX) 0.4 mg Take 1 capsule (0.4 mg total) by mouth 2 (two) times a day   • valsartan-hydrochlorothiazide (DIOVAN-HCT) 320-25 MG per tablet Take 1 tablet by mouth daily at bedtime   • clindamycin (CLEOCIN) 300 MG capsule  (Patient not taking: Reported on 4/17/2023)   • fluticasone (FLONASE) 50 mcg/act nasal spray 2 sprays into each nostril daily for 30 days (Patient not taking: Reported on 4/17/2023)   • ketoconazole (NIZORAL) 2 % cream APPLY TO AFFECTED AREAS EVERY DAY (Patient not taking: Reported on 4/17/2023)   • Red Yeast Rice Extract (RED YEAST RICE PO) Take 2 capsules by mouth daily (Patient not taking: Reported on 10/16/2023)     No Known Allergies  Immunization History   Administered Date(s) Administered   • COVID-19 MODERNA VACC 0.5 ML IM 03/24/2021, 04/23/2021, 12/22/2021   • INFLUENZA 09/04/2018, 09/03/2020   • Influenza Split High Dose Preservative Free IM 08/30/2017   • Influenza, high dose seasonal 0.7 mL 10/08/2021, 10/10/2022   • Pneumococcal Conjugate 13-Valent 03/20/2015   • Pneumococcal Polysaccharide PPV23 07/19/2013   • Zoster 01/01/2013   • Zoster Vaccine Recombinant 12/12/2019, 02/26/2020   • influenza, trivalent, adjuvanted 09/30/2019       Objective     /82 (BP Location: Left arm, Patient Position: Sitting, Cuff Size: Adult)   Pulse 74   Temp 98.1 °F (36.7 °C)   Ht 5' 7" (1.702 m)   Wt 86.5 kg (190 lb 9.6 oz)   SpO2 98%   BMI 29.85 kg/m²     Physical Exam  Cardiovascular:      Rate and Rhythm: Normal rate and regular rhythm. Heart sounds: Normal heart sounds. Comments: Carotids: no bruits  Ext: no edema  Pulmonary:      Effort: Pulmonary effort is normal. No respiratory distress. Breath sounds: No wheezing or rales.    Psychiatric: Behavior: Behavior normal.         Thought Content:  Thought content normal.       Hillary Voss, DO

## 2023-10-17 ENCOUNTER — APPOINTMENT (OUTPATIENT)
Age: 83
End: 2023-10-17
Payer: COMMERCIAL

## 2023-10-17 DIAGNOSIS — E11.22 TYPE 2 DIABETES MELLITUS WITH CHRONIC KIDNEY DISEASE, WITHOUT LONG-TERM CURRENT USE OF INSULIN, UNSPECIFIED CKD STAGE (HCC): ICD-10-CM

## 2023-10-17 LAB
ALBUMIN SERPL BCP-MCNC: 4.1 G/DL (ref 3.5–5)
ALP SERPL-CCNC: 39 U/L (ref 34–104)
ALT SERPL W P-5'-P-CCNC: 13 U/L (ref 7–52)
ANION GAP SERPL CALCULATED.3IONS-SCNC: 5 MMOL/L
AST SERPL W P-5'-P-CCNC: 12 U/L (ref 13–39)
BILIRUB SERPL-MCNC: 0.58 MG/DL (ref 0.2–1)
BUN SERPL-MCNC: 27 MG/DL (ref 5–25)
CALCIUM SERPL-MCNC: 10.8 MG/DL (ref 8.4–10.2)
CHLORIDE SERPL-SCNC: 105 MMOL/L (ref 96–108)
CO2 SERPL-SCNC: 28 MMOL/L (ref 21–32)
CREAT SERPL-MCNC: 1.47 MG/DL (ref 0.6–1.3)
EST. AVERAGE GLUCOSE BLD GHB EST-MCNC: 140 MG/DL
GFR SERPL CREATININE-BSD FRML MDRD: 44 ML/MIN/1.73SQ M
GLUCOSE P FAST SERPL-MCNC: 141 MG/DL (ref 65–99)
HBA1C MFR BLD: 6.5 %
POTASSIUM SERPL-SCNC: 4.6 MMOL/L (ref 3.5–5.3)
PROT SERPL-MCNC: 7 G/DL (ref 6.4–8.4)
SODIUM SERPL-SCNC: 138 MMOL/L (ref 135–147)

## 2023-10-17 PROCEDURE — 80053 COMPREHEN METABOLIC PANEL: CPT

## 2023-10-17 PROCEDURE — 36415 COLL VENOUS BLD VENIPUNCTURE: CPT

## 2023-10-17 PROCEDURE — 83036 HEMOGLOBIN GLYCOSYLATED A1C: CPT

## 2023-10-18 DIAGNOSIS — E11.22 TYPE 2 DIABETES MELLITUS WITH CHRONIC KIDNEY DISEASE, WITHOUT LONG-TERM CURRENT USE OF INSULIN, UNSPECIFIED CKD STAGE (HCC): Primary | ICD-10-CM

## 2023-10-25 ENCOUNTER — OFFICE VISIT (OUTPATIENT)
Dept: DIABETES SERVICES | Facility: CLINIC | Age: 83
End: 2023-10-25
Payer: COMMERCIAL

## 2023-10-25 VITALS — WEIGHT: 190 LBS | BODY MASS INDEX: 29.76 KG/M2

## 2023-10-25 DIAGNOSIS — E11.22 TYPE 2 DIABETES MELLITUS WITH CHRONIC KIDNEY DISEASE, WITHOUT LONG-TERM CURRENT USE OF INSULIN, UNSPECIFIED CKD STAGE (HCC): Primary | ICD-10-CM

## 2023-10-25 PROCEDURE — 97802 MEDICAL NUTRITION INDIV IN: CPT

## 2023-10-25 NOTE — PATIENT INSTRUCTIONS
Consume 3 meals a day, 4-5 hours apart. Try not to skip any meals. Aim for 45 grams of carbohydrates per meal and 15 grams of carbohydrates at post dinner snack. Include whole grains, non starchy vegetables and 2 serving of fruits in a day.

## 2023-10-25 NOTE — PROGRESS NOTES
Medical Nutrition Therapy        Assessment    Visit Type: Initial visit  Chief complaint/Medical Diagnosis/reason for visit E 11.22 T2DM with chronic kidney disease, without long-term current use of insulin, unspecified stage. HPI Met with Adryan Lozano for his initial MNT visit. Patient wants to learn more about his health condition and food choices for manage his BG levels well. Adryan Marta informed that he does not have a big appetite neither tends to snack too much in between meals. He is been consuming cranberry juice and its sauce since long, and would like to continue with it as it helps with his urinary incontinence. Problems identified in food recall include inconsistent carbohydrate intake and meal timings. Provided patient with a 1700 calorie meal plan to assist with consistency, balance and portion control. Encouraged the consumption of regular meals at regular times. Advised patient to keep carbohydrate intake to 45 grams per meal and 15 grams HS  snack to assist with glycemic control. Suggested keeping protein intake to 8 ounces a day and fat to 5 servings daily to assist with lipid management and calorie control. Portion booklet and food labels were used to teach basic carbohydrate counting. Patient agreed to keep daily food logs and return them in one week for assessment. RD will remain available for further dietary questions/concerns.      Ht Readings from Last 1 Encounters:   10/16/23 5' 7" (1.702 m)     Wt Readings from Last 3 Encounters:   10/25/23 86.2 kg (190 lb)   10/16/23 86.5 kg (190 lb 9.6 oz)   06/14/23 84.4 kg (186 lb)     Weight Change: No    Barriers to Learning: no barriers    Do you follow any special diet presently?: No  Who shops: patient and spouse  Who cooks: patient and spouse    Food Log: Completed via the method of food recall    Wake up 5:30-6:00am - black coffee  Breakfast varies 6-9am: 2/3 cup cooked cereal, 2/3 cup milk, sweetener  Morning Snack:none  Lunch varies: fish, 1 pc of pizza, chicken, 1 medium sized potato, salad/ cooked vegetables - broccoli, cauliflower, beans, mix vegs, corn, peas, cranberry juice 8 oz   Afternoon Snack: beef jerkey - very seldom   Dinner 5-7pm : starch - potato mostly, rice and pasta seldomly, vegetable, meats, cranberry sauce  Evening Snack:none  Beverages: cranberry juice 4-8oz , water, black coffee  Eating out/Take out:once a week   Exercise not beyond daily activities    Calorie needs 1700 kcals/day Carbs: 45 g/meal, 15g HS snack     Fat: 5 servings/day    Protein:8 ounces/day    Nutrition Diagnosis:  Food and nutrition related knowledge deficit  related to Lack of prior exposure to accurate nutrition related information as evidenced by No prior knowledge of need for food and nutrition related recommendations    Intervention: plate method, increased fiber intake, label reading, behavior modification strategies, carbohydrate counting, meal timing, meal planning, individualized meal plan, and monitoring portion control     Treatment Goals: Patient understands education and recommendations, Patient will consume 3 meals a day, Patient will monitor portion control, Patient will consume 25-35 grams of fiber a day, Patient will count carbohydrates, and Patient will monitor blood glucose    Monitoring and evaluation:    Term code indicator  FH 4.4 Mealtime Behavior Criteria: Consume 3 meals a day, 4-5 hours apart. Try not to skip any meals. Term code indicator  FH 1.6.3 Carbohydrate Intake Criteria: Aim for 45 grams of carbohydrates per meal and 15 grams of carbohydrates at post dinner snack. Term code indicator  FH 1.6.4 Fiber Intake Criteria: Include whole grains, non starchy vegetables and 2 serving of fruits in a day.      Materials Provided: portion booklet, food record log    Patient’s Response to Instruction:  Martita Miranda  Expected Compliancegood    Start- Stop: 1:11-2:30  Total Minutes: 79 Minutes  Group or Individual Instruction: MNT-I  Other: Jeannie Birmingham, DO      Thank you for coming to the [address] [place] [address] for education today. Please feel free to call with any questions or concerns.     Laisha Olson  [address] [address]

## 2023-12-04 ENCOUNTER — OFFICE VISIT (OUTPATIENT)
Age: 83
End: 2023-12-04
Payer: COMMERCIAL

## 2023-12-04 VITALS
WEIGHT: 188.6 LBS | TEMPERATURE: 97.4 F | HEART RATE: 68 BPM | OXYGEN SATURATION: 97 % | DIASTOLIC BLOOD PRESSURE: 70 MMHG | HEIGHT: 67 IN | BODY MASS INDEX: 29.6 KG/M2 | SYSTOLIC BLOOD PRESSURE: 136 MMHG

## 2023-12-04 DIAGNOSIS — I10 BENIGN ESSENTIAL HYPERTENSION: ICD-10-CM

## 2023-12-04 DIAGNOSIS — R32 URINARY INCONTINENCE, UNSPECIFIED TYPE: ICD-10-CM

## 2023-12-04 DIAGNOSIS — N40.1 BENIGN PROSTATIC HYPERPLASIA WITH LOWER URINARY TRACT SYMPTOMS, SYMPTOM DETAILS UNSPECIFIED: ICD-10-CM

## 2023-12-04 DIAGNOSIS — N18.30 STAGE 3 CHRONIC KIDNEY DISEASE, UNSPECIFIED WHETHER STAGE 3A OR 3B CKD (HCC): ICD-10-CM

## 2023-12-04 DIAGNOSIS — F51.01 PRIMARY INSOMNIA: ICD-10-CM

## 2023-12-04 DIAGNOSIS — M16.11 OSTEOARTHRITIS OF RIGHT HIP, UNSPECIFIED OSTEOARTHRITIS TYPE: ICD-10-CM

## 2023-12-04 DIAGNOSIS — I25.10 ARTERIOSCLEROSIS OF CORONARY ARTERY: ICD-10-CM

## 2023-12-04 DIAGNOSIS — E11.22 TYPE 2 DIABETES MELLITUS WITH CHRONIC KIDNEY DISEASE, WITHOUT LONG-TERM CURRENT USE OF INSULIN, UNSPECIFIED CKD STAGE (HCC): ICD-10-CM

## 2023-12-04 DIAGNOSIS — G31.84 MCI (MILD COGNITIVE IMPAIRMENT): Primary | ICD-10-CM

## 2023-12-04 DIAGNOSIS — E55.9 VITAMIN D DEFICIENCY: ICD-10-CM

## 2023-12-04 DIAGNOSIS — E78.2 MIXED HYPERLIPIDEMIA: ICD-10-CM
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
## 2023-12-04 PROCEDURE — 99215 OFFICE O/P EST HI 40 MIN: CPT | Performed by: INTERNAL MEDICINE

## 2023-12-04 NOTE — PROGRESS NOTES
[facility]  Follow-up  [address] [place] [address] [address]  [phone]    NAME: Belkis Vergara  AGE: 82 y.o. SEX: male    DATE OF ENCOUNTER: 12/4/2023    Assessment and Plan     1. MCI (mild cognitive impairment)  [place] Cognitive Assessment: 24/30 (improved from 21/30 in 4/2023)  Geriatric Depression Screen: 5/15 (increased from 2/15 in 4/2023)  Remain active physically (atleast 30 minutes of walking/daily), mentally and socially   Pharmaceutical and non-pharmaceutical interventions discussed, continue nonpharmacologic management  Engage in cognitively challenging exercises such as crosswords and puzzles  Maintain chronic conditions under control and continue following with PCP  Repeat cognitive assessment in 6 months    Diagnostic Studies  Review of bloodwork from 10/17/23: serum chemistry reviewed; Last TSH 0.8, folate 12, B12 276 from 5/2023  Review of MRI 11/11/18 generalized volume loss    Physical Finding Impacting Function  Timed Up and Go Test: 10 seconds  Activities of Daily Living: Independent  Instrumental Activities of Daily Living: Needs some assistance  Encourage appropriate footwear at all times  Review fall risk prevention tips and adjust within the home environment as needed    2. Vitamin D deficiency  - consider rechecking levels with PCP  -Continue Vit D3 1000 units daily, continue     3. Urinary incontinence, unspecified type  Patient with hx BPH on oxybutynin, finasteride, tamsulosin  Recently has had dribbling with mild incontinence -- uses pad  - recommended patient follow up with his urologist    4. Stage 3 chronic kidney disease, unspecified whether stage 3a or 3b CKD ([address])  - last GFR 44  -Maintain adequate hydration  - continue follow up with PCP  - avoid nephrotoxic medications and NSAID    5. Fall (on) (from) other stairs and steps, initial encounter  On instance of fall up the stair at home 3 months ago without headstrike or LOC.    - advise slow careful walking up stairs  - Avoid loose carpeting on floor  - DUG was 10 seconds without gait abnormalities    6. Type 2 diabetes mellitus with chronic kidney disease, without long-term current use of insulin, unspecified CKD stage ([facility])  A1c 6.5 on last labwork. - diabetes is adequately controlled at this time  - follow up with PCP    7. Benign prostatic hyperplasia with lower urinary tract symptoms, symptom details unspecified  Patient with hx BPH on oxybutynin, finasteride, tamsulosin  Some urinary incontinence per patient  - follow urology and manage chronic condition    8. Primary insomnia  At this time, no insomnia. Not on pharmacologic therapy  - continue monitoring     9. Osteoarthritis of right hip, unspecified osteoarthritis type  - May use tylenol 500 mg q6h PRN for pain    10. Mixed hyperlipidemia  Last LDL 91 and cholesterol 162.   - controlled off statin therapy  - on omega 3 fatty acid supplement  - continue monitor with PCP    11. Arteriosclerosis of coronary artery  History of CAD s/p stenting,  - on aspirin    12. Benign essential hypertension  /70. Adequate control for age. - continue on Valsartan-hydrocholorothiazide 320-25 mg and amlodipine 2.5 mg    tips      Chief Complaint     Chief Complaint   Patient presents with    Follow-up     6M     Patient is here today for follow up of memory difficulties and chronic conditions     History of Present Illness       We had the pleasure of evaluating  Nolberto Pepe who is a 82 y.o. male in Geriatric follow-up today. Since our last follow-up 5/15/23. Patient has had fall 3 months ago when he was going up the stairs and tripped to the side; at that time, no head strike or loss of consciousness. Since, he has not had any hospitalizations and has not had medication changes or major life events. Memory issues have not changed in nature since last visit per caregiver, his wife. MOCA in 4/2023 was 21/30 but has improved to 24/30 on today's assessment. [employer] reports he does   drive, does not get lost in familiar settings, and has not had recent citations or accidents. He does not manage checkbook, paying bills, and managing investments; mostly as these have always been taken care of by his wife. He does endorse some urine incontinence with dribbling and has had to wear a pad -- he has a history of prostate enlargement and follows urology; he states he will make a follow up appointment with his urologist soon. Patient does have tinnitus in his ears (but unsure which ear); he states that this is a chronic issue he dealt with as he used to work around heavy machinery. He does not report any sleep issues. They do have a living will and does  have an appointed POA (wife). Family members accompanying the patient today include Valeriy Trans, wife). They report the patients behaviors include the following: Family Notes Behaviors have been stable since the last visit in May. Patient has not had significant changes in memory. When asked about his driving -- wife confirms that he always drives with herself at the passenger seat and though he may take creative ways to get around the roads, he is never lost and he is able to get to the destination without issues; no trouble stopping at stoplights or signs. She does endorse he has an active life and works in his barn -- especially during the summer. He does not take care of the bills, as that is primarily his wife's job historically. He is able to perform his ADLs -- eat and dress himself independently. He does cook himself as well. The following portions of the patient's history were reviewed and updated as appropriate: allergies, current medications, past family history, past medical history, past social history, past surgical history and problem list.      Review of Systems     Review of Systems   Constitutional:  Negative for chills and fever. HENT:  Positive for tinnitus.  Negative for ear pain, hearing loss and sore throat. Eyes:  Negative for pain and visual disturbance. Respiratory:  Negative for cough and shortness of breath. Cardiovascular:  Negative for chest pain and palpitations. Gastrointestinal:  Negative for abdominal pain and vomiting. Genitourinary:  Negative for dysuria and hematuria. Urine incontinence   Musculoskeletal:  Negative for arthralgias and back pain. Skin:  Negative for color change and rash. Neurological:  Negative for seizures and syncope. All other systems reviewed and are negative. Objective     /70 (BP Location: Left arm, Patient Position: Sitting, Cuff Size: Standard)   Pulse 68   Temp (!) 97.4 °F (36.3 °C) (Temporal)   Ht 5' 7" (1.702 m)   Wt 85.5 kg (188 lb 9.6 oz)   SpO2 97%   BMI 29.54 kg/m²     Physical Exam  Vitals reviewed. Constitutional:       General: He is not in acute distress. Appearance: Normal appearance. He is normal weight. He is not ill-appearing. HENT:      Head: Normocephalic and atraumatic. Nose: Nose normal. No congestion. Mouth/Throat:      Mouth: Mucous membranes are moist.      Pharynx: Oropharynx is clear. No oropharyngeal exudate. Eyes:      Conjunctiva/sclera: Conjunctivae normal.      Pupils: Pupils are equal, round, and reactive to light. Cardiovascular:      Rate and Rhythm: Normal rate and regular rhythm. Pulses: Normal pulses. Heart sounds: Normal heart sounds. No murmur heard. Pulmonary:      Effort: Pulmonary effort is normal.      Breath sounds: Normal breath sounds. No wheezing or rales. Abdominal:      General: Bowel sounds are normal. There is no distension. Palpations: Abdomen is soft. Tenderness: There is no abdominal tenderness. There is no guarding. Musculoskeletal:         General: No swelling. Normal range of motion. Cervical back: Normal range of motion and neck supple. Skin:     General: Skin is warm and dry.       Capillary Refill: Capillary refill takes less than 2 seconds. Findings: No lesion or rash. Neurological:      General: No focal deficit present. Mental Status: He is alert and oriented to person, place, and time. Gait: Gait normal.      Comments: Strength  b/l UE and LE  MOCA   DUG 10 seconds, no gait abnormality   Psychiatric:         Mood and Affect: Mood normal.      Comments: GDS 5/15         Pertinent Laboratory/Diagnostic Studies:  10/17/23:  Na 138, K 4.6, Cl 105, CO2 28, BUN 27, Cr 1.47  Ca 10.8  Chol 162, LDL 91  Folate 12 (2023)  B12 276 (2023)  Alc 6.5 (10/17/23)  TSH 0.82 (5/10/23)    Current Medications     Amlodipine 2.5 mg  Aspirin 325 mg   Vit D3 1000 units  Finasteride 5 mg   Flonase 50 mcg  Nitrostat 0.4 mg SL as needed  Omega 3   Oxybutynin 10 mg qhs  Tamsulosin 0.4 mg BID  Valsartan-hydrocholorothiazide 320-25 mg     Name: Annette Dover  : 1940  MRN: [id number]  DOS: 2023    Diagnoses:   Diagnosis ICD-10-CM Associated Orders   1. MCI (mild cognitive impairment)  G31.84       2. Vitamin D deficiency  E55.9       3. Urinary incontinence, unspecified type  R32       4. Stage 3 chronic kidney disease, unspecified whether stage 3a or 3b CKD ([facility])  N18.30       5. Fall (on) (from) other stairs and steps, initial encounter  W10. 8XXA       6. Type 2 diabetes mellitus with chronic kidney disease, without long-term current use of insulin, unspecified CKD stage ([facility])  E11.22       7. Benign prostatic hyperplasia with lower urinary tract symptoms, symptom details unspecified  N40.1       8. Primary insomnia  F51.01       9. Osteoarthritis of right hip, unspecified osteoarthritis type  M16.11       10. Mixed hyperlipidemia  E78.2       11. Arteriosclerosis of coronary artery  I25.10       12.  Benign essential hypertension  I10

## 2023-12-04 NOTE — PROGRESS NOTES
Amirah Galav[facility]  [address] [facility], [address] [place] [address], [facility]  [phone]    Social Work Follow-Up    LSW met with Lizeth Matias for follow up visit. Completed Cochran Cognitive Assessment, score 24/30 (previously 21/30 in 04/17/23), and Geriatric Depression Screen, 5/15 (previously 2/15 in 04/17/23). Juan Miguel Cognitive Assessment (MoCA) Version 8.1  Education: 12th grade    Points Earned POSSIBLE Points   Visuospatial/Executive   Alternating [place] Making 1 1   Visuoconstructional skills 0 1   Visuoconstructional skills (clock) 3 3   Naming   Naming Animals 3 3   Attention   Digit Span 2 2   Vigilance (letters) 1 1   Serial 7 subtraction 3 3   Language   Sentence Repetition 2 2   Verbal fluency 0 1   Abstraction   Abstraction (word pairings) 2 2   Delayed recall   Delayed recall 0 5   Memory index score: 3/15   Orientation   Orientation 6 6   TOTAL SCORE: 24/30  (Normal ?26/30)   Additional notes:      LSW to remain available as needed.

## 2023-12-15 DIAGNOSIS — N40.1 BENIGN PROSTATIC HYPERPLASIA WITH LOWER URINARY TRACT SYMPTOMS, SYMPTOM DETAILS UNSPECIFIED: ICD-10-CM

## 2023-12-15 RX ORDER — TAMSULOSIN HYDROCHLORIDE 0.4 MG/1
0.4 CAPSULE ORAL 2 TIMES DAILY
Qty: 180 CAPSULE | Refills: 1 | Status: SHIPPED | OUTPATIENT
Start: 2023-12-15

## 2023-12-19 NOTE — TELEPHONE ENCOUNTER
My name is Gadiel Baer. My address is [address] [place] [address], phone number [phone]. I need a refill on my finasteride, 5 milligrams and my doctor's doctor [facility] in [place], [place] If you need any other information give me a call at [phone]. Thank you.  You received a voice mail from GADIEL BAER

## 2023-12-19 NOTE — TELEPHONE ENCOUNTER
P/C to patient to call pharmacy for his last refill on Rx; Finasteride. Patient has refills til 2/2024.

## 2024-02-05 DIAGNOSIS — I10 BENIGN ESSENTIAL HYPERTENSION: ICD-10-CM

## 2024-02-05 RX ORDER — AMLODIPINE BESYLATE 2.5 MG/1
2.5 TABLET ORAL
Qty: 90 TABLET | Refills: 1 | Status: SHIPPED | OUTPATIENT
Start: 2024-02-05

## 2024-02-13 DIAGNOSIS — I10 BENIGN ESSENTIAL HYPERTENSION: ICD-10-CM

## 2024-02-13 RX ORDER — VALSARTAN AND HYDROCHLOROTHIAZIDE 320; 25 MG/1; MG/1
1 TABLET, FILM COATED ORAL
Qty: 90 TABLET | Refills: 1 | Status: SHIPPED | OUTPATIENT
Start: 2024-02-13

## 2024-02-13 NOTE — TELEPHONE ENCOUNTER
Reason for call:   [x] Refill   [] Prior Auth  [] Other:     Office:   [x] PCP/Provider - Dr Dumont  [] Specialty/Provider -     Medication: valsartan HCTZ 320-25 mg     Dose/Frequency: 320-25 mg / daily    Quantity: 90D w refill    Pharmacy: [facility] on file    Does the patient have enough for 3 days?   [] Yes   [x] No - Send as HP to POD

## 2024-04-10 ENCOUNTER — RA CDI HCC (OUTPATIENT)
Dept: OTHER | Facility: HOSPITAL | Age: 84
End: 2024-04-10

## 2024-04-10 NOTE — PROGRESS NOTES
HCC coding opportunities       Chart reviewed, no opportunity found: CHART REVIEWED, NO OPPORTUNITY FOUND      This is a reminder to address (resolve/update/assess) ALL HCC (risk adjustment) codes as found on active problem list for 2024 as patient scores reset to zero DIANA.  Also, just a reminder to please review and assess all other chronic conditions for 2024  Patients Insurance     Medicare Insurance: [employer] Advantage

## 2024-06-03 ENCOUNTER — OFFICE VISIT (OUTPATIENT)
Age: 84
End: 2024-06-03
Payer: COMMERCIAL

## 2024-06-03 VITALS
HEIGHT: 67 IN | OXYGEN SATURATION: 97 % | SYSTOLIC BLOOD PRESSURE: 112 MMHG | HEART RATE: 71 BPM | DIASTOLIC BLOOD PRESSURE: 70 MMHG | BODY MASS INDEX: 30.07 KG/M2 | WEIGHT: 191.6 LBS | TEMPERATURE: 98.1 F

## 2024-06-03 DIAGNOSIS — E78.2 MIXED HYPERLIPIDEMIA: ICD-10-CM

## 2024-06-03 DIAGNOSIS — E11.22 TYPE 2 DIABETES MELLITUS WITH CHRONIC KIDNEY DISEASE, WITHOUT LONG-TERM CURRENT USE OF INSULIN, UNSPECIFIED CKD STAGE (HCC): ICD-10-CM

## 2024-06-03 DIAGNOSIS — N18.30 STAGE 3 CHRONIC KIDNEY DISEASE, UNSPECIFIED WHETHER STAGE 3A OR 3B CKD (HCC): ICD-10-CM

## 2024-06-03 DIAGNOSIS — I10 BENIGN ESSENTIAL HYPERTENSION: ICD-10-CM

## 2024-06-03 DIAGNOSIS — R32 URINARY INCONTINENCE, UNSPECIFIED TYPE: ICD-10-CM

## 2024-06-03 DIAGNOSIS — I25.10 ARTERIOSCLEROSIS OF CORONARY ARTERY: ICD-10-CM

## 2024-06-03 DIAGNOSIS — G31.84 MCI (MILD COGNITIVE IMPAIRMENT): Primary | ICD-10-CM

## 2024-06-03 DIAGNOSIS — Z71.89 ADVANCED CARE PLANNING/COUNSELING DISCUSSION: ICD-10-CM

## 2024-06-03 PROCEDURE — 99483 ASSMT & CARE PLN PT COG IMP: CPT | Performed by: INTERNAL MEDICINE

## 2024-06-03 NOTE — PROGRESS NOTES
[facility]  Follow-up/cognitive care planning  [address] [facility] [id number] [phone]    NAME: Gadiel Gallegos  AGE: 84 y.o. SEX: male    DATE OF ENCOUNTER: 6/3/2024    Assessment and Plan     1. MCI (mild cognitive impairment)  Hematite Cognitive Assessment: 20/30 (was 24/30 on 12/4/2023) with deficits and visuospatial, executive function, draw clock, delayed recall and orientation  Geriatric Depression Screen: 7/15 (increased from 5/15 in 12/2023)   Mild cognitive impairment (FAST stage III)  Remain active physically (atleast 30 minutes of walking/daily), mentally and socially   Pharmaceutical and non-pharmaceutical interventions discussed, continue nonpharmacologic management  Engage in cognitively challenging exercises such as crosswords and puzzles  Maintain chronic conditions under control and continue following with PCP  Patient is independent with ADLs and IADLs  Driving safety concerns: Patient drives and no concerns at this time  Decision-making: At this time of the visit, it is my opinion that the patient is able to make his own medical decisions.  Medications Review Medications seem appropriate for present conditions. Check with PCP before using over the counter medications. Avoid over the counter medications that can affect cognition (e.g., Benadryl, Tylenol PM). Avoid NSAIDs due to risk of GI bleed and renal impairment   Caregiver review: Patient is independent with ADLs and IADLs.  No caregiver issues at this time.  ACP review: Patient has a living will and POA      Follow-up in 6 months and repeat MoCA     2. Physical Finding Impacting Function  Timed Up and Go Test: 8 seconds   Activities of Daily Living: Independent  Instrumental Activities of Daily Living: Needs some assistance  Encourage appropriate footwear at all times  Review fall risk prevention tips and adjust within the home environment as needed    3. Vitamin D deficiency  - consider rechecking levels with  PCP  -Continue Vit D3 1000 units daily, continue     4. Urinary incontinence, unspecified type  Patient with hx BPH on oxybutynin, finasteride, tamsulosin  Recently has had dribbling with mild incontinence   - recommended patient follow up with his urologist    5. Stage 3 chronic kidney disease, unspecified whether stage 3a or 3b CKD ([facility])  - last GFR 44  -Maintain adequate hydration  - continue follow up with PCP  - avoid nephrotoxic medications and NSAID    6. Fall (on) (from) other stairs and steps, initial encounter  On instance of fall up the stair at home 3 months ago without headstrike or LOC.   - advise slow careful walking up stairs  - Avoid loose carpeting on floor  - TUG was 8 seconds without gait abnormalities     7. Type 2 diabetes mellitus with chronic kidney disease, without long-term current use of insulin, unspecified CKD stage ([facility])  A1c 6.5 on last labwork. Pending repeat blood work  - diabetes is adequately controlled at this time  - follow up with PCP    8. Benign prostatic hyperplasia with lower urinary tract symptoms, symptom details unspecified  Patient with hx BPH on oxybutynin, finasteride, tamsulosin  Some urinary incontinence per patient  Follows with urology and may need possible prostate surgery    9. Primary insomnia  At this time, no insomnia.   Not on pharmacologic therapy  - continue monitoring     10. Osteoarthritis of right hip, unspecified osteoarthritis type  - May use tylenol 500 mg q6h PRN for pain  -patient will see orthopedic surgeon to followup    11. Mixed hyperlipidemia  Last LDL 91 and cholesterol 162.   - Pending repeat blood work  - controlled off statin therapy  - on omega 3 fatty acid supplement  - continue monitor with PCP    12. Arteriosclerosis of coronary artery  History of CAD s/p stenting,  - on aspirin    13. Benign essential hypertension  /70. Adequate control for age.   - continue on Valsartan-hydrocholorothiazide 320-25 mg and amlodipine 2.5 mg        Chief Complaint     Chief Complaint   Patient presents with    Follow-up     6M followup      Patient is here today for follow up of memory difficulties and chronic conditions     History of Present Illness     We had the pleasure of evaluating  Gadiel Gallegos who is a 84 y.o. male in Geriatric follow-up today. Since, he has not had any hospitalizations and has not had medication changes or major life events. Last seen by our clinic on 12/4/2023 for mild cognitive impairment.  He has past medical history of stage III kidney disease, type 2 diabetes, hypertension, hyperlipidemia, CAD, benign hypertension. Coming in for followup.  Memory issues have not changed. No recent falls. Last fall was before cliff and before last visit. Has not recurred.  He is independent with ADLs and IADLs.  He manages the couples finances.    No change in memory. No hearing issues. No changes in function or health. No ED visits. Good support. He has a local son in [facility]. Wife has 2 kids in [facility].   No more social events after 2020 after covid. Limited movement, has been less active socially and physically.   He no longer has any hobbies., and occasionally hangs with the neighbors. He does mow the lawn 3 x a week.   No concerns with driving    FUNCTIONAL STATUS:    ADLs  Does patient require assistance with:  Bathing: No  Dressing:  No  Transferring: No   Continence:  No  Toileting:  No  Feeding:  No     IADLs  Dose patient require assistance with:  Telephone:  No  Shopping:  No  Food Preparation:  No  Housekeeping:  No  Laundry:  No  Transportation:  No  Medications:  No  Finances:  No     NEUROPSYCH SYMPTOMS:  Does patient get angry or hostile?  Resist care from others?  Upset here and there.   Does patient see or hear things that no one else can see or hear?   No  Does patient act impatient and cranky? Does mood frequently change for no apparent reason?  No  Does patient act suspicious without good reason  (example: believes that others are stealing from him or her, or planning to harm him or her in some way)?  No  Does patient less interested in his or her usual activities or in the activities and plans of others?  No  Does patient have trouble sleeping at night?  No  Dose patient have abnormal movements while asleep?  No   Different bed rooms now.   SAFETY:  Hearing and vision issue: Yes, wears glasses. No difficulty hearing  Any gait or balance disorder:  No  Uses: cane or walker No  Any falls in the last year:  Patient had one fall prior to previous visit.   Any history of wandering: none  Are there firearms or guns in the home: Yes  Does patient drive:  No  Any driving accidents or citations in the last year:  No  Any concerns about patient's ability to drive:  No     ACP REVIEW:  Does patient have POA:  Wife  Does patient have a Living will: Yes  Any legal assistance needed for healthcare planning?:   No    The following portions of the patient's history were reviewed and updated as appropriate: allergies, current medications, past family history, past medical history, past social history, past surgical history and problem list.      Review of Systems     Review of Systems   Constitutional:  Negative for chills and fever.   HENT:  Negative for ear pain, hearing loss, sore throat and tinnitus.    Eyes:  Negative for pain and visual disturbance.   Respiratory:  Negative for cough and shortness of breath.    Cardiovascular:  Negative for chest pain and palpitations.   Gastrointestinal:  Negative for abdominal pain and vomiting.   Genitourinary:  Negative for dysuria and hematuria.        Urine incontinence   Musculoskeletal:  Negative for arthralgias and back pain.   Skin:  Negative for color change and rash.   Neurological:  Negative for seizures and syncope.   All other systems reviewed and are negative.      Objective     There were no vitals taken for this visit.    Physical Exam  Vitals reviewed.    Constitutional:       General: He is not in acute distress.     Appearance: Normal appearance. He is normal weight. He is not ill-appearing.   HENT:      Head: Normocephalic and atraumatic.      Nose: Nose normal. No congestion.      Mouth/Throat:      Mouth: Mucous membranes are moist.      Pharynx: Oropharynx is clear. No oropharyngeal exudate.   Eyes:      Conjunctiva/sclera: Conjunctivae normal.      Pupils: Pupils are equal, round, and reactive to light.   Cardiovascular:      Rate and Rhythm: Normal rate and regular rhythm.      Pulses: Normal pulses.      Heart sounds: Normal heart sounds. No murmur heard.  Pulmonary:      Effort: Pulmonary effort is normal.      Breath sounds: Normal breath sounds. No wheezing or rales.   Abdominal:      General: Bowel sounds are normal. There is no distension.      Palpations: Abdomen is soft.      Tenderness: There is no abdominal tenderness. There is no guarding.   Musculoskeletal:         General: No swelling. Normal range of motion.      Cervical back: Normal range of motion and neck supple.   Skin:     General: Skin is warm and dry.      Capillary Refill: Capillary refill takes less than 2 seconds.      Findings: No lesion or rash.   Neurological:      General: No focal deficit present.      Mental Status: He is alert and oriented to person, place, and time.      Gait: Gait normal.      Comments: Strength 5/5 b/l UE and LE  MOCA 20/30  TUG 8 seconds   Psychiatric:         Mood and Affect: Mood normal.      Comments: GDS 7/15

## 2024-06-10 DIAGNOSIS — N40.1 BENIGN PROSTATIC HYPERPLASIA WITH LOWER URINARY TRACT SYMPTOMS, SYMPTOM DETAILS UNSPECIFIED: ICD-10-CM

## 2024-06-11 RX ORDER — FINASTERIDE 5 MG/1
5 TABLET, FILM COATED ORAL DAILY
Qty: 90 TABLET | Refills: 1 | Status: SHIPPED | OUTPATIENT
Start: 2024-06-11

## 2024-06-20 ENCOUNTER — TELEPHONE (OUTPATIENT)
Dept: OTHER | Facility: OTHER | Age: 84
End: 2024-06-20

## 2024-06-20 NOTE — TELEPHONE ENCOUNTER
Patient calling to verify next appointment with Dr. Dumont. Updated patient that his next appointment is August 14 at 4:20 pm.

## 2024-08-05 DIAGNOSIS — I10 BENIGN ESSENTIAL HYPERTENSION: ICD-10-CM

## 2024-08-06 RX ORDER — AMLODIPINE BESYLATE 2.5 MG/1
2.5 TABLET ORAL
Qty: 90 TABLET | Refills: 1 | Status: SHIPPED | OUTPATIENT
Start: 2024-08-06

## 2024-08-07 ENCOUNTER — RA CDI HCC (OUTPATIENT)
Dept: OTHER | Facility: HOSPITAL | Age: 84
End: 2024-08-07

## 2024-08-12 ENCOUNTER — CONSULT (OUTPATIENT)
Dept: FAMILY MEDICINE CLINIC | Facility: CLINIC | Age: 84
End: 2024-08-12
Payer: COMMERCIAL

## 2024-08-12 VITALS
SYSTOLIC BLOOD PRESSURE: 124 MMHG | WEIGHT: 187 LBS | BODY MASS INDEX: 29.35 KG/M2 | TEMPERATURE: 97.6 F | HEIGHT: 67 IN | DIASTOLIC BLOOD PRESSURE: 78 MMHG | HEART RATE: 58 BPM | OXYGEN SATURATION: 95 %

## 2024-08-12 DIAGNOSIS — Z01.818 PRE-OP EXAMINATION: Primary | ICD-10-CM

## 2024-08-12 DIAGNOSIS — E11.22 TYPE 2 DIABETES MELLITUS WITH CHRONIC KIDNEY DISEASE, WITHOUT LONG-TERM CURRENT USE OF INSULIN, UNSPECIFIED CKD STAGE (HCC): ICD-10-CM

## 2024-08-12 DIAGNOSIS — N40.1 BENIGN PROSTATIC HYPERPLASIA WITH LOWER URINARY TRACT SYMPTOMS, SYMPTOM DETAILS UNSPECIFIED: ICD-10-CM

## 2024-08-12 DIAGNOSIS — I10 BENIGN ESSENTIAL HYPERTENSION: ICD-10-CM

## 2024-08-12 DIAGNOSIS — E78.2 MIXED HYPERLIPIDEMIA: ICD-10-CM

## 2024-08-12 DIAGNOSIS — H26.9 CATARACT OF BOTH EYES, UNSPECIFIED CATARACT TYPE: ICD-10-CM

## 2024-08-12 DIAGNOSIS — G31.84 MCI (MILD COGNITIVE IMPAIRMENT): ICD-10-CM

## 2024-08-12 DIAGNOSIS — I25.10 ARTERIOSCLEROSIS OF CORONARY ARTERY: ICD-10-CM

## 2024-08-12 DIAGNOSIS — N18.30 STAGE 3 CHRONIC KIDNEY DISEASE, UNSPECIFIED WHETHER STAGE 3A OR 3B CKD (HCC): ICD-10-CM

## 2024-08-12 PROCEDURE — 99213 OFFICE O/P EST LOW 20 MIN: CPT | Performed by: FAMILY MEDICINE

## 2024-08-12 NOTE — PROGRESS NOTES
Diabetic Foot Exam    Patient's shoes and socks removed.    Right Foot/Ankle   Right Foot Inspection  Skin Exam: skin normal and skin intact. No dry skin, no warmth, no callus, no erythema, no maceration, no abnormal color, no pre-ulcer, no ulcer and no callus.     Toe Exam: ROM and strength within normal limits.     Sensory   Vibration: intact  Proprioception: intact  Monofilament testing: absent    Vascular  Capillary refills: < 3 seconds  The right DP pulse is 2+. The right PT pulse is 2+.     Left Foot/Ankle  Left Foot Inspection  Skin Exam: skin normal and skin intact. No dry skin, no warmth, no erythema, no maceration, normal color, no pre-ulcer, no ulcer and no callus.     Toe Exam: ROM and strength within normal limits.     Sensory   Vibration: intact  Proprioception: intact  Monofilament testing: intact    Vascular  Capillary refills: < 3 seconds  The left DP pulse is 2+. The left PT pulse is 2+.     Assign Risk Category  No deformity present  No loss of protective sensation  No weak pulses  Risk: 0

## 2024-08-12 NOTE — PROGRESS NOTES
Ambulatory Visit  Name: Gadiel Gallegos      : 1940      MRN: [id number]  Encounter Provider: Jackson Benjamin DO  Encounter Date: 2024   Encounter department: [facility]    Assessment & Plan   1. Pre-op examination  Assessment & Plan:  Patient was seen for preop clearance.  Chronic medical problems reviewed.  Medication list reviewed and updated.  He is clinically stable.  He is at low risk for the proposed surgery and he is medically cleared to proceed.  2. Cataract of both eyes, unspecified cataract type  3. Type 2 diabetes mellitus with chronic kidney disease, without long-term current use of insulin, unspecified CKD stage (HCC)  4. Arteriosclerosis of coronary artery  5. Benign essential hypertension  6. Benign prostatic hyperplasia with lower urinary tract symptoms, symptom details unspecified  7. Mixed hyperlipidemia  8. Stage 3 chronic kidney disease, unspecified whether stage 3a or 3b CKD (HCC)  9. MCI (mild cognitive impairment)         History of Present Illness     Gadiel Gallegos  84 y.o.  male    SURGEON:Aaliyah    SURGERY/PROCEDURE: Cataract both eyes    DATE OF SURGERY: 2024 and 9/3/2024    PRIOR ANESTHESIA:yes    COMPLICATION: no    BLEEDING PROBLEM: no    PERTINENT PMH: Type 2 diabetes, chronic kidney disease, hyperlipidemia, BPH, hypertension and coronary artery disease    EXERCISE CAPACITY:   CAN WALK 4 BLOCKS AND OR CLIMB 2 FLIGHTS: Yes    HOME LIVING SITUATION SAFE AND SECURE: Yes      TOBACCO: no     ETOH: no     ILLEGAL DRUGS: no      Patient presents for preop clearance for upcoming cataract surgery.  Chronic medical problems listed above.  Medications include amlodipine, finasteride, oxybutynin, Flomax, valsartan hydrochlorothiazide.  Patient has no complaints today      Review of Systems   Constitutional: Negative.    Respiratory: Negative.     Cardiovascular: Negative.    Gastrointestinal: Negative.    Genitourinary: Negative.    Musculoskeletal:  "Negative.    Psychiatric/Behavioral: Negative.       Past Medical History:   Diagnosis Date   • Abnormal blood chemistry     Last Assessed: 7/19/2013   • Abnormal glucose     Last Assessed: 11/6/2015   • Arthritis    • Bladder tumor    • BPH (benign prostatic hypertrophy)    • Cataracts, bilateral     \"start of\"   • Coronary artery disease     2 stents. MI x2   • Diabetes mellitus (HCC)     Diet controlled pre-diabetic   • Erectile dysfunction of non-organic origin     Last Assessed: 5/9/2014   • Fatigue     Resolved: 2/23/2015   • Glaucoma     \"Start of\"   • Hematuria, microscopic     Last Assessed: 11/7/2016   • History of bladder stone    • Hyperlipidemia    • Hypertension    • Knee pain, bilateral     If walking far. No assistive devices   • Myocardial infarction (HCC) 2011, 2012    x2   • Primary insomnia     Last Assessed; 7/20/2016   • Recurrent right inguinal hernia     Last Assessed: 2/23/2015   • Urinary incontinence    • Urinary urgency     Last Assessed: 10/30/2015   • Wears glasses      Past Surgical History:   Procedure Laterality Date   • BLADDER STONE REMOVAL      Laser procedure   • CARDIAC CATHETERIZATION  2003    RCA stenting and 2004 stenting to circuflex   • COLONOSCOPY     • CORONARY ANGIOPLASTY WITH STENT PLACEMENT      Has 2 stents   • CYSTOSCOPY  11/11/2015    w/ Cystolitholapaxy   • HERNIA REPAIR Bilateral 03/05/2015    Inguinal   • JOINT REPLACEMENT Bilateral     TKR   • JOINT REPLACEMENT Right     Right THR   • WI CYSTO W/REMOVAL OF LESIONS SMALL N/A 2/8/2017    Procedure: CYSTOSCOPY WITH BIOPSIES WITH FULGURATION, INSTILLATION OF MYTOMYCIN, DIALATION OF MEATUS ;  Surgeon: Steven Fisher MD;  Location: [facility];  Service: Urology   • WISDOM TOOTH EXTRACTION       Family History   Problem Relation Age of Onset   • No Known Problems Mother         Diabetes per [employer]   • No Known Problems Father    • Skin cancer Brother    • Diabetes Brother    • Bipolar disorder Other    • " Diabetes Family    • Hypertension Family    • Substance Abuse Neg Hx    • Alcohol abuse Neg Hx      Social History     Tobacco Use   • Smoking status: Former     Types: Cigarettes, Pipe   • Smokeless tobacco: Never   • Tobacco comments:     Quit 50 years ago and only smoked for 1 year   Vaping Use   • Vaping status: Never Used   Substance and Sexual Activity   • Alcohol use: Yes     Alcohol/week: 2.0 standard drinks of alcohol     Types: 2 Shots of liquor per week     Comment: 2 shots of liquor weekly   • Drug use: No   • Sexual activity: Not on file     Current Outpatient Medications on File Prior to Visit   Medication Sig   • acetaminophen (TYLENOL) 325 mg tablet Take 650 mg by mouth every 6 (six) hours as needed for mild pain   • amLODIPine (NORVASC) 2.5 mg tablet Take 1 tablet (2.5 mg total) by mouth daily at bedtime   • aspirin (ECOTRIN) 325 mg EC tablet Take 1 tablet by mouth   • cholecalciferol (VITAMIN D3) 1,000 units tablet Take by mouth   • finasteride (PROSCAR) 5 mg tablet Take 1 tablet (5 mg total) by mouth daily   • ketoconazole (NIZORAL) 2 % cream    • nitroglycerin (NITROSTAT) 0.4 mg SL tablet Place 1 tablet (0.4 mg total) under the tongue every 5 (five) minutes as needed for chest pain   • Omega-3 Fatty Acids (OMEGA 3 PO) Take 2 capsules by mouth daily   • oxybutynin (DITROPAN-XL) 10 MG 24 hr tablet Take 1 tablet (10 mg total) by mouth daily at bedtime   • Red Yeast Rice Extract (RED YEAST RICE PO) Take 2 capsules by mouth daily   • tamsulosin (FLOMAX) 0.4 mg Take 1 capsule (0.4 mg total) by mouth 2 (two) times a day   • valsartan-hydrochlorothiazide (DIOVAN-HCT) 320-25 MG per tablet Take 1 tablet by mouth daily at bedtime   • fluticasone (FLONASE) 50 mcg/act nasal spray 2 sprays into each nostril daily for 30 days (Patient taking differently: 2 sprays into each nostril if needed)     Allergies   Allergen Reactions   • Penicillin G Facial Swelling     Immunization History   Administered Date(s)  "Administered   • COVID-19 MODERNA VACC 0.5 ML IM 03/24/2021, 04/23/2021, 12/22/2021   • INFLUENZA 09/04/2018, 09/03/2020   • Influenza Split High Dose Preservative Free IM 08/30/2017   • Influenza, high dose seasonal 0.7 mL 10/08/2021, 10/10/2022   • Pneumococcal Conjugate 13-Valent 03/20/2015   • Pneumococcal Polysaccharide PPV23 07/19/2013   • Zoster 01/01/2013   • Zoster Vaccine Recombinant 12/12/2019, 02/26/2020   • influenza, trivalent, adjuvanted 09/30/2019     Objective     /78   Pulse 58   Temp 97.6 °F (36.4 °C)   Ht 5' 7.32\" (1.71 m)   Wt 84.8 kg (187 lb)   SpO2 95%   BMI 29.01 kg/m²     Physical Exam  Vitals and nursing note reviewed.   Constitutional:       General: He is not in acute distress.     Appearance: Normal appearance.   HENT:      Head: Normocephalic and atraumatic.   Eyes:      Pupils: Pupils are equal, round, and reactive to light.   Cardiovascular:      Rate and Rhythm: Normal rate and regular rhythm.      Pulses: Normal pulses.      Heart sounds: Normal heart sounds.   Pulmonary:      Effort: Pulmonary effort is normal.      Breath sounds: Normal breath sounds.   Musculoskeletal:         General: Normal range of motion.      Cervical back: Normal range of motion.   Skin:     General: Skin is warm and dry.   Neurological:      General: No focal deficit present.      Mental Status: He is alert and oriented to person, place, and time. Mental status is at baseline.   Psychiatric:         Mood and Affect: Mood normal.         Behavior: Behavior normal.         Thought Content: Thought content normal.         Judgment: Judgment normal.         "

## 2024-08-12 NOTE — ASSESSMENT & PLAN NOTE
Patient was seen for preop clearance.  Chronic medical problems reviewed.  Medication list reviewed and updated.  He is clinically stable.  He is at low risk for the proposed surgery and he is medically cleared to proceed.

## 2024-08-21 ENCOUNTER — TELEPHONE (OUTPATIENT)
Dept: ADMINISTRATIVE | Facility: OTHER | Age: 84
End: 2024-08-21

## 2024-08-21 NOTE — TELEPHONE ENCOUNTER
08/21/24 2:22 PM    Patient was flagged by a Third Party Payer report as being due for a refill on the following medications, VALSARTAN-HYDROCHLOROTHIAZIDE -25 MG . Patient was contacted to review these medications. There was no answer and a message was left.     Thank you.  Angela Murphy MA  [facility]

## 2024-10-10 ENCOUNTER — NURSE TRIAGE (OUTPATIENT)
Dept: OTHER | Facility: OTHER | Age: 84
End: 2024-10-10

## 2024-10-10 DIAGNOSIS — I10 BENIGN ESSENTIAL HYPERTENSION: ICD-10-CM

## 2024-10-10 NOTE — TELEPHONE ENCOUNTER
"Regarding: valsartan refill  ----- Message from Sylvie CASTILLO sent at 10/10/2024  7:31 PM EDT -----  Name valsartan-hydrochlorothiazide (DIOVAN-HCT) 320-25 MG per tablet   Dose/Frequency  1 tablet/Daily at bedtime  Quantity 90 tablet  Verified pharmacy   [x ]  Verified ordering Provider   [ x]  Does patient have enough for the next 3 days? Yes [ ] No [x ]    [id number] - [place] - [address] [place] [id number]  Phone: [phone]  Fax: [phone]     PT stated, \"I am waiting at the pharmacy now for this to be refilled.\"    "

## 2024-10-11 DIAGNOSIS — N18.30 STAGE 3 CHRONIC KIDNEY DISEASE, UNSPECIFIED WHETHER STAGE 3A OR 3B CKD (HCC): ICD-10-CM

## 2024-10-11 DIAGNOSIS — I10 BENIGN ESSENTIAL HYPERTENSION: Primary | ICD-10-CM

## 2024-10-11 DIAGNOSIS — E11.22 TYPE 2 DIABETES MELLITUS WITH CHRONIC KIDNEY DISEASE, WITHOUT LONG-TERM CURRENT USE OF INSULIN, UNSPECIFIED CKD STAGE (HCC): ICD-10-CM

## 2024-10-11 RX ORDER — VALSARTAN AND HYDROCHLOROTHIAZIDE 320; 25 MG/1; MG/1
1 TABLET, FILM COATED ORAL
Qty: 30 TABLET | Refills: 0 | Status: SHIPPED | OUTPATIENT
Start: 2024-10-11 | End: 2024-10-15 | Stop reason: SDUPTHER

## 2024-10-11 NOTE — TELEPHONE ENCOUNTER
"Medication Refill Request     Name valsartan-hydrochlorothiazide (Diovan- HCT)     Dose/Frequency 320-25 mg tablet. Take one tablet by mouth daily at bedtime    Quantity 90  Verified pharmacy   [x]  Verified ordering Provider   [x]  Does patient have enough for the next 3 days? Yes [x] No []    Pharmacy was able to give patient three more pill to last three days.   Reason for Disposition  • [1] Caller requesting a prescription renewal (no refills left), no triage required, AND [2] triager able to renew prescription per department policy    Answer Assessment - Initial Assessment Questions  1. DRUG NAME: \"What medicine do you need to have refilled?\"      Diovan    2. REFILLS REMAINING: \"How many refills are remaining?\" (Note: The label on the medicine or pill bottle will show how many refills are remaining. If there are no refills remaining, then a renewal may be needed.)      0    Protocols used: Medication Refill and Renewal Call-Adult-    "

## 2024-10-14 LAB
LEFT EYE DIABETIC RETINOPATHY: NORMAL
RIGHT EYE DIABETIC RETINOPATHY: NORMAL
SEVERITY (EYE EXAM): NORMAL

## 2024-10-15 DIAGNOSIS — I10 BENIGN ESSENTIAL HYPERTENSION: ICD-10-CM

## 2024-10-15 RX ORDER — VALSARTAN AND HYDROCHLOROTHIAZIDE 320; 25 MG/1; MG/1
1 TABLET, FILM COATED ORAL
Qty: 30 TABLET | Refills: 5 | Status: SHIPPED | OUTPATIENT
Start: 2024-10-15

## 2024-10-16 ENCOUNTER — TELEPHONE (OUTPATIENT)
Dept: FAMILY MEDICINE CLINIC | Facility: CLINIC | Age: 84
End: 2024-10-16

## 2024-10-17 ENCOUNTER — OFFICE VISIT (OUTPATIENT)
Dept: FAMILY MEDICINE CLINIC | Facility: CLINIC | Age: 84
End: 2024-10-17
Payer: COMMERCIAL

## 2024-10-17 VITALS
WEIGHT: 191.8 LBS | TEMPERATURE: 97.5 F | DIASTOLIC BLOOD PRESSURE: 84 MMHG | HEIGHT: 67 IN | SYSTOLIC BLOOD PRESSURE: 142 MMHG | OXYGEN SATURATION: 99 % | HEART RATE: 57 BPM | BODY MASS INDEX: 30.1 KG/M2

## 2024-10-17 DIAGNOSIS — M16.11 OSTEOARTHRITIS OF RIGHT HIP, UNSPECIFIED OSTEOARTHRITIS TYPE: ICD-10-CM

## 2024-10-17 DIAGNOSIS — N40.1 BENIGN PROSTATIC HYPERPLASIA WITH LOWER URINARY TRACT SYMPTOMS, SYMPTOM DETAILS UNSPECIFIED: ICD-10-CM

## 2024-10-17 DIAGNOSIS — N18.30 STAGE 3 CHRONIC KIDNEY DISEASE, UNSPECIFIED WHETHER STAGE 3A OR 3B CKD (HCC): Primary | ICD-10-CM

## 2024-10-17 DIAGNOSIS — R32 URINARY INCONTINENCE, UNSPECIFIED TYPE: ICD-10-CM

## 2024-10-17 DIAGNOSIS — I10 BENIGN ESSENTIAL HYPERTENSION: ICD-10-CM

## 2024-10-17 DIAGNOSIS — E11.22 TYPE 2 DIABETES MELLITUS WITH CHRONIC KIDNEY DISEASE, WITHOUT LONG-TERM CURRENT USE OF INSULIN, UNSPECIFIED CKD STAGE (HCC): ICD-10-CM

## 2024-10-17 DIAGNOSIS — E78.2 MIXED HYPERLIPIDEMIA: ICD-10-CM

## 2024-10-17 DIAGNOSIS — I25.10 ARTERIOSCLEROSIS OF CORONARY ARTERY: ICD-10-CM

## 2024-10-17 DIAGNOSIS — Z12.11 ENCOUNTER FOR SCREENING COLONOSCOPY: ICD-10-CM

## 2024-10-17 DIAGNOSIS — Z12.12 ENCOUNTER FOR COLORECTAL CANCER SCREENING: ICD-10-CM

## 2024-10-17 DIAGNOSIS — Z12.11 ENCOUNTER FOR COLORECTAL CANCER SCREENING: ICD-10-CM

## 2024-10-17 LAB — SL AMB POCT HEMOGLOBIN AIC: 6.2 (ref ?–6.5)

## 2024-10-17 PROCEDURE — 99214 OFFICE O/P EST MOD 30 MIN: CPT | Performed by: FAMILY MEDICINE

## 2024-10-17 PROCEDURE — 83036 HEMOGLOBIN GLYCOSYLATED A1C: CPT | Performed by: FAMILY MEDICINE

## 2024-10-17 NOTE — PROGRESS NOTES
Ambulatory Visit  Name: Gadiel Gallegos      : 1940      MRN: [id number]  Encounter Provider: Tru Dumont DO  Encounter Date: 10/17/2024   Encounter department: [facility]    Assessment & Plan  Type 2 diabetes mellitus with chronic kidney disease, without long-term current use of insulin, unspecified CKD stage (HCC)  Rapid A1c today in office 6.2%.  Patient continues to do extremely well with diet control.  Continue reduced carb diet and exercise.  Will continue to monitor  Lab Results   Component Value Date    HGBA1C 6.2 10/17/2024       Orders:    POCT hemoglobin A1c    Albumin / creatinine urine ratio; Future    Stage 3 chronic kidney disease, unspecified whether stage 3a or 3b CKD (HCC)  Lab Results   Component Value Date    EGFR 44 10/17/2023    EGFR 41 05/10/2023    EGFR 40 2022    CREATININE 1.47 (H) 10/17/2023    CREATININE 1.54 (H) 05/10/2023    CREATININE 1.58 (H) 2022   Has been stable.  Patient due for labs.  Will call with results         Benign essential hypertension  Reasonably controlled on valsartan /25 and amlodipine 2.5       Mixed hyperlipidemia  Continue low-fat diet exercise.  Patient is due for labs in near future.  Will call with results       Arteriosclerosis of coronary artery  History of RCA and circumflex disease.  Patient continues to do well without chest pain or shortness of breath.  His cardiologist has advised him he no longer needs to follow him.       Osteoarthritis of right hip, unspecified osteoarthritis type  Status post right total hip arthroplasty.  Stable       Benign prostatic hyperplasia with lower urinary tract symptoms, symptom details unspecified  Stable on tamsulosin and finasteride       Urinary incontinence, unspecified type  Stable on oxybutynin XL 10       Encounter for colorectal cancer screening         Encounter for screening colonoscopy            Last tetanus booster   Patient had Shingrix  "vaccination  Current with pneumonia vaccination  Patient refuses flu, COVID, RSV vaccines    Rapid A1c in office today 6.2%.  Patient advised to get remainder of labs drawn in near future.  Will call with results    6 months, will need CMP/A1c prior      History of Present Illness     Patient presents for recheck of chronic medical problems.  Compliant with prescribed medications.      Review of Systems   Respiratory: Negative.     Cardiovascular: Negative.    Gastrointestinal: Negative.    Genitourinary: Negative.      Past Medical History:   Diagnosis Date    Abnormal blood chemistry     Last Assessed: 7/19/2013    Abnormal glucose     Last Assessed: 11/6/2015    Arthritis     Bladder tumor     BPH (benign prostatic hypertrophy)     Cataracts, bilateral     \"start of\"    Coronary artery disease     2 stents. MI x2    Diabetes mellitus (HCC)     Diet controlled pre-diabetic    Erectile dysfunction of non-organic origin     Last Assessed: 5/9/2014    Fatigue     Resolved: 2/23/2015    Glaucoma     \"Start of\"    Hematuria, microscopic     Last Assessed: 11/7/2016    History of bladder stone     Hyperlipidemia     Hypertension     Knee pain, bilateral     If walking far. No assistive devices    Myocardial infarction (HCC) 2011, 2012    x2    Primary insomnia     Last Assessed; 7/20/2016    Recurrent right inguinal hernia     Last Assessed: 2/23/2015    Urinary incontinence     Urinary urgency     Last Assessed: 10/30/2015    Wears glasses      Past Surgical History:   Procedure Laterality Date    BLADDER STONE REMOVAL      Laser procedure    CARDIAC CATHETERIZATION  2003    RCA stenting and 2004 stenting to circuflex    COLONOSCOPY      CORONARY ANGIOPLASTY WITH STENT PLACEMENT      Has 2 stents    CYSTOSCOPY  11/11/2015    w/ Cystolitholapaxy    HERNIA REPAIR Bilateral 03/05/2015    Inguinal    JOINT REPLACEMENT Bilateral     TKR    JOINT REPLACEMENT Right     Right THR    ID CYSTO W/REMOVAL OF LESIONS SMALL N/A " 2/8/2017    Procedure: CYSTOSCOPY WITH BIOPSIES WITH FULGURATION, INSTILLATION OF MYTOMYCIN, DIALATION OF MEATUS ;  Surgeon: Steven Fisher MD;  Location: [facility];  Service: Urology    WISDOM TOOTH EXTRACTION       Family History   Problem Relation Age of Onset    No Known Problems Mother         Diabetes per [employer]    No Known Problems Father     Skin cancer Brother     Diabetes Brother     Bipolar disorder Other     Diabetes Family     Hypertension Family     Substance Abuse Neg Hx     Alcohol abuse Neg Hx      Social History     Tobacco Use    Smoking status: Former     Types: Cigarettes, Pipe    Smokeless tobacco: Never    Tobacco comments:     Quit 50 years ago and only smoked for 1 year   Vaping Use    Vaping status: Never Used   Substance and Sexual Activity    Alcohol use: Yes     Alcohol/week: 2.0 standard drinks of alcohol     Types: 2 Shots of liquor per week     Comment: 2 shots of liquor weekly    Drug use: No    Sexual activity: Not on file     Current Outpatient Medications on File Prior to Visit   Medication Sig    acetaminophen (TYLENOL) 325 mg tablet Take 650 mg by mouth every 6 (six) hours as needed for mild pain    amLODIPine (NORVASC) 2.5 mg tablet Take 1 tablet (2.5 mg total) by mouth daily at bedtime    aspirin (ECOTRIN) 325 mg EC tablet Take 1 tablet by mouth    cholecalciferol (VITAMIN D3) 1,000 units tablet Take by mouth    finasteride (PROSCAR) 5 mg tablet Take 1 tablet (5 mg total) by mouth daily    fluticasone (FLONASE) 50 mcg/act nasal spray 2 sprays into each nostril daily for 30 days (Patient taking differently: 2 sprays into each nostril if needed)    ketoconazole (NIZORAL) 2 % cream     nitroglycerin (NITROSTAT) 0.4 mg SL tablet Place 1 tablet (0.4 mg total) under the tongue every 5 (five) minutes as needed for chest pain    Omega-3 Fatty Acids (OMEGA 3 PO) Take 2 capsules by mouth daily    oxybutynin (DITROPAN-XL) 10 MG 24 hr tablet Take 1 tablet (10 mg total) by  "mouth daily at bedtime    Red Yeast Rice Extract (RED YEAST RICE PO) Take 2 capsules by mouth daily    tamsulosin (FLOMAX) 0.4 mg Take 1 capsule (0.4 mg total) by mouth 2 (two) times a day    valsartan-hydrochlorothiazide (DIOVAN-HCT) 320-25 MG per tablet Take 1 tablet by mouth daily at bedtime     Allergies   Allergen Reactions    Penicillin G Facial Swelling     Immunization History   Administered Date(s) Administered    COVID-19 MODERNA VACC 0.5 ML IM 03/24/2021, 04/23/2021, 12/22/2021    INFLUENZA 09/04/2018, 09/03/2020    Influenza Split High Dose Preservative Free IM 08/30/2017    Influenza, high dose seasonal 0.7 mL 10/08/2021, 10/10/2022    Pneumococcal Conjugate 13-Valent 03/20/2015    Pneumococcal Polysaccharide PPV23 07/19/2013    Tdap 06/12/2024    Zoster 01/01/2013    Zoster Vaccine Recombinant 12/12/2019, 02/26/2020    influenza, trivalent, adjuvanted 09/30/2019     Objective     /84   Pulse 57   Temp 97.5 °F (36.4 °C) (Temporal)   Ht 5' 7\" (1.702 m)   Wt 87 kg (191 lb 12.8 oz)   SpO2 99%   BMI 30.04 kg/m²     Physical Exam  Constitutional:       Appearance: Normal appearance.   Eyes:      Pupils: Pupils are equal, round, and reactive to light.   Neck:      Vascular: No carotid bruit.   Cardiovascular:      Rate and Rhythm: Normal rate and regular rhythm.      Pulses: Normal pulses.      Heart sounds: Normal heart sounds. No murmur heard.     No gallop.   Pulmonary:      Effort: Pulmonary effort is normal.      Breath sounds: Normal breath sounds.   Neurological:      Mental Status: He is alert.   Psychiatric:         Mood and Affect: Mood normal.         Behavior: Behavior normal.         "

## 2024-10-17 NOTE — ASSESSMENT & PLAN NOTE
Lab Results   Component Value Date    EGFR 44 10/17/2023    EGFR 41 05/10/2023    EGFR 40 04/13/2022    CREATININE 1.47 (H) 10/17/2023    CREATININE 1.54 (H) 05/10/2023    CREATININE 1.58 (H) 04/13/2022   Has been stable.  Patient due for labs.  Will call with results

## 2024-10-17 NOTE — ASSESSMENT & PLAN NOTE
Continue low-fat diet exercise.  Patient is due for labs in near future.  Will call with results

## 2024-10-17 NOTE — ASSESSMENT & PLAN NOTE
Rapid A1c today in office 6.2%.  Patient continues to do extremely well with diet control.  Continue reduced carb diet and exercise.  Will continue to monitor  Lab Results   Component Value Date    HGBA1C 6.2 10/17/2024       Orders:    POCT hemoglobin A1c    Albumin / creatinine urine ratio; Future

## 2024-10-17 NOTE — ASSESSMENT & PLAN NOTE
History of RCA and circumflex disease.  Patient continues to do well without chest pain or shortness of breath.  His cardiologist has advised him he no longer needs to follow him.

## 2024-10-18 ENCOUNTER — TELEPHONE (OUTPATIENT)
Dept: ADMINISTRATIVE | Facility: OTHER | Age: 84
End: 2024-10-18

## 2024-10-18 NOTE — TELEPHONE ENCOUNTER
Upon review of the In Basket request and the patient's chart, initial outreach has been made via fax to facility. Please see Contacts section for details.     X1 att    Thank you  Norma Nguyen

## 2024-10-18 NOTE — LETTER
Diabetic Eye Exam Form    Date Requested: 10/18/24  Patient: Gadiel Gallegos  Patient : 1940   Referring Provider: Tru Dumont,       DIABETIC Eye Exam Date _______________________________      Type of Exam MUST be documented for Diabetic Eye Exams. Please CHECK ONE.     Retinal Exam       Dilated Retinal Exam       OCT       Optomap-Iris Exam      Fundus Photography       Left Eye - Please check Retinopathy or No Retinopathy        Exam did show retinopathy    Exam did not show retinopathy       Right Eye - Please check Retinopathy or No Retinopathy       Exam did show retinopathy    Exam did not show retinopathy       Comments __________________________________________________________    Practice Providing Exam ______________________________________________    Exam Performed By (print name) _______________________________________      Provider Signature ___________________________________________________      These reports are needed for  compliance.  Please fax this completed form and a copy of the Diabetic Eye Exam report to our office located at [address] [place] [address] as soon as possible via Fax [phone] attention Norma: Phone [phone]  We thank you for your assistance in treating our mutual patient.

## 2024-10-18 NOTE — TELEPHONE ENCOUNTER
----- Message from Gayle MARIE sent at 10/18/2024  8:37 AM EDT -----  Regarding: Quality Outreach  10/18/24 8:37 AM    Hello, our patient Gadiel Gallegos has had Diabetic Eye Exam completed/performed. Please assist in updating the patient chart by making an External outreach to [facility] facility located in [place], [place]. The date of service is March or April 2024.    Thank You,    Gayle Mtz PG [facility]

## 2024-10-22 DIAGNOSIS — N40.1 BENIGN PROSTATIC HYPERPLASIA WITH LOWER URINARY TRACT SYMPTOMS, SYMPTOM DETAILS UNSPECIFIED: ICD-10-CM

## 2024-10-23 RX ORDER — TAMSULOSIN HYDROCHLORIDE 0.4 MG/1
0.4 CAPSULE ORAL 2 TIMES DAILY
Qty: 180 CAPSULE | Refills: 1 | Status: SHIPPED | OUTPATIENT
Start: 2024-10-23

## 2024-10-25 NOTE — TELEPHONE ENCOUNTER
As a follow-up, a second attempt has been made for outreach via fax to facility. Please see Contacts section for details.    X2 eye    Thank you  Norma Nguyen

## 2024-11-01 NOTE — TELEPHONE ENCOUNTER
Last OV 10/10/22    Next OV 4/10/23 [facility] ACUTE PAIN SERVICE CONSULTATION   [facility], [facility], [facility], [facility], [place]     Date of Admission:  10/31/2024  Date of Consult (When I saw the patient): 11/01/24       Assessment/Plan:     Onur Barr is a 70 year old male who was admitted on 10/31/2024.  Pain team was asked to see the patient for complex pain management. Admitted for  acute non-traumatic lower back pain, and is found to have acute pathologic lumbar spine fracture. History of  prior spine surgery, chronic pain status post intrathecal pump placement, CAD, ventricular tachycardia status post ICD placement, atrial fibrillation status post JUSTO occlusion, and CLL.  Describes pain as 7-10/10 and aching, sharp, stabbing in the low back and does radiate to BLEs. The patient does not smoke and denies chemical dependency history.     PLAN:   1) Pain is consistent with acute on chronic back pain, fractue pain in the setting of chronic pain, chronic opioid use, intrathecal pain pump.     Multimodal Medication Therapy  Topical: lidocaine patch x2, add voltaren gel qid, add nasal calcitonin x2 weeks    NSAID'S: Estimated Creatinine Clearance: 99.8 mL/min (based on SCr of 0.96 mg/dL).   Muscle Relaxants: Flexeril 10 mg tid prn - discontinue and trial robaxin qid   Adjuvants: APAP 975 mg tid, add Gabapentin 200 mg bid,   Antidepressants/anxiolytics: Cymbalta 30 mg qAM and 60 mg qPM, trazodone 200 mg at bedtime   Opioids: Oxycodone 5-10 mg q4h prn - discontinue and order dilaudid 2-4 mg q4h prn   IV Pain medication: dilaudid 0.2-0.4 mg q4h prn. Per Hospital Medicine Service dilaudid 2 mg once prior to MRI, Lorazepam 1 mg once prior to MRI  Non-medication interventions: Ice, Rest, PT, OT, Distraction (TV, Music, Reading), and brace per NRS  Constipation Prophylaxis: Bisacodyl Suppository and Senna-docusate  Intrathecal pain pump:  Drug # 1: Morphine (Duramorph or Infumorph)  - Conc:20 mg/mL - Total Dose / 24 hours: 4.342 mg      Drug #  "2: Clonidine (Duraclon)  - Conc:21.1 mcg/mL - Total Dose / 24 hours: 4.580 mcg      Drug # 3: Baclofen (Lioresal) - Conc: 42.5 mcg/mL - Total Dose / 24 hours: 9.226 mcg      Diluent: NS      Infusion Rate: 0.2171 mL/24 hrs   Pump Reservoir Volume: 40 mL  Current volume: 15.4 mL    Outside Clinic & Provider: [facility] [phone]   Last Refill Date: 07/12/2024   Next Refill Date: 01/01/2025   Low [place] Alarm Date: 01/01/2025   Pump : E[employer] Synchromed II     -Opioid prescriber has been Beronica Riojas [facility] [address]  [place] [id number]  -[place]  pulled from system on 11/1/24. This indicates Morphine powder and Percocet   10/30/24 Percocet 5-325 mg #12 for 3 days by Jumana Copeland  7/9/24 Morphine powder by Beronica Riojas - same on 01/23/24  Discharge Recommendations - We recommend prescribing the following at the time of discharge:  TBD     History of Present Illness (HPI):       Onur Barr is a 70 year old male who presented for  acute non-traumatic lower back pain, and is found to have acute pathologic lumbar spine fracture.  Past medical history as above. The pain is reported to be acute, chronic, located in the low back, and it does radiate to BLEs.  Current pain is rated at 7-10/10 and goal is 2-3/10.  The patient denies nausea, vomiting, constipation, diarrhea, chest pain, shortness of breath, dizziness, fever, chills, paresthesia, saddle anesthesia, and changes to bowel or bladder habits.     Per [place]  review, the patient does have an opioid tolerance. Opioid induced side effects noted and include: none    Reviewed medical record, labs, imaging, ED note, and care everywhere. Reviewed NRS consult note from 11/1/24:  Patient states that 10 days ago he was stepping out of his trucked when he felt a \"clicking\" in his lower back.  The back pain started shortly thereafter and has become constant.  He also noted radicular pain in the posterior aspect of both legs. "  Denies change in bowel or bladder function.  Given the uncontrolled pain, patient initially presented to ED at [facility] on 10/30/2024, a CT was ordered and patient could not tolerate and patient elected to discharge home with medrol dose pack.  He represented on 10/31/2024 and at that time a lumbar CT was successfully obtained which revealed a fracture involving the L5 vertebral body and the posterior elements.  Patient was subsequently transferred to [facility] for further evaluation and treatment.  Currently, the patient states he is experiencing severe low back pain with radiation to the bilateral lower extremities.  He states he will not be able to tolerated lying flat for MRI or CT myelogram imaging.      Home pain medications/psych medications/anticoagulation medications include: APAP 650 ng q4h prn, ASA 81 mg daily, duloxetine 30 mg qAM and 60 g qPM, intrathecal pain pump, Trazodone 200 mg at bedtime, Percocet 5-325 mg q6h prn     Visit/Communication Style   Visit/Communication Style   Virtual (Video) communication was used to evaluate Onur.  Onur consented to the use of video communication.  Video START time: 1300, 11/1/2024  Video STOP time: 1421, 11/1/2024   Patient's location: [facility]  Provider's location during the visit: [facility][1]               Medical History   PAST MEDICAL HISTORY:   Past Medical History:   Diagnosis Date    Bariatric surgery status 06/23/2008    Formatting of this note might be different from the original. Created by Conversion    Bilateral leg edema 07/13/2021    Chronic Back Pain (IT Pump w Morphine, Clonidine, Baclofen) 01/16/2022    Chronic diastolic heart failure (H) 07/26/2021    Claudication of lower extremity (H) 11/20/2019    CLL (chronic lymphocytic leukemia) (H)     Coronary artery disease     CABG 1-    Depressive disorder     Essential hypertension     Created by [employer] [facility] Annotation: Apr 6 2012 10:16AM  Zack Brewer: Lisinipril,  coreg  Replacement Utility updated for latest IMO load    Gastroesophageal reflux disease without esophagitis 09/11/2021    H/O gastric bypass 2008    History of blood transfusion 2004    ICD (implantable cardioverter-defibrillator) in place 01/25/2022    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    Ischemic cardiomyopathy     Lumbago     Created by [facility] Annotation: Apr 6 2012 10:20AM - Anh Dickson: Morphine pump     Mixed hyperlipidemia 06/23/2008    Morbid obesity (H) 08/01/2018    Nephrolithiasis     NSTEMI (non-ST elevated myocardial infarction) (H)     MARLEEN (doesn't tolerate CPAP) 07/26/2021    Osteoarthritis     Created by [facility] Annotation: Jun 26 2009  9:53AM Zack Brewer: failed lumbar  fusion/morphine pump dr putnam  Replacement Utility updated for latest IMO load    Paroxysmal atrial fib -- S/P Pulm Vein Ablation 1/19/22 09/11/2021    Formatting of this note might be different from the original. Created by Conversion    Paroxysmal ventricular tachycardia (H)     dual chamber ICD 1/24/2022    Peripheral vascular disease (H) 05/03/2022    Post-laminectomy syndrome 11/25/2015    S/P CABG (coronary artery bypass graft) 01/25/2022    Type 2 diabetes mellitus (H)     Diet controlled after Gastric Bypass in 2008       PAST SURGICAL HISTORY:   Past Surgical History:   Procedure Laterality Date    BACK SURGERY      BYPASS GASTRIC DUODENAL SWITCH      BYPASS GRAFT ARTERY CORONARY N/A 01/19/2022    Procedure: CORONARY ARTERY BYPASS GRAFT (CABG) X1; LIMA -LAD.  PULMONARY VEIN ISOLATION, AND ATRIAL APPENDAGE CLIPPING USING 45MM ACTICURE CLIP.;  Surgeon: William Dumont MD;  Location: [facility] OR    COLONOSCOPY      COSMETIC SURGERY      pannicullectomy    CV CORONARY ANGIOGRAM N/A 09/11/2021    Procedure: Coronary Angiogram;  Surgeon: Noris Ozuna MD;  Location: [facility] CATH LAB CV    CV HEART  CATHETERIZATION WITH POSSIBLE INTERVENTION N/A 01/13/2022    Procedure: Heart Catheterization with Possible Intervention;  Surgeon: Liz Pearl MD;  Location:  HEART CARDIAC CATH LAB    CV LEFT HEART CATH N/A 09/11/2021    Procedure: Left Heart Cath;  Surgeon: Noris Ozuna MD;  Location: [facility] LAB CV    CV PCI N/A 09/11/2021    Procedure: Percutaneous Coronary Intervention;  Surgeon: Noris zOuna MD;  Location: [facility] LAB CV    CV PCI N/A 10/07/2021    Procedure: Percutaneous Coronary Intervention;  Surgeon: Mckayla Dan MD;  Location: [facility] CV    CV PCI ASPIRATION THROMECTOMY N/A 09/11/2021    Procedure: Percutaneous Coronary Intervention Aspiration Thrombectomy;  Surgeon: Noris Ozuna MD;  Location: [facility]    ENT SURGERY      tonsillectomy    EP ICD N/A 01/24/2022    Procedure: EP ICD;  Surgeon: Jannette Crook MD;  Location:  HEART CARDIAC CATH LAB    EXTRACORPOREAL SHOCK WAVE LITHOTRIPSY, CYSTOSCOPY, INSERT STENT URETER(S), COMBINED  08/23/2011    HC REMOVAL OF TONSILS,<13 Y/O      Description: Tonsillectomy;  Recorded: 03/23/2012;  Comments: for obstructive sleep apnea    HERNIA REPAIR      IR MISCELLANEOUS PROCEDURE  07/29/2011    IR MISCELLANEOUS PROCEDURE  08/05/2011    IR MISCELLANEOUS PROCEDURE  08/23/2011    IR NEPHROSTOMY TUBE CHANGE BILATERAL  08/05/2011    ORTHOPEDIC SURGERY      arthrodesis ant discectomy, lumbar    AR ARTHRODESIS ANT INTERBODY MIN DISCECTOMY,LUMBAR      Description: Lumbar Vertebral Fusion;  Recorded: 06/26/2009;  Comments: before 200 see [facility] consult under old records    AR EXCISE EXCESS SKIN TISSUE,ABDOMEN  11/13/2012    Description: Panniculectomy;  Proc Date: 11/13/2012;  Comments: 3.5 Lb Pannus was removed at the [facility] By Dr. Shon Green    REPLACE INTRATHECAL PAIN PUMP N/A 04/25/2017    Procedure: REPLACE INTRATHECAL PAIN PUMP;  INTRATHECAL PAIN PUMP CATHETER REPLACEMENT AND PUMP REPLACEMENT;  Surgeon:  Anthony Maloney MD;  Location:  [facility]    REPLACE INTRATHECAL PAIN PUMP N/A 4/20/2023    Procedure: Pump Replacement and intrathecal catheter replacement;  Surgeon: Anthony Dick MD;  Location:  OR    REVISE CATHETER INTRATHECAL N/A 04/25/2017    Procedure: REVISE CATHETER INTRATHECAL;;  Surgeon: Anthony Maloney MD;  Location:  [facility]    SHOULDER SURGERY      Z GASTRIC BYPASS,OBESE<100CM LORA-EN-Y  01/01/2008    Description: Gastric Surgery For Morbid Obesity Bypass With Lora-en-Y;  Recorded: 06/26/2009;  Comments: dr green 2008    [facility] REPAIR INCISIONAL HERNIA,REDUCIBLE  11/13/2012    Description: Incisional Hernia Repair;  Proc Date: 11/13/2012;  Comments: incisional hernia repair and abdominal panniculectomy by Dr Shon Green at the [facility].       FAMILY HISTORY:   Family History   Problem Relation Age of Onset    Cerebrovascular Disease Mother     Heart Disease Father     Coronary Artery Disease Father     Hypertension Father     Hyperlipidemia Father     Hodgkin's lymphoma Brother     Breast Cancer Sister     Other Cancer Sister     Other Cancer Daughter         Carcinoide Tumor 2021       SOCIAL HISTORY:   Social History     Tobacco Use    Smoking status: Former     Types: Cigars     Passive exposure: Never    Smokeless tobacco: Never    Tobacco comments:     Haven't smoked in years   Substance Use Topics    Alcohol use: No        HEALTH & LIFESTYLE PRACTICES  Tobacco:  reports that he has quit smoking. His smoking use included cigars. He has never been exposed to tobacco smoke. He has never used smokeless tobacco.  Alcohol:  reports no history of alcohol use.  Illicit drugs:  reports no history of drug use.    Allergies  Allergies   Allergen Reactions    Hydrocodone-Acetaminophen Other (See Comments)     sneezing       Problem List  Patient Active Problem List    Diagnosis Date Noted    Closed compression fracture of lumbosacral spine (H) 10/31/2024     Priority: Medium    CLL (chronic  lymphocytic leukemia) (H) 02/28/2024     Priority: Medium    Candidal intertrigo 01/31/2023     Priority: Medium    Insomnia 05/09/2022     Priority: Medium     Formatting of this note might be different from the original.  Created by [facility]  [employer] [facility] Annotation: Sep 11 2013  9:56Zack Harris: Trouble   maintaining sleep-Trazodone-minimal helpzolpidem-      Nephrolithiasis 05/09/2022     Priority: Medium    Osteoarthrosis 05/09/2022     Priority: Medium     Formatting of this note might be different from the original.  Created by [facility] Annotation: Jun 26 2009  9:53AM - Zack Gutierrez: failed lumbar   fusion/morphine pump dr putnam    Replacement Utility updated for latest IMO load      Coronary arteriosclerosis      Priority: Medium     Formatting of this note might be different from the original.  Created by Conversion    Replacement Utility updated for latest IMO load      Peripheral vascular disease (H) 05/03/2022     Priority: Medium    S/P CABG (coronary artery bypass graft) 01/25/2022     Priority: Medium    Fluid overload 01/25/2022     Priority: Medium    ICD (implantable cardioverter-defibrillator) in place 01/25/2022     Priority: Medium    Chronic Back Pain (IT Pump w Morphine, Clonidine, Baclofen) 01/16/2022     Priority: Medium    Paroxysmal atrial fib -- S/P Pulm Vein Ablation 1/19/22 09/11/2021     Priority: Medium     Formatting of this note might be different from the original.  Created by Conversion      Gastroesophageal reflux disease without esophagitis 09/11/2021     Priority: Medium    NSTEMI w Unstab Angina -- S/P CABG x 1 (LIMA to LAD) on 1/19/22      Priority: Medium    Chronic diastolic heart failure (H) 07/26/2021     Priority: Medium    MARLEEN (doesn't tolerate CPAP) 07/26/2021     Priority: Medium    Morbid obesity -- BMI 42.0 07/13/2021     Priority: Medium    Bilateral leg edema 07/13/2021     Priority: Medium    Claudication of lower extremity (H)  11/20/2019     Priority: Medium    Sleepiness 05/08/2018     Priority: Medium    Post-laminectomy syndrome 11/25/2015     Priority: Medium    Abdominal panniculus 11/13/2012     Priority: Medium     Formatting of this note might be different from the original.  S/p panniculectomy 11/13/2012      Lumbago 10/18/2011     Priority: Medium    Essential hypertension 06/23/2008     Priority: Medium     Formatting of this note might be different from the original.  Created by Conversion  [facility] Annotation: Apr 6 2012 10:Zack Cutler: Lisinipril,   coreg    Replacement Utility updated for latest IMO load      Mixed hyperlipidemia 06/23/2008     Priority: Medium    Bariatric surgery status 06/23/2008     Priority: Medium     Formatting of this note might be different from the original.  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     Priority: Medium       Prior to Admission Medications   Medications Prior to Admission   Medication Sig Dispense Refill Last Dose/Taking    acetaminophen (TYLENOL) 325 MG tablet Take 2 tablets (650 mg) by mouth every 4 hours as needed for mild pain 40 tablet 0 Taking As Needed    aspirin (ASA) 81 MG chewable tablet Take 81 mg by mouth daily   Taking    DULoxetine (CYMBALTA) 30 MG capsule Take 1 capsule (30mg) every morning and 2 capsules (60mg) every evening 270 capsule 3 Taking    FERATE 240 (27 Fe) MG TABS TAKE 1 TABLET BY MOUTH EVERY DAY 90 tablet 2 Taking    furosemide (LASIX) 40 MG tablet Take 1 tablet (40 mg) by mouth 2 times daily 180 tablet 3 Taking    ketoconazole (NIZORAL) 2 % external cream Apply topically 2 times daily. to affected area 60 g 4 Taking    lisinopril (ZESTRIL) 10 MG tablet Take 1 tablet (10 mg) by mouth daily 90 tablet 3 Taking    medication given by implanted intrathecal pump continuously. Updated by PharmD 10/31/24    Drug # 1: Morphine (Duramorph or Infumorph)  - Conc:20 mg/mL - Total  Dose / 24 hours: 4.342 mg    Drug # 2: Clonidine (Duraclon)  - Conc:21.1 mcg/mL - Total Dose / 24 hours: 4.580 mcg    Drug # 3: Baclofen (Lioresal) - Conc: 42.5 mcg/mL - Total Dose / 24 hours: 9.226 mcg    Diluent: NS    Infusion Rate: 0.2171 mL/24 hrs  Pump Reservoir Volume: 40 mL  Outside Clinic & Provider: [facility] [phone]  Last Refill Date: 07/12/2024  Next Refill Date: 01/01/2025  Low [place] Alarm Date: 01/01/2025  Pump : [employer]onic Synchromed II    Please consider consulting the pain team if the patient is admitted with an IT pump.   Taking    metoprolol succinate ER (TOPROL XL) 100 MG 24 hr tablet Take 1 tablet (100 mg) by mouth 2 times daily 180 tablet 3 Taking    nitroGLYcerin (NITROSTAT) 0.4 MG sublingual tablet For chest pain place 1 tablet under the tongue every 5 minutes for 3 doses. If symptoms persist 5 minutes after 1st dose call 911. 30 tablet 1 Taking    ondansetron (ZOFRAN ODT) 4 MG ODT tab Take 1 tablet (4 mg) by mouth every 8 hours as needed for nausea 100 tablet 1 Taking As Needed    oxyCODONE-acetaminophen (PERCOCET) 5-325 MG tablet Take 1 tablet by mouth every 6 hours as needed for pain. 12 tablet 0 Taking As Needed    rosuvastatin (CRESTOR) 40 MG tablet Take 1 tablet (40 mg) by mouth daily. 90 tablet 0 Taking    sotalol (BETAPACE) 80 MG tablet Take 1 tablet (80 mg) by mouth 2 times daily 180 tablet 3 Taking    traZODone (DESYREL) 100 MG tablet Take 2 tablets (200 mg) by mouth at bedtime TAKE 2 TABLETS (200MG TOTAL) BY MOUTH AT BEDTIME Strength: 100 mg 180 tablet 3 Taking       Review of Systems  Complete ROS reviewed, unless noted in HPI, all other systems reviewed (with patient) and all others found to be negative.      Objective:     Physical Exam:  BP (!) 148/91 (BP Location: Right arm)   Pulse 66   Temp 97.5  F (36.4  C) (Oral)   Resp 18   SpO2 99%   Weight:   There were no vitals filed for this visit.   There is no height or weight on file to  calculate BMI.    General Appearance:  Alert, cooperative, appears uncomfortable, resting in bed   Head:  Normocephalic, without obvious abnormality, atraumatic   Eyes:  PERRL, conjunctiva/corneas clear, EOM's intact   ENT/Throat: Lips, mucosa, and tongue normal; teeth and gums normal   Lymph/Neck: Supple, symmetrical, trachea midline   Lungs:   Respirations unlabored, room air    Chest Wall:  No tenderness or deformity   Musculoskeletal: Extremities normal, atraumatic   Neurologic: Alert and oriented X 3, Moves all 4 extremities     Psych: Affect is appropriate      Imaging: Reviewed I have personally reviewed pertinent notes, labs, tests, and radiologic imaging in patient's chart.  Labs: Reviewed I have personally reviewed pertinent notes, labs, tests, and radiologic imaging in patient's chart.  Notes: Reviewed I have personally reviewed pertinent notes, labs, tests, and radiologic imaging in patient's chart.    Total time spent 65 minutes with greater than 50% in consultation, education and coordination of care.   Treatment plan includes: multimodal pain approach, Hospital Medicine Service for medical management, NRS.   Patient educated regarding: multimodal pain approach and medications as listed above.   Elements of Medical Decision Making as described above. Acute or chronic illness or injury or surgery. High risk therapy including opioids, high risk drug therapy including oral and/or parenteral controlled substances.    Patient is understanding of the plan. All questions and concerns addressed to patient's satisfaction.     Thank you for this consultation.    Yumiko CARSON, NILESHP-C  Acute Care Pain Management Program   [facility]   Monday-Friday 8a-4p   Page via Epic or [employer]

## 2024-11-04 NOTE — TELEPHONE ENCOUNTER
As a final attempt, a third outreach has been made via telephone call to facility. Please see Contacts section for details. This encounter will be closed and completed by end of day. Should we receive the requested information because of previous outreach attempts, the requested patient's chart will be updated appropriately.     Thank you  Norma Nguyen

## 2024-11-05 NOTE — TELEPHONE ENCOUNTER
Upon review of the In Basket request we were able to locate, review, and update the patient chart as requested for Diabetic Eye Exam.    Any additional questions or concerns should be emailed to the Practice Liaisons via the appropriate education email address, please do not reply via In Basket.    Thank you  Norma Nguyen   [facility]

## 2024-11-22 ENCOUNTER — TELEPHONE (OUTPATIENT)
Age: 84
End: 2024-11-22

## 2024-11-22 NOTE — TELEPHONE ENCOUNTER
Spoke with patient regarding moving follow up  appointment up from 4/7/25 to 11/25 @ 9:00 or 3:00 w/ Dr. Edmonds , if available.    Pt advised he has to check with his wife and will call back.

## 2024-11-26 DIAGNOSIS — I10 BENIGN ESSENTIAL HYPERTENSION: ICD-10-CM

## 2024-11-26 RX ORDER — VALSARTAN AND HYDROCHLOROTHIAZIDE 320; 25 MG/1; MG/1
1 TABLET, FILM COATED ORAL
Qty: 90 TABLET | Refills: 1 | Status: SHIPPED | OUTPATIENT
Start: 2024-11-26

## 2024-12-02 ENCOUNTER — OFFICE VISIT (OUTPATIENT)
Age: 84
End: 2024-12-02
Payer: COMMERCIAL

## 2024-12-02 VITALS
HEART RATE: 87 BPM | DIASTOLIC BLOOD PRESSURE: 88 MMHG | TEMPERATURE: 97.9 F | SYSTOLIC BLOOD PRESSURE: 148 MMHG | BODY MASS INDEX: 29.7 KG/M2 | WEIGHT: 189.2 LBS | HEIGHT: 67 IN | OXYGEN SATURATION: 91 %

## 2024-12-02 DIAGNOSIS — F51.01 PRIMARY INSOMNIA: ICD-10-CM

## 2024-12-02 DIAGNOSIS — E78.2 MIXED HYPERLIPIDEMIA: ICD-10-CM

## 2024-12-02 DIAGNOSIS — I10 BENIGN ESSENTIAL HYPERTENSION: ICD-10-CM

## 2024-12-02 DIAGNOSIS — E55.9 VITAMIN D DEFICIENCY: ICD-10-CM

## 2024-12-02 DIAGNOSIS — G31.84 MCI (MILD COGNITIVE IMPAIRMENT): Primary | ICD-10-CM

## 2024-12-02 DIAGNOSIS — N40.1 BENIGN PROSTATIC HYPERPLASIA WITH LOWER URINARY TRACT SYMPTOMS, SYMPTOM DETAILS UNSPECIFIED: ICD-10-CM

## 2024-12-02 DIAGNOSIS — E11.22 TYPE 2 DIABETES MELLITUS WITH CHRONIC KIDNEY DISEASE, WITHOUT LONG-TERM CURRENT USE OF INSULIN, UNSPECIFIED CKD STAGE (HCC): ICD-10-CM

## 2024-12-02 DIAGNOSIS — R32 URINARY INCONTINENCE, UNSPECIFIED TYPE: ICD-10-CM

## 2024-12-02 DIAGNOSIS — N18.30 STAGE 3 CHRONIC KIDNEY DISEASE, UNSPECIFIED WHETHER STAGE 3A OR 3B CKD (HCC): ICD-10-CM

## 2024-12-02 PROCEDURE — 99215 OFFICE O/P EST HI 40 MIN: CPT | Performed by: INTERNAL MEDICINE

## 2024-12-02 NOTE — PROGRESS NOTES
[facility]  Follow-up  [address] [facility] [address] [phone]    NAME: Gadiel Gallegos  AGE: 84 y.o. SEX: male    DATE OF ENCOUNTER: 12/2/2024    Assessment and Plan     1. MCI (mild cognitive impairment) (Primary)  -MoCA today 23/30 (was 20/30 on 6/30/2024) with deficits in visual-spatial, and delayed recall  -GDS 1/15  -Mild cognitive impairment.  Prognosis and management including pharmacologic as well as nonpharmacologic discussed at length with patient and wife  -Patient advised to remain active physically, mentally and socially  -OT/speech therapy referral for cognitive therapy  -Maintain chronic conditions under control and continue following with PCP  -Follow-up in 6 months and repeat MoCA    2. Benign essential hypertension  BP stable  Currently on valsartan hydrochlorothiazide 320-25 mg daily and amlodipine 2.5 mg daily  Continue following with PCP    3. Type 2 diabetes mellitus with chronic kidney disease, without long-term current use of insulin, unspecified CKD stage ([facility])  -A1c 6.2  -Diet control  -Continue following with PCP    4. Benign prostatic hyperplasia with lower urinary tract symptoms, symptom details unspecified  -Currently on tamsulosin 0.4 mg daily, finasteride 5 mg daily and oxybutynin 10 mg daily  -Follow-up with urologist    5. Stage 3 chronic kidney disease, unspecified whether stage 3a or 3b CKD ([facility])  -Maintain adequate hydration  -Avoid nephrotoxic agents  -Follow-up with PCP and monitor renal function    6. Urinary incontinence, unspecified type  -Currently on oxybutynin 10 mg daily    7. Vitamin D deficiency  -Continue vitamin D supplement    Chief Complaint     Chief Complaint   Patient presents with    Follow-up     6 month follow up      Patient is here today for follow up of memory issues and chronic conditions    History of Present Illness     Gadiel Gallegos who is a 84 y.o. male was seen in Geriatric follow-up today for memory difficulties and was  "accompanied by (Sammi Mcclellan). He was last seen on 6/3/2024 for regular follow-up visit for mild cognitive impairment, and scored 20/30 on MoCA.  Memory issues have not changed.  He states that his memory issues mainly short-term.  He does not have difficulties with names or words. He is independent with ADLs and IADLs.  Wife manages the couples finances.  He is still driving, no reports of accidents, does not get lost while driving and wife expressed no safety concerns.  No history of visual or auditory hallucinations.  No history of recent hospitalizations or falls.  He has not had medication changes.  He does not use assistive devices for ambulation.  No history of hearing loss.    The following portions of the patient's history were reviewed and updated as appropriate: allergies, current medications, past family history, past medical history, past social history, past surgical history and problem list.     Review of Systems     Constitutional: Negative for activity change.   HENT: Negative for hearing loss, trouble swallowing.    Eyes: Negative for visual disturbance.   Respiratory: Negative for choking and shortness of breath.    Gastrointestinal: Negative for nausea.   Genitourinary: Negative for dysuria and frequency.   Musculoskeletal: Negative for back pain and neck pain.   Neurological: Negative for dizziness, facial asymmetry, speech difficulty, weakness and light-headedness.   Psychiatric/Behavioral: Negative for behavioral problems and confusion.      Objective     /88 (BP Location: Left arm, Patient Position: Sitting, Cuff Size: Standard)   Pulse 87   Temp 97.9 °F (36.6 °C) (Temporal)   Ht 5' 7\" (1.702 m)   Wt 85.8 kg (189 lb 3.2 oz)   SpO2 91%   BMI 29.63 kg/m²     Physical Exam  Vitals and nursing note reviewed.   Constitutional:       Appearance: Normal appearance  HENT:      Head: Normocephalic and atraumatic.   Ears:     External ear normal.   Eyes:      Extraocular Movements: " Extraocular movements intact.      Conjunctiva/sclera: Conjunctivae normal.      Pupils: Pupils are equal, round, and reactive to light.   Mouth/Throat:     Oropharynx is clear and moist. No oropharyngeal exudate.   Neck:    Normal range of motion. Neck supple. No JVD present. No tracheal deviation present. No thyromegaly present.   Cardiovascular:      Rate and Rhythm: Normal rate.      Pulses: Normal pulses.      Heart sounds: Normal heart sounds. No murmur heard.  Pulmonary:      Effort: Pulmonary effort is normal. No respiratory distress.      Breath sounds: Normal breath sounds. Breath sounds: No wheezing or rales.  Abdominal:      General: Abdomen is flat.      Palpations: Abdomen is soft.   Musculoskeletal:      Cervical back: Normal range of motion and neck supple.   Skin:     General: Skin is warm and dry.   Neurological:      Mental Status: alert and oriented to person, place, and time.      Cranial Nerves: No cranial nerve deficit, dysarthria or facial asymmetry.      Sensory: Sensation is intact.      Motor: No weakness, or atrophy      Gait: Gait is intact.      MoCA 23/30    TUG 9s   Psychiatric:     Mood and Affect: Mood normal.     Behavior: Behavior normal.     GDS 1/15    Pertinent Laboratory/Diagnostic Studies:  I personally reviewed pertinent blood work    I have spent a total time of 45 minutes on 12/2/2024 in caring for this patient including Prognosis, Risks and benefits of tx options, Instructions for management, Patient and family education, Importance of tx compliance, Risk factor reductions, Impressions, Counseling / Coordination of care, Documenting in the medical record, Reviewing / ordering tests, medicine, and Obtaining / reviewing history.

## 2024-12-05 ENCOUNTER — TELEPHONE (OUTPATIENT)
Age: 84
End: 2024-12-05

## 2024-12-05 NOTE — TELEPHONE ENCOUNTER
[employer] called stating patient had an in-home assessment on 11/25/24 from health insurance company. They administered a PAD test which was abnormal. They have mailed the results to the office for follow up. The left was mild and the right was normal. If there are additional questions, please reach out - phone  [phone].

## 2025-01-20 ENCOUNTER — APPOINTMENT (OUTPATIENT)
Dept: OCCUPATIONAL THERAPY | Facility: REHABILITATION | Age: 85
End: 2025-01-20
Payer: COMMERCIAL

## 2025-01-20 ENCOUNTER — APPOINTMENT (OUTPATIENT)
Dept: SPEECH THERAPY | Facility: REHABILITATION | Age: 85
End: 2025-01-20
Payer: COMMERCIAL

## 2025-01-27 ENCOUNTER — EVALUATION (OUTPATIENT)
Dept: OCCUPATIONAL THERAPY | Facility: REHABILITATION | Age: 85
End: 2025-01-27
Payer: COMMERCIAL

## 2025-01-27 DIAGNOSIS — G31.84 MCI (MILD COGNITIVE IMPAIRMENT): Primary | ICD-10-CM

## 2025-01-27 PROCEDURE — 97166 OT EVAL MOD COMPLEX 45 MIN: CPT | Performed by: OCCUPATIONAL THERAPIST

## 2025-01-27 PROCEDURE — 96125 COGNITIVE TEST BY HC PRO: CPT | Performed by: OCCUPATIONAL THERAPIST

## 2025-01-27 NOTE — PROGRESS NOTES
OCCUPATIONAL THERAPY INITIAL EVALUATION:    01/27/2025  Gadiel Hendricksontz  1940  [id number]  KendalAngelika,*   Diagnosis ICD-10-CM Associated Orders   1. MCI (mild cognitive impairment)  G31.84 Ambulatory Referral to Occupational Therapy            Assessment    Assessment details: See skilled analysis for further details.         SKILLED ANALYSIS:  Pt is a 84 y.o. male referred to Occupational Therapy s/p MCI (mild cognitive impairment) [G31.84].  Pt participated in skilled OT evaluation and following formalized testing as well as clinical observation, Pt presents with the following areas of deficit:  delayed memory recall subjective complaints of forgetfulness of things people have told him, difficulties remembering date/day of the week, forgetfulness on important events on schedule, and occasional occurrences where he repeats what he previously told family members.  Pt was administered RBANs examination B this date. Pt scored a rating of average in the area of immediate memory recall, average in the area of visuospatial/constructional, low average for language function, average for attention to task, and rating extremely low in the area of delayed memory recall. Pt received a score of low average in the overall summation of index scores. Functionally, Pt reports difficulties with delayed memory, including forgetting tasks he was just about to do, forgetting things people have told him, and difficulties remembering date/day of the week since senior care. Pt will benefit from skilled Occupational Therapy services 1x/week for 4-6 weeks with focus on Pt edu on internal/external memory aides, HEP to improve memory functioning, and functional memory tasks to improve overall brain health/QOL.      GOALS:    Short Term:    Pt will be alert and oriented x 4 50% of the time to inc temporal orientation for appointment keeping and safe IADL practice 4 weeks    Pt will demo G carryover and understanding of temporal  "orientation compensatory strategies and orientation of daily schedules (ie. Use of calendars, alarms, etc) for inc safety with life roles and IADL fxn 4 weeks    Pt will demo G recall of 50% of Verbal/written information utilizing memory strategy of choice for improved STM/delayed memory and less occurrences of forgetfulness 4 weeks    Pt will demo G carryover of use of internal/external memory strategy aides for improved recall of daily events, schedule management, and improve recall of things people have told him with 50% accuracy 4 weeks  Pt will demo with G carryover of HEP focused on functional memory tasks/apps/activities to engage in to improve overall functional memory and brain health 4 weeks        Long - Term:   Pt will be alert and oriented x 4 65% of the time to inc temporal orientation for appointment keeping and safe IADL practice       Pt will demo G recall of 65% of Verbal/written information utilizing memory strategy of choice for improved STM/delayed memory and less occurrences of forgetfulness     Pt will demo G carryover of use of internal/external memory strategy aides for improved recall of daily events, schedule management, and improve recall of things people have told him with 65% accuracy          Subjective Evaluation    Quality of life: fair          SUBJECTIVE: \"My son is the first to recognize memory difficulties.\"    PATIENT GOAL: \"Improve overall memory abilities.\"      HISTORY OF PRESENT ILLNESS:     Pt is a 84 y.o. male who was referred to Occupational Therapy s/p  MCI (mild cognitive impairment) [G31.84]. As per chart review, Pt was referred to skilled OT services 2* ongoing memory deficits.  Pt with self-repor tof noticing memory deficits beginning approximately 6-9 months ago.  Pt reports wife noticing occasional forgetfulness.  Pt was last seen by Geriatrics on 12/02/2024 for regular follow-up visit for mild cognitive impairment.  Pt scored a 20/30 on MoCA when last assessed in " "June of 2024.  Pt with self-report of having two heart attacks in the past.      Pt with self-report of noticing the following memory deficits: remember date/day of the week, repeating self, occasions of missing payments with late fee required, forgetfulness of things that people have told him.     Pt lives in a bi-level home with wife.  Pt currently performing all ADLs/IADLs at independent status without difficulties reported.  Pt currently managing medications with use of pill box container at this time.  Pt's wife is managing finances as per baseline.  Pt continues the  role without difficulties reported.  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     PMH:   Past Medical History:   Diagnosis Date    Abnormal blood chemistry     Last Assessed: 7/19/2013    Abnormal glucose     Last Assessed: 11/6/2015    Arthritis     Bladder tumor     BPH (benign prostatic hypertrophy)     Cataracts, bilateral     \"start of\"    Coronary artery disease     2 stents. MI x2    Diabetes mellitus (HCC)     Diet controlled pre-diabetic    Erectile dysfunction of non-organic origin     Last Assessed: 5/9/2014    Fatigue     Resolved: 2/23/2015    Glaucoma     \"Start of\"    Hematuria, microscopic     Last Assessed: 11/7/2016    History of bladder stone     Hyperlipidemia     Hypertension     Knee pain, bilateral     If walking far. No assistive devices    Myocardial infarction (HCC) 2011, 2012    x2    Primary insomnia     Last Assessed; 7/20/2016    Recurrent right inguinal hernia     Last Assessed: 2/23/2015    Urinary incontinence     Urinary urgency     Last Assessed: 10/30/2015    Wears glasses        Past Surgical Hx:   Past Surgical History:   Procedure Laterality Date    BLADDER STONE REMOVAL      Laser procedure    CARDIAC CATHETERIZATION  2003    RCA stenting and 2004 stenting to circuflex    COLONOSCOPY      CORONARY ANGIOPLASTY WITH STENT PLACEMENT      Has 2 stents    CYSTOSCOPY  11/11/2015    w/ " Cystolitholapaxy    HERNIA REPAIR Bilateral 2015    Inguinal    JOINT REPLACEMENT Bilateral     TKR    JOINT REPLACEMENT Right     Right THR    MD CYSTO W/REMOVAL OF LESIONS SMALL N/A 2017    Procedure: CYSTOSCOPY WITH BIOPSIES WITH FULGURATION, INSTILLATION OF MYTOMYCIN, DIALATION OF MEATUS ;  Surgeon: Steven Fisher MD;  Location: [facility] OR;  Service: Urology    WISDOM TOOTH EXTRACTION            Pain Levels:      Restin    With Activity:  0        Objective     Functional Assessment        Comments  See impairment section for further details.       IMPAIRMENTS SECTION:      Assessments  The Repeatable Battery for the Assessment of Neuropsychological Status (RBANS) is a brief, individually-administered assessment which measures attention, language, visuospatial/constructional abilities, and immediate & delayed memory. The RBANS is intended for use with adolescents to adults, ages 12 to 89 years. The following results were obtained during the administration of the assessment.    Form: B    Cognitive Domain/Subtest: Index Score: Percentile Rank: Classification: IE: Status:   IMMEDIATE MEMORY 94 34%ile Average           1. List Learning ()          2. Story Memory ()           VISUOSPATIAL/  CONSTRUCTIONAL 100 50%ile Average           3. Figure Copy ()          4. Line Orientation ()           LANGUAGE 89 23%ile Low Average           5. Picture Naming (9/10)          6. Semantic Fluency ()           ATTENTION 103 58%ile Average           7. Digit Span ()          8. Coding ()           DELAYED MEMORY 48 <0.1%ile Extremely Low          9. List Recall (010)          10. List Recognition ()          11. Story Recall (0)          12. Figure Recall ()           Sum of Index Scores:  434      Total Score:  82      Percentile: 12%ile      Classification: Low Average          TIME:   4222-9577 OT Eval  RBANS >91 minutes for administration, scoring,   interpretation, documentation, and POC development.      PLANNED THERAPY INTERVENTIONS:  Internal and external memory aides  Memory and mental manipulation  Auditory processing with immediate recall  Memory retention with immediate and delayed recall  Pt edu on cognitive/memory apps to improve brain health  Functional memory tasks   Practice utilizing internal/external memory aides     INTERVENTION COMMENTS:  Diagnosis: MCI (mild cognitive impairment) [G31.84]  Precautions: CAD, DM, Glaucoma, HTN  Insurance: Payor: VIMAL / Plan: [employer] HMO / Product Type: HMO Commercial /   1 of ____ visits through _____, PN due 02/27/2025

## 2025-01-29 ENCOUNTER — EVALUATION (OUTPATIENT)
Dept: SPEECH THERAPY | Facility: REHABILITATION | Age: 85
End: 2025-01-29
Payer: COMMERCIAL

## 2025-01-29 DIAGNOSIS — R48.8 OTHER SYMBOLIC DYSFUNCTIONS: Primary | ICD-10-CM

## 2025-01-29 DIAGNOSIS — R41.841 COGNITIVE COMMUNICATION DEFICIT: ICD-10-CM

## 2025-01-29 DIAGNOSIS — G31.84 MCI (MILD COGNITIVE IMPAIRMENT): ICD-10-CM

## 2025-01-29 PROCEDURE — 92523 SPEECH SOUND LANG COMPREHEN: CPT

## 2025-01-29 PROCEDURE — 92507 TX SP LANG VOICE COMM INDIV: CPT

## 2025-02-03 ENCOUNTER — APPOINTMENT (OUTPATIENT)
Dept: OCCUPATIONAL THERAPY | Facility: REHABILITATION | Age: 85
End: 2025-02-03
Payer: COMMERCIAL

## 2025-02-05 ENCOUNTER — OFFICE VISIT (OUTPATIENT)
Dept: SPEECH THERAPY | Facility: REHABILITATION | Age: 85
End: 2025-02-05
Payer: COMMERCIAL

## 2025-02-05 DIAGNOSIS — R48.8 OTHER SYMBOLIC DYSFUNCTIONS: Primary | ICD-10-CM

## 2025-02-05 DIAGNOSIS — R41.841 COGNITIVE COMMUNICATION DEFICIT: ICD-10-CM

## 2025-02-05 DIAGNOSIS — G31.84 MCI (MILD COGNITIVE IMPAIRMENT): ICD-10-CM

## 2025-02-05 PROCEDURE — 92507 TX SP LANG VOICE COMM INDIV: CPT

## 2025-02-05 NOTE — PROGRESS NOTES
Daily Speech Treatment Note    Today's date: 2025  Patient’s name: Gadiel Gallegos  : 1940  MRN: [id number]  Safety measures: medication allergies  Referring provider: Angelika Edmonds,*    Encounter Diagnosis     ICD-10-CM    1. Other symbolic dysfunctions  R48.8       2. Cognitive communication deficit  R41.841       3. MCI (mild cognitive impairment)  G31.84               Visit tracking:  -Referring provider: Epic  -Billing guidelines: CMS  -Visit #2  -Insurance: [employer] (no auth required, BOMN)  -RE due 3/26/2025    Subjective/Behavioral:  -Patient reports he is doing well, has implemented strategies previously recommended for improved word finding, feels it has been somewhat helpful    Objective/Assessment:  -Reviewed testing results and goals in plan care with patient. Patient is in agreement at this time.    Short-term goals:    Patient will name an average of 15+ items in a category in 60 seconds over 5 trials using compensatory strategies and min cues to facilitate improved word retrieval skills, to be achieved in 6-8 weeks.    Patient will name an average of 10+ words that begin with a specific letter in 60 seconds over 5 trials using compensatory strategies and min cues to facilitate improved word retrieval skills, to be achieved in 6-8 weeks.  Pt completed abstract level generative naming tasks ([employer]apple-- provided target letters within category)  with 70% accuracy independently, able to improve to 100% accuracy provided min to mod verbal descriptive cues in order to facilitate improved word finding during functional conversational tasks.    Patient will name up to 3 synonyms for a given word with 80% accuracy to build expressive vocabulary for conversation, to be achieved in 6-8 weeks.    Patient will complete concrete and abstract categorization tasks to 80% accuracy to facilitate improved generative naming skills and working memory, to be achieved in 6-8 weeks.    Patient will  We will stop the lisinopril and change the patient to the losartan as ordered due to her Ace induced cough  We will see her back as scheduled  demonstrate use of compensatory strategies to improve word finding with 80% accuracy provided cues as needed in order to reduce conversational word finding difficulties, communication break down and facilitate reduced episodes of delayed response time, to be achieved in 6-8 weeks.   Patient completed semantic feature analysis tasks with 90% accuracy, improving to 100% accuracy provided min level cues in order to facilitate improved word retrieval to allow for improved word finding during conversational tasks.       Plan:  -Patient was provided with home exercises/activities to target goals in plan of care at the end of today's session.  -Continue with current plan of care.

## 2025-02-10 ENCOUNTER — APPOINTMENT (OUTPATIENT)
Dept: OCCUPATIONAL THERAPY | Facility: REHABILITATION | Age: 85
End: 2025-02-10
Payer: COMMERCIAL

## 2025-02-13 ENCOUNTER — OFFICE VISIT (OUTPATIENT)
Dept: SPEECH THERAPY | Facility: REHABILITATION | Age: 85
End: 2025-02-13
Payer: COMMERCIAL

## 2025-02-13 DIAGNOSIS — R41.841 COGNITIVE COMMUNICATION DEFICIT: ICD-10-CM

## 2025-02-13 DIAGNOSIS — R48.8 OTHER SYMBOLIC DYSFUNCTIONS: Primary | ICD-10-CM

## 2025-02-13 DIAGNOSIS — G31.84 MCI (MILD COGNITIVE IMPAIRMENT): ICD-10-CM

## 2025-02-13 PROCEDURE — 92507 TX SP LANG VOICE COMM INDIV: CPT

## 2025-02-13 NOTE — PROGRESS NOTES
Daily Speech Treatment Note    Today's date: 2025  Patient’s name: Gadiel Gallegos  : 1940  MRN: [id number]  Safety measures: medication allergies  Referring provider: Angelika Edmonds,*    Encounter Diagnosis     ICD-10-CM    1. Other symbolic dysfunctions  R48.8       2. Cognitive communication deficit  R41.841       3. MCI (mild cognitive impairment)  G31.84           Visit tracking:  -Referring provider: Epic  -Billing guidelines: CMS  -Visit #3  -Insurance: [employer] HMO (no auth required, BOMN)  -RE due 3/26/2025    Subjective/Behavioral:  -Patient reports he is doing well, wasn't able to implement homework since last session    Objective/Assessment:  -Patient education on plan of therapy, patient verbalized understanding.     Short-term goals:    Patient will name an average of 15+ items in a category in 60 seconds over 5 trials using compensatory strategies and min cues to facilitate improved word retrieval skills, to be achieved in 6-8 weeks.    Patient will name an average of 10+ words that begin with a specific letter in 60 seconds over 5 trials using compensatory strategies and min cues to facilitate improved word retrieval skills, to be achieved in 6-8 weeks.  Pt completed abstract level generative naming tasks (Quicktionary-- provided target letters within category)  with 60% accuracy independently, able to improve to 90% accuracy provided mod verbal descriptive cues in order to facilitate improved word finding during functional conversational tasks.    Patient will name up to 3 synonyms for a given word with 80% accuracy to build expressive vocabulary for conversation, to be achieved in 6-8 weeks.  Pt completed synonym/antonym task, asked to name 1 synonym and 1 antonym for target word in order to faciliate improved generative naming and improved word finding provided min to mod level cues for improved accuracy from 70% independently to 90% accuracy (provided cues).    Patient will  complete concrete and abstract categorization tasks to 80% accuracy to facilitate improved generative naming skills and working memory, to be achieved in 6-8 weeks.    Patient will demonstrate use of compensatory strategies to improve word finding with 80% accuracy provided cues as needed in order to reduce conversational word finding difficulties, communication break down and facilitate reduced episodes of delayed response time, to be achieved in 6-8 weeks.         Plan:  -Patient was provided with home exercises/activities to target goals in plan of care at the end of today's session.  -Continue with current plan of care.

## 2025-02-17 ENCOUNTER — OFFICE VISIT (OUTPATIENT)
Dept: OCCUPATIONAL THERAPY | Facility: REHABILITATION | Age: 85
End: 2025-02-17
Payer: COMMERCIAL

## 2025-02-17 DIAGNOSIS — G31.84 MCI (MILD COGNITIVE IMPAIRMENT): Primary | ICD-10-CM

## 2025-02-17 PROCEDURE — 97530 THERAPEUTIC ACTIVITIES: CPT | Performed by: OCCUPATIONAL THERAPIST

## 2025-02-17 NOTE — PROGRESS NOTES
"Daily Note     Today's date: 2025  Patient name: Gadiel Gallegos  : 1940  MRN: [id number]  Referring provider: Angelika Edmonds,*  Dx:   Encounter Diagnosis     ICD-10-CM    1. MCI (mild cognitive impairment)  G31.84                      Subjective: \"My wife helps me remember if I forget.\"      Objective: See treatment description below    Thera Act: OTR educated Pt on external memory strategies to improve immediate/delayed memory recall, memory retention in home and community environments, and ease overall memory difficulties.  OTR educated Pt on the following strategies:     - Adapt your environment  - A place for everything, and everything in place: placing items strategically  - Getting into a routine  - Improve wellbeing  - Using calendar  - Using post it notes  - Using white board for weekly to do lists  - Making lists  - Pill-box for improved medication management  - Use of kitchen timer    Assessment: Tolerated treatment well. Patient would benefit from continued OT    Pt demo with excellent understanding of the above external memory aides to ease memory difficulties and help overall functional performance daily.  Pt with self-report of already using lists, pill boxes, and calendars to improve memory recall, medication management, and awareness of date/day of the week.  Pt plans to begin to implement more frequently.       Plan: Continue per plan of care.      INTERVENTION COMMENTS:  Diagnosis: MCI (mild cognitive impairment) [G31.84]  Precautions: CAD, DM, Glaucoma, HTN  Insurance: Payor: VIMAL / Plan: [employer] HMO / Product Type: HMO Commercial /   2 of ____ visits through _____, PN due 2025      "

## 2025-02-20 ENCOUNTER — OFFICE VISIT (OUTPATIENT)
Dept: SPEECH THERAPY | Facility: REHABILITATION | Age: 85
End: 2025-02-20
Payer: COMMERCIAL

## 2025-02-20 DIAGNOSIS — R48.8 OTHER SYMBOLIC DYSFUNCTIONS: ICD-10-CM

## 2025-02-20 DIAGNOSIS — R41.841 COGNITIVE COMMUNICATION DEFICIT: ICD-10-CM

## 2025-02-20 DIAGNOSIS — G31.84 MCI (MILD COGNITIVE IMPAIRMENT): Primary | ICD-10-CM

## 2025-02-20 PROCEDURE — 92507 TX SP LANG VOICE COMM INDIV: CPT | Performed by: SPEECH-LANGUAGE PATHOLOGIST

## 2025-02-20 NOTE — PROGRESS NOTES
Daily Speech Treatment Note    Today's date: 2025  Patient’s name: Gadiel Gallegos  : 1940  MRN: [id number]  Safety measures: medication allergies  Referring provider: Angelika Edmonds,*    Encounter Diagnosis     ICD-10-CM    1. MCI (mild cognitive impairment)  G31.84       2. Cognitive communication deficit  R41.841       3. Other symbolic dysfunctions  R48.8           Visit tracking:  -Referring provider: Epic  -Billing guidelines: CMS  -Visit #4  -Insurance: [employer] HMO (no auth required, BOMN)  -RE due 3/26/2025    Subjective/Behavioral:  -Patient reports he is doing well, was planning to bring wife but she was not feeling well today. He is planning to bring her to a future session so she can give input on his memory needs at home.     Objective/Assessment:  -Patient education on plan of therapy, patient verbalized understanding.     Short-term goals:    Patient will name an average of 15+ items in a category in 60 seconds over 5 trials using compensatory strategies and min cues to facilitate improved word retrieval skills, to be achieved in 6-8 weeks.  Fruits: 10 in one minute  Vegetables: 9 in one minute  Animals: 9 in one minute      Patient will name an average of 10+ words that begin with a specific letter in 60 seconds over 5 trials using compensatory strategies and min cues to facilitate improved word retrieval skills, to be achieved in 6-8 weeks.  Pt completed abstract level generative naming tasks (Tappel-- provided target letters within category)  with 60% accuracy independently with approximately 1-2 minutes per word generation, improved to 90% accuracy provided mod verbal descriptive cues in order to facilitate improved word finding during functional conversational tasks.    Conversely, patient was provided with items in category and instructed to name category. He completed this with 70% accuracy with moderate cueing.    Conversation:  At end of session, he shared about the death of  his father who was [place] when patient was 23. He demonstrated some tangential speech and repeated portions of this story twice.     Patient will name up to 3 synonyms for a given word with 80% accuracy to build expressive vocabulary for conversation, to be achieved in 6-8 weeks.    Patient will complete concrete and abstract categorization tasks to 80% accuracy to facilitate improved generative naming skills and working memory, to be achieved in 6-8 weeks.    Patient will demonstrate use of compensatory strategies to improve word finding with 80% accuracy provided cues as needed in order to reduce conversational word finding difficulties, communication break down and facilitate reduced episodes of delayed response time, to be achieved in 6-8 weeks.       Plan:  -Patient was provided with home exercises/activities to target goals in plan of care at the end of today's session.  -Continue with current plan of care.

## 2025-02-24 ENCOUNTER — APPOINTMENT (OUTPATIENT)
Dept: OCCUPATIONAL THERAPY | Facility: REHABILITATION | Age: 85
End: 2025-02-24
Payer: COMMERCIAL

## 2025-02-24 DIAGNOSIS — N40.1 BENIGN PROSTATIC HYPERPLASIA WITH LOWER URINARY TRACT SYMPTOMS, SYMPTOM DETAILS UNSPECIFIED: ICD-10-CM

## 2025-02-25 ENCOUNTER — OFFICE VISIT (OUTPATIENT)
Dept: OCCUPATIONAL THERAPY | Facility: REHABILITATION | Age: 85
End: 2025-02-25
Payer: COMMERCIAL

## 2025-02-25 DIAGNOSIS — G31.84 MCI (MILD COGNITIVE IMPAIRMENT): Primary | ICD-10-CM

## 2025-02-25 PROCEDURE — 97530 THERAPEUTIC ACTIVITIES: CPT | Performed by: OCCUPATIONAL THERAPIST

## 2025-02-25 RX ORDER — TAMSULOSIN HYDROCHLORIDE 0.4 MG/1
0.4 CAPSULE ORAL 2 TIMES DAILY
Qty: 180 CAPSULE | Refills: 1 | OUTPATIENT
Start: 2025-02-25

## 2025-02-25 NOTE — PROGRESS NOTES
"Daily Note     Today's date: 2025  Patient name: Gadiel Gallegos  : 1940  MRN: [id number]  Referring provider: Angelika Edmonds,*  Dx:   Encounter Diagnosis     ICD-10-CM    1. MCI (mild cognitive impairment)  G31.84                      Subjective: \"I think I have been doing better since starting with you.\"      Objective: See treatment description below    Thera Act: OTR educated Pt on internal memory strategies to improve immediate/delayed memory recall, memory retention in home and community environments, and ease overall memory difficulties.  OTR educated Pt on the following strategies:     - Attention; pay attention and focus on what is being said; reduce background distractions  - Small chunks: organize information under key headings, in small amounts.  Try to break down information into small, bite-sized chunks, rather than long rambling streams of information.   - Repeating and rehearsing  - Aim for deep-level processing: the more the new information can be attached to existing memories or mental structures, the deeper it will be processed  - Making links or associations  - Make visual pictures  - 5Ws: what, who, when, where, why  - Stories or rhymes: Make up a story or rhyme that contains the information you want to remember  - Errorless learning: you learn more efficiently if you get it right the first time.   - Expanded rehearsal: Repeat the information in your mind immediately after you have taken it in, then again after a slight delay.  Then increase the delay by a few hours, and later by a few days.   - Retrace by using retrieval cues  - First letter cueing    Pt completed functional memory task of placing 30 items in 6 different categories, followed by recalling after studying items x 2 minutes focusing on improved functional application of internal memory aides of categorization/chunking/grouping, improved immediate memory recall, and improved memory retention.       Assessment: " Tolerated treatment well. Patient would benefit from continued OT    Pt demo with excellent understanding of the above internal memory aides to ease memory difficulties and help overall functional performance daily.  Pt with plans to begin incorporating internal memory aides more frequently throughout daily routines to ease memory difficulties and improved functional performance/recall.  Pt demo with excellent carryover and functional application of grouping/chunking strategies when completing functional memory task with abilities to recall >75% of items with one minute study time.        Plan: Continue per plan of care.      INTERVENTION COMMENTS:  Diagnosis: MCI (mild cognitive impairment) [G31.84]  Precautions: CAD, DM, Glaucoma, HTN  Insurance: Payor: VIMAL / Plan: VIMAL HMO / Product Type: HMO Commercial /   3 of ____ visits through _____, PN due 02/27/2025

## 2025-02-27 ENCOUNTER — OFFICE VISIT (OUTPATIENT)
Dept: SPEECH THERAPY | Facility: REHABILITATION | Age: 85
End: 2025-02-27
Payer: COMMERCIAL

## 2025-02-27 DIAGNOSIS — R41.841 COGNITIVE COMMUNICATION DEFICIT: ICD-10-CM

## 2025-02-27 DIAGNOSIS — R48.8 OTHER SYMBOLIC DYSFUNCTIONS: ICD-10-CM

## 2025-02-27 DIAGNOSIS — G31.84 MCI (MILD COGNITIVE IMPAIRMENT): Primary | ICD-10-CM

## 2025-02-27 PROCEDURE — 92507 TX SP LANG VOICE COMM INDIV: CPT | Performed by: SPEECH-LANGUAGE PATHOLOGIST

## 2025-02-27 NOTE — PROGRESS NOTES
Daily Speech Treatment Note    Today's date: 2025  Patient’s name: Gadiel Gallegos  : 1940  MRN: [id number]  Safety measures: medication allergies  Referring provider: Angelika Edmonds,*    Encounter Diagnosis     ICD-10-CM    1. MCI (mild cognitive impairment)  G31.84       2. Cognitive communication deficit  R41.841       3. Other symbolic dysfunctions  R48.8           Visit tracking:  -Referring provider: Epic  -Billing guidelines: CMS  -Visit #5  -Insurance: [employer]na HMO (no auth required, BOMN)  -RE due 3/26/2025    Subjective/Behavioral:  -Patient reports he is doing well. Of note, patient was received from the waiting room having trouble with his coffee. Clinician helped and observed he had put a pack of sugar in the slot for a Keurig pod.     Objective/Assessment:  -Patient education on plan of therapy, patient verbalized understanding.     Short-term goals:  Patient will name an average of 15+ items in a category in 60 seconds over 5 trials using compensatory strategies and min cues to facilitate improved word retrieval skills, to be achieved in 6-8 weeks.    Patient will name an average of 10+ words that begin with a specific letter in 60 seconds over 5 trials using compensatory strategies and min cues to facilitate improved word retrieval skills, to be achieved in 6-8 weeks.    Patient will name up to 3 synonyms for a given word with 80% accuracy to build expressive vocabulary for conversation, to be achieved in 6-8 weeks.    Patient will complete concrete and abstract categorization tasks to 80% accuracy to facilitate improved generative naming skills and working memory, to be achieved in 6-8 weeks.  Patient was provided with a category and instructed to name and write 3 items in the category. He completed this with 90% accuracy with minimal cueing.    Patient will demonstrate use of compensatory strategies to improve word finding with 80% accuracy provided cues as needed in order to  reduce conversational word finding difficulties, communication break down and facilitate reduced episodes of delayed response time, to be achieved in 6-8 weeks.   Conversation:  At end of session, he told the same story twice in therapy about not being able to tolerate coffee as a young adult. He demonstrated some tangential speech and repeated portions of this story twice.     Plan:  -Patient was provided with home exercises/activities to target goals in plan of care at the end of today's session.  -Continue with current plan of care.

## 2025-03-03 ENCOUNTER — OFFICE VISIT (OUTPATIENT)
Dept: OCCUPATIONAL THERAPY | Facility: REHABILITATION | Age: 85
End: 2025-03-03
Payer: COMMERCIAL

## 2025-03-03 DIAGNOSIS — G31.84 MCI (MILD COGNITIVE IMPAIRMENT): Primary | ICD-10-CM

## 2025-03-03 PROCEDURE — 97530 THERAPEUTIC ACTIVITIES: CPT | Performed by: OCCUPATIONAL THERAPIST

## 2025-03-03 NOTE — PROGRESS NOTES
"Daily Note     Today's date: 3/3/2025  Patient name: Gadiel Gallegos  : 1940  MRN: [id number]  Referring provider: Angelika Edmonds,*  Dx:   Encounter Diagnosis     ICD-10-CM    1. MCI (mild cognitive impairment)  G31.84                      Subjective: \"I think my memory is about the same.\"      Objective: See treatment description below    Thera Act: Pt complete memory and mental manipulation activity with demands of recalling 4-word auditory sequences, placing the words in the correct category, identifying the words on word cards placed in scattered manner on table top, and then placing the four word sequences in correct reverse order to improve immediate memory recall, memory retention, memory encoding, improved functional application of internal memory aides, and improved mental processing speed.    Assessment: Tolerated treatment well. Patient would benefit from continued OT    Pt demo with G functional application of chunking/grouping/categorizing throughout full duration of functional memory task with abilities to recall 70% of the 4-word sequences without repetition required.  Pt also demo with abilities to retain the 4-word auditory sequences to retrieve 70% of words from scattered pile on desk with G response to use of categories strategy. Pt demo with abilities to improve mental processing speed with abilities to place 70% of the 4-word sequences in correct reverse order.  Pt demo with self-report of beginning to apply internal/external memory aides in home environment to ease overall memory difficulties.      Plan: Continue per plan of care.      INTERVENTION COMMENTS:  Diagnosis: MCI (mild cognitive impairment) [G31.84]  Precautions: CAD, DM, Glaucoma, HTN  Insurance: Payor: VIMAL / Plan: [employer]ATLIN[employer] HMO / Product Type: HMO Commercial /   4 of ____ visits through _____, PN due 2025      "

## 2025-03-05 ENCOUNTER — APPOINTMENT (OUTPATIENT)
Dept: OCCUPATIONAL THERAPY | Facility: REHABILITATION | Age: 85
End: 2025-03-05
Payer: COMMERCIAL

## 2025-03-05 DIAGNOSIS — N40.1 BENIGN PROSTATIC HYPERPLASIA WITH LOWER URINARY TRACT SYMPTOMS, SYMPTOM DETAILS UNSPECIFIED: ICD-10-CM

## 2025-03-05 NOTE — TELEPHONE ENCOUNTER
Received fax thru [employer] for med refill    Sent to access they denied     Written 10/23/24 90 days w/1 refill  Last refill 1/8/25

## 2025-03-06 RX ORDER — TAMSULOSIN HYDROCHLORIDE 0.4 MG/1
0.4 CAPSULE ORAL 2 TIMES DAILY
Qty: 180 CAPSULE | Refills: 1 | Status: SHIPPED | OUTPATIENT
Start: 2025-03-06

## 2025-03-12 ENCOUNTER — APPOINTMENT (OUTPATIENT)
Dept: OCCUPATIONAL THERAPY | Facility: REHABILITATION | Age: 85
End: 2025-03-12
Payer: COMMERCIAL

## 2025-03-13 ENCOUNTER — EVALUATION (OUTPATIENT)
Dept: OCCUPATIONAL THERAPY | Facility: REHABILITATION | Age: 85
End: 2025-03-13
Payer: COMMERCIAL

## 2025-03-13 ENCOUNTER — OFFICE VISIT (OUTPATIENT)
Dept: SPEECH THERAPY | Facility: REHABILITATION | Age: 85
End: 2025-03-13
Payer: COMMERCIAL

## 2025-03-13 DIAGNOSIS — R41.841 COGNITIVE COMMUNICATION DEFICIT: ICD-10-CM

## 2025-03-13 DIAGNOSIS — R48.8 OTHER SYMBOLIC DYSFUNCTIONS: ICD-10-CM

## 2025-03-13 DIAGNOSIS — G31.84 MCI (MILD COGNITIVE IMPAIRMENT): Primary | ICD-10-CM

## 2025-03-13 PROCEDURE — 97530 THERAPEUTIC ACTIVITIES: CPT | Performed by: OCCUPATIONAL THERAPIST

## 2025-03-13 PROCEDURE — 92507 TX SP LANG VOICE COMM INDIV: CPT | Performed by: SPEECH-LANGUAGE PATHOLOGIST

## 2025-03-13 NOTE — PROGRESS NOTES
Daily Speech Treatment Note    Today's date: 3/13/2025  Patient’s name: Gadiel Gallegos  : 1940  MRN: [id number]  Safety measures: medication allergies  Referring provider: Angelika Edmonds,*    Encounter Diagnosis     ICD-10-CM    1. MCI (mild cognitive impairment)  G31.84       2. Cognitive communication deficit  R41.841       3. Other symbolic dysfunctions  R48.8           Visit tracking:  -Referring provider: Epic  -Billing guidelines: CMS  -Visit #6  -Insurance: Aetna HMO (no auth required, BOMN)  -RE due 3/26/2025    Subjective/Behavioral:  -Patient reports he is doing well. He missed his last appt bc he got the days mixed up.    Objective/Assessment:  -Patient education on plan of therapy, patient verbalized understanding.     Short-term goals:  Patient will name an average of 15+ items in a category in 60 seconds over 5 trials using compensatory strategies and min cues to facilitate improved word retrieval skills, to be achieved in 6-8 weeks.  11/food  18/cities  13/things on a farm  10/occupation    Patient will name an average of 10+ words that begin with a specific letter in 60 seconds over 5 trials using compensatory strategies and min cues to facilitate improved word retrieval skills, to be achieved in 6-8 weeks.  S: 6  M: 5    Patient will name up to 3 synonyms for a given word with 80% accuracy to build expressive vocabulary for conversation, to be achieved in 6-8 weeks.  Nice: Thoughtful, Mature, Kind  Errors: Pretty, Beautiful     Patient will complete concrete and abstract categorization tasks to 80% accuracy to facilitate improved generative naming skills and working memory, to be achieved in 6-8 weeks.  Patient was provided with a category and initial letter constraint (using Speedy word games) and asked to name appropriate word in the category. He completed this with 80% accuracy with minimal cueing.    Also provided     Patient will demonstrate use of compensatory strategies to  improve word finding with 80% accuracy provided cues as needed in order to reduce conversational word finding difficulties, communication break down and facilitate reduced episodes of delayed response time, to be achieved in 6-8 weeks.     Conversation:  At end of session, he told the same story twice in therapy about not being able to tolerate coffee as a young adult. He demonstrated some tangential speech and repeated portions of this story twice.     Plan:  -Patient was provided with home exercises/activities to target goals in plan of care at the end of today's session.  -Continue with current plan of care.

## 2025-03-13 NOTE — PROGRESS NOTES
OCCUPATIONAL THERAPY PROGRESS NOTE #1:     03/13/2025  Gadiel SHEBA Gallegos  1940  [id number]  KendalAngelika,*   Diagnosis ICD-10-CM Associated Orders   1. MCI (mild cognitive impairment)  G31.84             Assessment    Assessment details: See skilled analysis for further details.         SKILLED ANALYSIS:  Pt is a 84 y.o. male referred to Occupational Therapy s/p MCI (mild cognitive impairment) [G31.84].  Pt participated in skilled OT evaluation and following formalized testing as well as clinical observation, Pt presents with the following areas of deficit:  delayed memory recall subjective complaints of forgetfulness of things people have told him, difficulties remembering date/day of the week, forgetfulness on important events on schedule, and occasional occurrences where he repeats what he previously told family members.  Pt was administered RBANs examination B this date. Pt scored a rating of average in the area of immediate memory recall, average in the area of visuospatial/constructional, low average for language function, average for attention to task, and rating extremely low in the area of delayed memory recall. Pt received a score of low average in the overall summation of index scores. Functionally, Pt reports difficulties with delayed memory, including forgetting tasks he was just about to do, forgetting things people have told him, and difficulties remembering date/day of the week since detention. Pt will benefit from skilled Occupational Therapy services 1x/week for 4-6 weeks with focus on Pt edu on internal/external memory aides, HEP to improve memory functioning, and functional memory tasks to improve overall brain health/QOL.    Upon this date (03/3/2025), Pt participated in re-evaluation to further assess fxnl progression towards Occupational Therapy goals. Pt demo with G functional progression towards goals in POC evident by improved functional application of internal/external memory  aides, improved orientation to date/year, improved immediate memory recall, and less tendencies of repeating self.  Following formalized testing and clinical observation, Pt continues to present with the following limitation: impaired delayed memory recall, occasional forgetfulness of events and tasks to be completed, decreased executive functioning, and ongoing occasional forgetfulness of things people have told him.  OTR recommending Pt to continue skilled OT services 1x/week for 4 more weeks to improve on overall delayed memory recall, cognitive abilities, and enhance overall brain health.          GOALS:    Short Term:    Pt will be alert and oriented x 4 50% of the time to inc temporal orientation for appointment keeping and safe IADL practice 4 weeks-- MET    Pt will demo G carryover and understanding of temporal orientation compensatory strategies and orientation of daily schedules (ie. Use of calendars, alarms, etc) for inc safety with life roles and IADL fxn 4 weeks- PARTIALLY MET    Pt will demo G recall of 50% of Verbal/written information utilizing memory strategy of choice for improved STM/delayed memory and less occurrences of forgetfulness 4 weeks- NOT MET    Pt will demo G carryover of use of internal/external memory strategy aides for improved recall of daily events, schedule management, and improve recall of things people have told him with 50% accuracy 4 weeks- NOT MET  Pt will demo with G carryover of HEP focused on functional memory tasks/apps/activities to engage in to improve overall functional memory and brain health 4 weeks-- MET        Long - Term:   Pt will be alert and oriented x 4 65% of the time to inc temporal orientation for appointment keeping and safe IADL practice -- NOT MET      Pt will demo G recall of 65% of Verbal/written information utilizing memory strategy of choice for improved STM/delayed memory and less occurrences of forgetfulness -- NOT MET    Pt will demo G carryover  "of use of internal/external memory strategy aides for improved recall of daily events, schedule management, and improve recall of things people have told him with 65% accuracy- NOT MET          Subjective Evaluation    Quality of life: fair          SUBJECTIVE: \"I think I am doing the same or better.\"    PATIENT GOAL: \"Improve overall memory abilities.\"      HISTORY OF PRESENT ILLNESS:     Pt is a 84 y.o. male who was referred to Occupational Therapy s/p  MCI (mild cognitive impairment) [G31.84]. As per chart review, Pt was referred to skilled OT services 2* ongoing memory deficits.  Pt with self-repor tof noticing memory deficits beginning approximately 6-9 months ago.  Pt reports wife noticing occasional forgetfulness.  Pt was last seen by Geriatrics on 12/02/2024 for regular follow-up visit for mild cognitive impairment.  Pt scored a 20/30 on MoCA when last assessed in June of 2024.  Pt with self-report of having two heart attacks in the past.      Pt with self-report of noticing the following memory deficits: remember date/day of the week, repeating self, occasions of missing payments with late fee required, forgetfulness of things that people have told him.     Pt lives in a bi-level home with wife.  Pt currently performing all ADLs/IADLs at independent status without difficulties reported.  Pt currently managing medications with use of pill box container at this time.  Pt's wife is managing finances as per baseline.  Pt continues the  role without difficulties reported.  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     PMH:   Past Medical History:   Diagnosis Date    Abnormal blood chemistry     Last Assessed: 7/19/2013    Abnormal glucose     Last Assessed: 11/6/2015    Arthritis     Bladder tumor     BPH (benign prostatic hypertrophy)     Cataracts, bilateral     \"start of\"    Coronary artery disease     2 stents. MI x2    Diabetes mellitus (HCC)     Diet controlled pre-diabetic    " "Erectile dysfunction of non-organic origin     Last Assessed: 2014    Fatigue     Resolved: 2015    Glaucoma     \"Start of\"    Hematuria, microscopic     Last Assessed: 2016    History of bladder stone     Hyperlipidemia     Hypertension     Knee pain, bilateral     If walking far. No assistive devices    Myocardial infarction (HCC) , 2012    x2    Primary insomnia     Last Assessed; 2016    Recurrent right inguinal hernia     Last Assessed: 2015    Urinary incontinence     Urinary urgency     Last Assessed: 10/30/2015    Wears glasses        Past Surgical Hx:   Past Surgical History:   Procedure Laterality Date    BLADDER STONE REMOVAL      Laser procedure    CARDIAC CATHETERIZATION      RCA stenting and  stenting to circuflex    COLONOSCOPY      CORONARY ANGIOPLASTY WITH STENT PLACEMENT      Has 2 stents    CYSTOSCOPY  2015    w/ Cystolitholapaxy    HERNIA REPAIR Bilateral 2015    Inguinal    JOINT REPLACEMENT Bilateral     TKR    JOINT REPLACEMENT Right     Right THR    MA CYSTO W/REMOVAL OF LESIONS SMALL N/A 2017    Procedure: CYSTOSCOPY WITH BIOPSIES WITH FULGURATION, INSTILLATION OF MYTOMYCIN, DIALATION OF MEATUS ;  Surgeon: Steven Fisher MD;  Location: [facility] OR;  Service: Urology    WISDOM TOOTH EXTRACTION            Pain Levels:      Restin    With Activity:  0        Objective     Functional Assessment        Comments  See impairment section for further details.       IMPAIRMENTS SECTION:      Assessments  The Repeatable Battery for the Assessment of Neuropsychological Status (RBANS) is a brief, individually-administered assessment which measures attention, language, visuospatial/constructional abilities, and immediate & delayed memory. The RBANS is intended for use with adolescents to adults, ages 12 to 89 years. The following results were obtained during the administration of the assessment.    Form: B    Cognitive Domain/Subtest: Index " Score: Percentile Rank: Classification: IE: Status:   IMMEDIATE MEMORY 94 34%ile Average           1. List Learning ()          2. Story Memory ()           VISUOSPATIAL/  CONSTRUCTIONAL 100 50%ile Average           3. Figure Copy ()          4. Line Orientation ()           LANGUAGE 89 23%ile Low Average           5. Picture Naming (9/10)          6. Semantic Fluency ()           ATTENTION 103 58%ile Average           7. Digit Span ()          8. Coding ()           DELAYED MEMORY 48 <0.1%ile Extremely Low          9. List Recall (0/10)          10. List Recognition ()          11. Story Recall ()          12. Figure Recall ()           Sum of Index Scores:  434      Total Score:  82      Percentile: 12%ile      Classification: Low Average        Not performed on this date.       SCORE DEFICIT RANGE  STATUS                                         COGNITIVE FXN                      MOCA (8.1)         Education Level = 12 years      Visuospatial/executive functioning 2/5      Naming 3/3      Memory: 1st trial: 5      Memory: 2nd trial:       Attention/concentration 2/2      List of letters:  1/      Serial Seven Subtraction: 33      Language/sentence repetition: 2/2      Language Fluency:  0/1      Abstract/Correlational Thinkin/2      Delayed Recall: 0/5      Orientation: 56      Memory Index Score 6/15                                         Total Score   Results falling into the mild neurocognitive impairments range                        PLANNED THERAPY INTERVENTIONS:  Internal and external memory aides  Memory and mental manipulation  Auditory processing with immediate recall  Memory retention with immediate and delayed recall  Pt edu on cognitive/memory apps to improve brain health  Functional memory tasks   Practice utilizing internal/external memory aides     INTERVENTION COMMENTS:  Diagnosis: MCI (mild cognitive impairment)  [G31.84]  Precautions: CAD, DM, Glaucoma, HTN  Insurance: Payor: VIMAL  REP / Plan: [employer]ET[employer] MEDICARE HMO [facility] REP / Product Type: Medicare HMO /   5 of ____ visits through _____, PN due 04/13/2025

## 2025-03-19 ENCOUNTER — APPOINTMENT (OUTPATIENT)
Dept: OCCUPATIONAL THERAPY | Facility: REHABILITATION | Age: 85
End: 2025-03-19
Payer: COMMERCIAL

## 2025-03-20 ENCOUNTER — APPOINTMENT (OUTPATIENT)
Dept: SPEECH THERAPY | Facility: REHABILITATION | Age: 85
End: 2025-03-20
Payer: COMMERCIAL

## 2025-03-26 ENCOUNTER — APPOINTMENT (OUTPATIENT)
Dept: OCCUPATIONAL THERAPY | Facility: REHABILITATION | Age: 85
End: 2025-03-26
Payer: COMMERCIAL

## 2025-03-27 ENCOUNTER — APPOINTMENT (OUTPATIENT)
Dept: SPEECH THERAPY | Facility: REHABILITATION | Age: 85
End: 2025-03-27
Payer: COMMERCIAL

## 2025-03-28 ENCOUNTER — APPOINTMENT (OUTPATIENT)
Dept: SPEECH THERAPY | Facility: REHABILITATION | Age: 85
End: 2025-03-28
Payer: COMMERCIAL

## 2025-04-08 ENCOUNTER — TELEPHONE (OUTPATIENT)
Age: 85
End: 2025-04-08

## 2025-04-08 NOTE — TELEPHONE ENCOUNTER
Patient called, requested AWV and pre op clearance be done, same appointment.  At this time, pt is scheduled with PCP (NGHIA), Monday, 4/21 @ 1pm for both AWV and pre op clearance.    Pt is asking if PCP has sooner availability, week of 4/14.   If sooner availability for AWV and pre op clearance, pt is kindly requesting a return call.  Phone# [phone].

## 2025-04-08 NOTE — TELEPHONE ENCOUNTER
Pt called back stating he got disconnected and wanted to confirm his appt.  Stated oral surgery was good with 4/21 date.      Saw it was a combined appt AWV and pre op.  Spoke to Mare to confirm that was correct.  She is asking Dr. Dumont if he can accommodate and will call pt back.

## 2025-04-15 ENCOUNTER — TELEPHONE (OUTPATIENT)
Dept: FAMILY MEDICINE CLINIC | Facility: CLINIC | Age: 85
End: 2025-04-15

## 2025-04-15 NOTE — TELEPHONE ENCOUNTER
Called pt and lvm in regards to appointment on 4/21/25 with Dr. Dumont as it is scheduled for an AWV and Pre-op appointment. Pt can't be seen for both at one appointment time.       Called pt back and lvm as there is an encounter in pt chart that stated it was ok to combine the AWV and Pre-op appointment as per Dr. Dumont.

## 2025-04-21 ENCOUNTER — APPOINTMENT (OUTPATIENT)
Dept: LAB | Facility: CLINIC | Age: 85
End: 2025-04-21
Payer: COMMERCIAL

## 2025-04-21 ENCOUNTER — OFFICE VISIT (OUTPATIENT)
Dept: FAMILY MEDICINE CLINIC | Facility: CLINIC | Age: 85
End: 2025-04-21
Payer: COMMERCIAL

## 2025-04-21 ENCOUNTER — TELEPHONE (OUTPATIENT)
Dept: OTHER | Facility: OTHER | Age: 85
End: 2025-04-21

## 2025-04-21 VITALS
TEMPERATURE: 98.5 F | OXYGEN SATURATION: 99 % | HEIGHT: 68 IN | SYSTOLIC BLOOD PRESSURE: 132 MMHG | HEART RATE: 39 BPM | WEIGHT: 188.2 LBS | DIASTOLIC BLOOD PRESSURE: 84 MMHG | BODY MASS INDEX: 28.52 KG/M2

## 2025-04-21 DIAGNOSIS — M16.11 OSTEOARTHRITIS OF RIGHT HIP, UNSPECIFIED OSTEOARTHRITIS TYPE: ICD-10-CM

## 2025-04-21 DIAGNOSIS — E11.22 TYPE 2 DIABETES MELLITUS WITH CHRONIC KIDNEY DISEASE, WITHOUT LONG-TERM CURRENT USE OF INSULIN, UNSPECIFIED CKD STAGE (HCC): ICD-10-CM

## 2025-04-21 DIAGNOSIS — E78.2 MIXED HYPERLIPIDEMIA: ICD-10-CM

## 2025-04-21 DIAGNOSIS — N18.30 STAGE 3 CHRONIC KIDNEY DISEASE, UNSPECIFIED WHETHER STAGE 3A OR 3B CKD (HCC): ICD-10-CM

## 2025-04-21 DIAGNOSIS — R79.1 ABNORMAL COAGULATION PROFILE: ICD-10-CM

## 2025-04-21 DIAGNOSIS — I10 BENIGN ESSENTIAL HYPERTENSION: ICD-10-CM

## 2025-04-21 DIAGNOSIS — N40.1 BENIGN PROSTATIC HYPERPLASIA WITH LOWER URINARY TRACT SYMPTOMS, SYMPTOM DETAILS UNSPECIFIED: ICD-10-CM

## 2025-04-21 DIAGNOSIS — Z01.818 PRE-OP EXAM: Primary | ICD-10-CM

## 2025-04-21 DIAGNOSIS — I25.10 ARTERIOSCLEROSIS OF CORONARY ARTERY: ICD-10-CM

## 2025-04-21 DIAGNOSIS — Z01.818 PREOPERATIVE CLEARANCE: ICD-10-CM

## 2025-04-21 DIAGNOSIS — R32 URINARY INCONTINENCE, UNSPECIFIED TYPE: ICD-10-CM

## 2025-04-21 DIAGNOSIS — Z00.00 MEDICARE ANNUAL WELLNESS VISIT, SUBSEQUENT: ICD-10-CM

## 2025-04-21 DIAGNOSIS — Z87.19 HISTORY OF DENTAL PROBLEMS: ICD-10-CM

## 2025-04-21 PROCEDURE — G0439 PPPS, SUBSEQ VISIT: HCPCS | Performed by: FAMILY MEDICINE

## 2025-04-21 PROCEDURE — 99214 OFFICE O/P EST MOD 30 MIN: CPT | Performed by: FAMILY MEDICINE

## 2025-04-21 PROCEDURE — G2211 COMPLEX E/M VISIT ADD ON: HCPCS | Performed by: FAMILY MEDICINE

## 2025-04-21 NOTE — ASSESSMENT & PLAN NOTE
Last A1c from October 2024 was 6.2%.  Continue reduced carb diet and exercise.  Patient will be getting repeat labs drawn today  Lab Results   Component Value Date    HGBA1C 6.5 (H) 04/22/2025       Orders:  •  Comprehensive metabolic panel; Future  •  Hemoglobin A1C; Future  •  TSH, 3rd generation with Free T4 reflex; Future  •  Albumin / creatinine urine ratio; Future

## 2025-04-21 NOTE — ASSESSMENT & PLAN NOTE
Blood pressure reasonably controlled 132/84.  Continue valsartan /25 and amlodipine 2.5  Orders:  •  CBC and differential; Future

## 2025-04-21 NOTE — ASSESSMENT & PLAN NOTE
Lab Results   Component Value Date    EGFR 45 04/22/2025    EGFR 44 10/17/2023    EGFR 41 05/10/2023    CREATININE 1.42 (H) 04/22/2025    CREATININE 1.47 (H) 10/17/2023    CREATININE 1.54 (H) 05/10/2023     GFR has been stable.  Will continue to follow

## 2025-04-21 NOTE — ASSESSMENT & PLAN NOTE
History of RCA and circumflex disease with stenting over 20 years ago.  Patient was discharged by cardiology a few years ago.  He has been doing well without chest pain or shortness of breath  Orders:  •  Echo stress test, exercise; Future

## 2025-04-21 NOTE — PATIENT INSTRUCTIONS
Medicare Preventive Visit Patient Instructions  Thank you for completing your Welcome to Medicare Visit or Medicare Annual Wellness Visit today. Your next wellness visit will be due in one year (4/22/2026).  The screening/preventive services that you may require over the next 5-10 years are detailed below. Some tests may not apply to you based off risk factors and/or age. Screening tests ordered at today's visit but not completed yet may show as past due. Also, please note that scanned in results may not display below.  Preventive Screenings:  Service Recommendations Previous Testing/Comments   Colorectal Cancer Screening  Colonoscopy    Fecal Occult Blood Test (FOBT)/Fecal Immunochemical Test (FIT)  Fecal DNA/Cologuard Test  Flexible Sigmoidoscopy Age: 45-75 years old   Colonoscopy: every 10 years (May be performed more frequently if at higher risk)  OR  FOBT/FIT: every 1 year  OR  Cologuard: every 3 years  OR  Sigmoidoscopy: every 5 years  Screening may be recommended earlier than age 45 if at higher risk for colorectal cancer. Also, an individualized decision between you and your healthcare provider will decide whether screening between the ages of 76-85 would be appropriate. Colonoscopy: 11/30/2015  FOBT/FIT: Not on file  Cologuard: Not on file  Sigmoidoscopy: Not on file          Prostate Cancer Screening Individualized decision between patient and health care provider in men between ages of 55-69   Medicare will cover every 12 months beginning on the day after your 50th birthday PSA: 0.9 ng/mL     Screening Not Indicated     Hepatitis C Screening Once for adults born between 1945 and 1965  More frequently in patients at high risk for Hepatitis C Hep C Antibody: Not on file        Diabetes Screening 1-2 times per year if you're at risk for diabetes or have pre-diabetes Fasting glucose: 141 mg/dL (10/17/2023)  A1C: 6.2 (10/17/2024)  Screening Not Indicated  History Diabetes   Cholesterol Screening Once every 5  years if you don't have a lipid disorder. May order more often based on risk factors. Lipid panel: 05/10/2023  Screening Not Indicated  History Lipid Disorder      Other Preventive Screenings Covered by Medicare:  Abdominal Aortic Aneurysm (AAA) Screening: covered once if your at risk. You're considered to be at risk if you have a family history of AAA or a male between the age of 65-75 who smoking at least 100 cigarettes in your lifetime.  Lung Cancer Screening: covers low dose CT scan once per year if you meet all of the following conditions: (1) Age 55-77; (2) No signs or symptoms of lung cancer; (3) Current smoker or have quit smoking within the last 15 years; (4) You have a tobacco smoking history of at least 20 pack years (packs per day x number of years you smoked); (5) You get a written order from a healthcare provider.  Glaucoma Screening: covered annually if you're considered high risk: (1) You have diabetes OR (2) Family history of glaucoma OR (3)  aged 50 and older OR (4)  American aged 65 and older  Osteoporosis Screening: covered every 2 years if you meet one of the following conditions: (1) Have a vertebral abnormality; (2) On glucocorticoid therapy for more than 3 months; (3) Have primary hyperparathyroidism; (4) On osteoporosis medications and need to assess response to drug therapy.  HIV Screening: covered annually if you're between the age of 15-65. Also covered annually if you are younger than 15 and older than 65 with risk factors for HIV infection. For pregnant patients, it is covered up to 3 times per pregnancy.    Immunizations:  Immunization Recommendations   Influenza Vaccine Annual influenza vaccination during flu season is recommended for all persons aged >= 6 months who do not have contraindications   Pneumococcal Vaccine   * Pneumococcal conjugate vaccine = PCV13 (Prevnar 13), PCV15 (Vaxneuvance), PCV20 (Prevnar 20)  * Pneumococcal polysaccharide vaccine = PPSV23  (Pneumovax) Adults 19-63 yo with certain risk factors or if 65+ yo  If never received any pneumonia vaccine: recommend Prevnar 20 (PCV20)  Give PCV20 if previously received 1 dose of PCV13 or PPSV23   Hepatitis B Vaccine 3 dose series if at intermediate or high risk (ex: diabetes, end stage renal disease, liver disease)   Respiratory syncytial virus (RSV) Vaccine - COVERED BY MEDICARE PART D  * RSVPreF3 (Arexvy) CDC recommends that adults 60 years of age and older may receive a single dose of RSV vaccine using shared clinical decision-making (SCDM)   Tetanus (Td) Vaccine - COST NOT COVERED BY MEDICARE PART B Following completion of primary series, a booster dose should be given every 10 years to maintain immunity against tetanus. Td may also be given as tetanus wound prophylaxis.   Tdap Vaccine - COST NOT COVERED BY MEDICARE PART B Recommended at least once for all adults. For pregnant patients, recommended with each pregnancy.   Shingles Vaccine (Shingrix) - COST NOT COVERED BY MEDICARE PART B  2 shot series recommended in those 19 years and older who have or will have weakened immune systems or those 50 years and older     Health Maintenance Due:      Topic Date Due   • Colorectal Cancer Screening  11/30/2020     Immunizations Due:      Topic Date Due   • COVID-19 Vaccine (4 - 2024-25 season) 09/01/2024     Advance Directives   What are advance directives?  Advance directives are legal documents that state your wishes and plans for medical care. These plans are made ahead of time in case you lose your ability to make decisions for yourself. Advance directives can apply to any medical decision, such as the treatments you want, and if you want to donate organs.   What are the types of advance directives?  There are many types of advance directives, and each state has rules about how to use them. You may choose a combination of any of the following:  Living will:  This is a written record of the treatment you want.  You can also choose which treatments you do not want, which to limit, and which to stop at a certain time. This includes surgery, medicine, IV fluid, and tube feedings.   Durable power of  for healthcare (DPAHC):  This is a written record that states who you want to make healthcare choices for you when you are unable to make them for yourself. This person, called a proxy, is usually a family member or a friend. You may choose more than 1 proxy.  Do not resuscitate (DNR) order:  A DNR order is used in case your heart stops beating or you stop breathing. It is a request not to have certain forms of treatment, such as CPR. A DNR order may be included in other types of advance directives.  Medical directive:  This covers the care that you want if you are in a coma, near death, or unable to make decisions for yourself. You can list the treatments you want for each condition. Treatment may include pain medicine, surgery, blood transfusions, dialysis, IV or tube feedings, and a ventilator (breathing machine).  Values history:  This document has questions about your views, beliefs, and how you feel and think about life. This information can help others choose the care that you would choose.  Why are advance directives important?  An advance directive helps you control your care. Although spoken wishes may be used, it is better to have your wishes written down. Spoken wishes can be misunderstood, or not followed. Treatments may be given even if you do not want them. An advance directive may make it easier for your family to make difficult choices about your care.   Fall Prevention    Fall prevention  includes ways to make your home and other areas safer. It also includes ways you can move more carefully to prevent a fall. Health conditions that cause changes in your blood pressure, vision, or muscle strength and coordination may increase your risk for falls. Medicines may also increase your risk for falls if they  make you dizzy, weak, or sleepy.   Fall prevention tips:   Stand or sit up slowly.    Use assistive devices as directed.    Wear shoes that fit well and have soles that .    Wear a personal alarm.    Stay active.    Manage your medical conditions.    Home Safety Tips:  Add items to prevent falls in the bathroom.    Keep paths clear.    Install bright lights in your home.    Keep items you use often on shelves within reach.    Paint or place reflective tape on the edges of your stairs.    Weight Management   Why it is important to manage your weight:  Being overweight increases your risk of health conditions such as heart disease, high blood pressure, type 2 diabetes, and certain types of cancer. It can also increase your risk for osteoarthritis, sleep apnea, and other respiratory problems. Aim for a slow, steady weight loss. Even a small amount of weight loss can lower your risk of health problems.  How to lose weight safely:  A safe and healthy way to lose weight is to eat fewer calories and get regular exercise. You can lose up about 1 pound a week by decreasing the number of calories you eat by 500 calories each day.   Healthy meal plan for weight management:  A healthy meal plan includes a variety of foods, contains fewer calories, and helps you stay healthy. A healthy meal plan includes the following:  Eat whole-grain foods more often.  A healthy meal plan should contain fiber. Fiber is the part of grains, fruits, and vegetables that is not broken down by your body. Whole-grain foods are healthy and provide extra fiber in your diet. Some examples of whole-grain foods are whole-wheat breads and pastas, oatmeal, brown rice, and bulgur.  Eat a variety of vegetables every day.  Include dark, leafy greens such as spinach, kale, noah greens, and mustard greens. Eat yellow and orange vegetables such as carrots, sweet potatoes, and winter squash.   Eat a variety of fruits every day.  Choose fresh or canned fruit  (canned in its own juice or light syrup) instead of juice. Fruit juice has very little or no fiber.  Eat low-fat dairy foods.  Drink fat-free (skim) milk or 1% milk. Eat fat-free yogurt and low-fat cottage cheese. Try low-fat cheeses such as mozzarella and other reduced-fat cheeses.  Choose meat and other protein foods that are low in fat.  Choose beans or other legumes such as split peas or lentils. Choose fish, skinless poultry (chicken or turkey), or lean cuts of red meat (beef or pork). Before you cook meat or poultry, cut off any visible fat.   Use less fat and oil.  Try baking foods instead of frying them. Add less fat, such as margarine, sour cream, regular salad dressing and mayonnaise to foods. Eat fewer high-fat foods. Some examples of high-fat foods include french fries, doughnuts, ice cream, and cakes.  Eat fewer sweets.  Limit foods and drinks that are high in sugar. This includes candy, cookies, regular soda, and sweetened drinks.  Exercise:  Exercise at least 30 minutes per day on most days of the week. Some examples of exercise include walking, biking, dancing, and swimming. You can also fit in more physical activity by taking the stairs instead of the elevator or parking farther away from stores. Ask your healthcare provider about the best exercise plan for you.      © Copyright [employer] 2018 Information is for End User's use only and may not be sold, redistributed or otherwise used for commercial purposes. All illustrations and images included in CareNotes® are the copyrighted property of [employer].A[employer]M., Inc[employer] or [employer]

## 2025-04-21 NOTE — PROGRESS NOTES
Medicare wellness and preop clearance    Name: Gadiel Gallegos      : 1940      MRN: [id number]  Encounter Provider: Tru Dumont DO  Encounter Date: 2025   Encounter department: [facility]  :  Assessment & Plan  Pre-op exam  Patient has upcoming extraction of remaining lower teeth, alveoplasty, with placement up to 5 dental implants under general anesthesia.  Surgery date is May 16.  Surgery will be performed by [facility].  Exam today is unremarkable.  EKG showed sinus rhythm at 71 bpm with frequent PVCs and pattern of bigeminy.  Patient will be seeing cardiology on  to discuss clearance.   Orders:  •  POCT ECG  •  Echo stress test, exercise; Future    History of dental problems  (See preop clearance)       Medicare annual wellness visit, subsequent  This is a Medicare wellness visit and preop clearance for patient.  He has upcoming extraction of remaining lower teeth, alveoplasty, with placement up to 5 dental implants under general anesthesia.  Surgery date is May 16.  Surgery will be performed by [facility].  Otherwise, patient is doing well.  Denies any complaints today.  He states his diet is healthy.  He exercises regularly; walking half mile 4 times a week.  He is a non-smoker.  Drinks approximately 3 cocktails yearly.  He consumes up to 4 cups of coffee daily.  On examination, blood pressure is controlled.  BMI 29.04.  Remainder of exam was unremarkable.  Recommend continue to follow healthy diet and regular exercise program.       Type 2 diabetes mellitus with chronic kidney disease, without long-term current use of insulin, unspecified CKD stage (HCC)  Last A1c from 2024 was 6.2%.  Continue reduced carb diet and exercise.  Patient will be getting repeat labs drawn today  Lab Results   Component Value Date    HGBA1C 6.5 (H) 2025       Orders:  •  Comprehensive metabolic panel; Future  •  Hemoglobin A1C; Future  •  TSH, 3rd generation  with Free T4 reflex; Future  •  Albumin / creatinine urine ratio; Future    Arteriosclerosis of coronary artery  History of RCA and circumflex disease with stenting over 20 years ago.  Patient was discharged by cardiology a few years ago.  He has been doing well without chest pain or shortness of breath  Orders:  •  Echo stress test, exercise; Future    Benign essential hypertension  Blood pressure reasonably controlled 132/84.  Continue valsartan /25 and amlodipine 2.5  Orders:  •  CBC and differential; Future    Mixed hyperlipidemia  Continue low-fat diet and exercise  Orders:  •  Lipid Panel with Direct LDL reflex; Future    Stage 3 chronic kidney disease, unspecified whether stage 3a or 3b CKD ([facility])  Lab Results   Component Value Date    EGFR 45 04/22/2025    EGFR 44 10/17/2023    EGFR 41 05/10/2023    CREATININE 1.42 (H) 04/22/2025    CREATININE 1.47 (H) 10/17/2023    CREATININE 1.54 (H) 05/10/2023     GFR has been stable.  Will continue to follow       Osteoarthritis of right hip, unspecified osteoarthritis type  Status post right total hip arthroplasty.  Doing well       Benign prostatic hyperplasia with lower urinary tract symptoms, symptom details unspecified  Stable on tamsulosin and finasteride       Urinary incontinence, unspecified type         Preoperative clearance    Orders:  •  Protime-INR; Future    Abnormal coagulation profile    Orders:  •  Protime-INR; Future       Preventive health issues were discussed with patient, and age appropriate screening tests were ordered as noted in patient's After Visit Summary. Personalized health advice and appropriate referrals for health education or preventive services given if needed, as noted in patient's After Visit Summary.      Current with pneumonia vaccination  Last tetanus booster 2024  Patient had Shingrix vaccination    Patient will be getting labs drawn today.  Will call with results    6 months (most likely CMP, A1c, microalbumin  prior)      History of Present Illness     This is a Medicare wellness visit and preop clearance for patient.  He has upcoming extraction of remaining lower teeth, alveoplasty, with placement up to 5 dental implants under general anesthesia.  Surgery date is May 16.  Surgery will be performed by [facility].  Otherwise, patient is doing well.  Denies any complaints today.  He states his diet is healthy.  He exercises regularly; walking half mile 4 times a week.  He is a non-smoker.  Drinks approximately 3 cocktails yearly.  He consumes up to 4 cups of coffee daily.       Patient Care Team:  Tru Dumont DO as PCP - General  MD Tani Harris PA-MD Otis Sumner MD Shweta Batra, RD as Diabetes Educator (Nutrition)    Review of Systems   Constitutional:  Negative for chills and fever.   HENT:  Negative for ear pain and sore throat.    Eyes:  Negative for pain and visual disturbance.   Respiratory:  Negative for cough and shortness of breath.    Cardiovascular:  Negative for chest pain and palpitations.   Gastrointestinal:  Negative for abdominal pain and vomiting.   Genitourinary:  Negative for dysuria and hematuria.   Musculoskeletal:  Negative for arthralgias and back pain.   Skin:  Negative for color change and rash.   Neurological:  Negative for seizures and syncope.   All other systems reviewed and are negative.    Medical History Reviewed by provider this encounter:  Tobacco  Allergies  Meds  Problems  Med Hx  Surg Hx  Fam Hx       Annual Wellness Visit Questionnaire   Gadiel is here for his Subsequent Wellness visit.     Health Risk Assessment:   Patient rates overall health as very good. Patient feels that their physical health rating is same. Patient is satisfied with their life. Eyesight was rated as slightly better. Hearing was rated as same. Patient feels that their emotional and mental health rating is same. Patients states they are never, rarely  angry. Patient states they are never, rarely unusually tired/fatigued. Pain experienced in the last 7 days has been none. Patient states that he has experienced no weight loss or gain in last 6 months.     Depression Screening:   PHQ-2 Score: 0      Fall Risk Screening:   In the past year, patient has experienced: history of falling in past year    Number of falls: 1  Injured during fall?: Yes    Feels unsteady when standing or walking?: No    Worried about falling?: No      Home Safety:  Patient does not have trouble with stairs inside or outside of their home. Patient has working smoke alarms and has no working carbon monoxide detector. Home safety hazards include: none.     Nutrition:   Current diet is Regular.     Medications:   Patient is currently taking over-the-counter supplements. OTC medications include: see medication list. Patient is able to manage medications.     Activities of Daily Living (ADLs)/Instrumental Activities of Daily Living (IADLs):   Walk and transfer into and out of bed and chair?: Yes  Dress and groom yourself?: Yes    Bathe or shower yourself?: Yes    Feed yourself? Yes  Do your laundry/housekeeping?: Yes  Manage your money, pay your bills and track your expenses?: Yes  Make your own meals?: Yes    Do your own shopping?: Yes    Previous Hospitalizations:   Any hospitalizations or ED visits within the last 12 months?: No      Advance Care Planning:   Living will: Yes    Durable POA for healthcare: Yes    Advanced directive: Yes    Five wishes given: No      Cognitive Screening:   Provider or family/friend/caregiver concerned regarding cognition?: No    Preventive Screenings      Cardiovascular Screening:    General: Screening Not Indicated and History Lipid Disorder      Diabetes Screening:     General: Screening Not Indicated and History Diabetes      Colorectal Cancer Screening:     General: Risks and Benefits Discussed      Prostate Cancer Screening:    General: Screening Not  "Indicated      Abdominal Aortic Aneurysm (AAA) Screening:    Risk factors include: tobacco use        General: Risks and Benefits Discussed      Lung Cancer Screening:     General: Screening Not Indicated      Hepatitis C Screening:    General: Risks and Benefits Discussed    Screening, Brief Intervention, and Referral to Treatment (SBIRT)     Screening  Typical number of drinks in a day: 0  Typical number of drinks in a week: 0  Interpretation: Low risk drinking behavior.    Single Item Drug Screening:  How often have you used an illegal drug (including marijuana) or a prescription medication for non-medical reasons in the past year? never    Single Item Drug Screen Score: 0  Interpretation: Negative screen for possible drug use disorder    Social Drivers of Health     Financial Resource Strain: Low Risk  (10/16/2023)    Overall Financial Resource Strain (CARDIA)    • Difficulty of Paying Living Expenses: Not hard at all   Food Insecurity: No Food Insecurity (4/21/2025)    Hunger Vital Sign    • Worried About Running Out of Food in the Last Year: Never true    • Ran Out of Food in the Last Year: Never true   Transportation Needs: No Transportation Needs (4/21/2025)    PRAPARE - Transportation    • Lack of Transportation (Medical): No    • Lack of Transportation (Non-Medical): No   Housing Stability: Low Risk  (4/21/2025)    Housing Stability Vital Sign    • Unable to Pay for Housing in the Last Year: No    • Number of Times Moved in the Last Year: 0    • Homeless in the Last Year: No   Utilities: Not At Risk (4/21/2025)    [facility] Utilities    • Threatened with loss of utilities: No     No results found.    Objective   /84   Pulse (!) 39   Temp 98.5 °F (36.9 °C) (Temporal)   Ht 5' 7.5\" (1.715 m)   Wt 85.4 kg (188 lb 3.2 oz)   SpO2 99%   BMI 29.04 kg/m²     Physical Exam  Vitals and nursing note reviewed.   Constitutional:       General: He is not in acute distress.     Appearance: He is well-developed. "   HENT:      Head: Normocephalic and atraumatic.   Eyes:      Conjunctiva/sclera: Conjunctivae normal.   Cardiovascular:      Rate and Rhythm: Normal rate and regular rhythm.      Heart sounds: No murmur heard.  Pulmonary:      Effort: Pulmonary effort is normal. No respiratory distress.      Breath sounds: Normal breath sounds.   Abdominal:      Palpations: Abdomen is soft.      Tenderness: There is no abdominal tenderness.   Musculoskeletal:         General: No swelling.      Cervical back: Neck supple.   Skin:     General: Skin is warm and dry.      Capillary Refill: Capillary refill takes less than 2 seconds.   Neurological:      Mental Status: He is alert.   Psychiatric:         Mood and Affect: Mood normal.

## 2025-04-21 NOTE — TELEPHONE ENCOUNTER
Patient called stating he received a call form the office a few minutes ago. Writer was not able to find any notes or telephone encounters.     Please advise.

## 2025-04-22 ENCOUNTER — APPOINTMENT (OUTPATIENT)
Dept: LAB | Facility: CLINIC | Age: 85
End: 2025-04-22
Attending: FAMILY MEDICINE
Payer: COMMERCIAL

## 2025-04-22 ENCOUNTER — TELEPHONE (OUTPATIENT)
Age: 85
End: 2025-04-22

## 2025-04-22 LAB
ALBUMIN SERPL BCG-MCNC: 3.9 G/DL (ref 3.5–5)
ALP SERPL-CCNC: 37 U/L (ref 34–104)
ALT SERPL W P-5'-P-CCNC: 11 U/L (ref 7–52)
ANION GAP SERPL CALCULATED.3IONS-SCNC: 10 MMOL/L (ref 4–13)
AST SERPL W P-5'-P-CCNC: 13 U/L (ref 13–39)
BASOPHILS # BLD AUTO: 0.06 THOUSANDS/ÂΜL (ref 0–0.1)
BASOPHILS NFR BLD AUTO: 1 % (ref 0–1)
BILIRUB SERPL-MCNC: 0.62 MG/DL (ref 0.2–1)
BUN SERPL-MCNC: 24 MG/DL (ref 5–25)
CALCIUM SERPL-MCNC: 10.4 MG/DL (ref 8.4–10.2)
CHLORIDE SERPL-SCNC: 106 MMOL/L (ref 96–108)
CHOLEST SERPL-MCNC: 171 MG/DL (ref ?–200)
CO2 SERPL-SCNC: 24 MMOL/L (ref 21–32)
CREAT SERPL-MCNC: 1.42 MG/DL (ref 0.6–1.3)
CREAT UR-MCNC: 114.9 MG/DL
EOSINOPHIL # BLD AUTO: 0.14 THOUSAND/ÂΜL (ref 0–0.61)
EOSINOPHIL NFR BLD AUTO: 2 % (ref 0–6)
ERYTHROCYTE [DISTWIDTH] IN BLOOD BY AUTOMATED COUNT: 12.9 % (ref 11.6–15.1)
EST. AVERAGE GLUCOSE BLD GHB EST-MCNC: 140 MG/DL
GFR SERPL CREATININE-BSD FRML MDRD: 45 ML/MIN/1.73SQ M
GLUCOSE P FAST SERPL-MCNC: 132 MG/DL (ref 65–99)
HBA1C MFR BLD: 6.5 %
HCT VFR BLD AUTO: 45 % (ref 36.5–49.3)
HDLC SERPL-MCNC: 48 MG/DL
HGB BLD-MCNC: 15 G/DL (ref 12–17)
IMM GRANULOCYTES # BLD AUTO: 0.02 THOUSAND/UL (ref 0–0.2)
IMM GRANULOCYTES NFR BLD AUTO: 0 % (ref 0–2)
INR PPP: 1.1 (ref 0.85–1.19)
LDLC SERPL CALC-MCNC: 105 MG/DL (ref 0–100)
LYMPHOCYTES # BLD AUTO: 1.2 THOUSANDS/ÂΜL (ref 0.6–4.47)
LYMPHOCYTES NFR BLD AUTO: 20 % (ref 14–44)
MCH RBC QN AUTO: 31.6 PG (ref 26.8–34.3)
MCHC RBC AUTO-ENTMCNC: 33.3 G/DL (ref 31.4–37.4)
MCV RBC AUTO: 95 FL (ref 82–98)
MICROALBUMIN UR-MCNC: 18.4 MG/L
MICROALBUMIN/CREAT 24H UR: 16 MG/G CREATININE (ref 0–30)
MONOCYTES # BLD AUTO: 0.63 THOUSAND/ÂΜL (ref 0.17–1.22)
MONOCYTES NFR BLD AUTO: 11 % (ref 4–12)
NEUTROPHILS # BLD AUTO: 3.87 THOUSANDS/ÂΜL (ref 1.85–7.62)
NEUTS SEG NFR BLD AUTO: 66 % (ref 43–75)
NRBC BLD AUTO-RTO: 0 /100 WBCS
PLATELET # BLD AUTO: 182 THOUSANDS/UL (ref 149–390)
PMV BLD AUTO: 11.4 FL (ref 8.9–12.7)
POTASSIUM SERPL-SCNC: 3.9 MMOL/L (ref 3.5–5.3)
PROT SERPL-MCNC: 6.5 G/DL (ref 6.4–8.4)
PROTHROMBIN TIME: 14.5 SECONDS (ref 12.3–15)
RBC # BLD AUTO: 4.74 MILLION/UL (ref 3.88–5.62)
SODIUM SERPL-SCNC: 140 MMOL/L (ref 135–147)
TRIGL SERPL-MCNC: 92 MG/DL (ref ?–150)
TSH SERPL DL<=0.05 MIU/L-ACNC: 1.56 UIU/ML (ref 0.45–4.5)
WBC # BLD AUTO: 5.92 THOUSAND/UL (ref 4.31–10.16)

## 2025-04-22 PROCEDURE — 85610 PROTHROMBIN TIME: CPT

## 2025-04-22 PROCEDURE — 83036 HEMOGLOBIN GLYCOSYLATED A1C: CPT

## 2025-04-22 PROCEDURE — 82043 UR ALBUMIN QUANTITATIVE: CPT

## 2025-04-22 PROCEDURE — 80061 LIPID PANEL: CPT

## 2025-04-22 PROCEDURE — 80053 COMPREHEN METABOLIC PANEL: CPT

## 2025-04-22 PROCEDURE — 82570 ASSAY OF URINE CREATININE: CPT

## 2025-04-22 PROCEDURE — 85025 COMPLETE CBC W/AUTO DIFF WBC: CPT

## 2025-04-22 PROCEDURE — 84443 ASSAY THYROID STIM HORMONE: CPT

## 2025-04-22 PROCEDURE — 36415 COLL VENOUS BLD VENIPUNCTURE: CPT

## 2025-04-22 NOTE — TELEPHONE ENCOUNTER
Placed call to patient to find out why he wants the records printed. If it's for Dr. Hopkins to see, we do not have to print it out as it is visible to him in EPIC. But if it is for the patient to have for themselves, we will do so.     No answer. Left vm for patient to return call.     Ok to discuss with CTS.

## 2025-04-22 NOTE — TELEPHONE ENCOUNTER
Patient returned call stating missed an call from the practice would want to speak to Dr. Dumont it is about his pre op clearance. Warm transferred the call to practice clinical went answered. He mentioned the cardiologist who he visit has retired. He would want Dr. Dumont to give an recommendation. Please do call the patient with the doctor info. Thanks

## 2025-04-22 NOTE — TELEPHONE ENCOUNTER
Caller: Patient    Doctor: Dr. Hopkins    Call back #: [phone]    Reason for call: Patient had requested for his records from Dr. Isbell from [id number] be printed out for him at the time of his next visit with Dr. Hopkins on May 7th.

## 2025-04-23 ENCOUNTER — RESULTS FOLLOW-UP (OUTPATIENT)
Dept: FAMILY MEDICINE CLINIC | Facility: CLINIC | Age: 85
End: 2025-04-23

## 2025-04-23 NOTE — TELEPHONE ENCOUNTER
Patient returned call, Conveyed lab results review from provider Dr. Dumont, patient expressed understanding. Questions if results clear patient for surgery, patient request callback with suggestions. Please advise Patient at [phone], if any further questions .

## 2025-05-06 NOTE — PROGRESS NOTES
Cardiology Consultation     Gadiel Gallegos  [id number]  1940  [facility] CARDIOLOGY [place]  [address] [place] [id number]      1. Preop cardiovascular exam  POCT ECG      2. Arteriosclerosis of coronary artery  POCT ECG      3. Benign essential hypertension        4. Mixed hyperlipidemia        5. Stage 3 chronic kidney disease, unspecified whether stage 3a or 3b CKD (HCC)            Discussion/Summary:  Preoperative vascular exam  - Patient scheduled for a dental implant on 5/20/2025  - No acute ischemic changes on ECG today  - Able to complete greater than 4 METS of activity without any chest pain or other concerning cardiac symptoms  - Given his advanced age and comorbidities, likely at a low to intermediate cardiovascular risk for his upcoming surgery  - Due to his PVCs on exam and ventricular bigeminy on his ECG by his PCP, will check a 48-hour Holter monitor, echocardiogram and nuclear stress test to better stratify the patient  - If no concerning findings on the above testing, he would be stable from a cardiac standpoint to proceed with his surgery  Coronary artery disease  - [facility] 8/25/2003 status post PCI with CHRIS x 1 to the RCA  - [facility] 3/25/2004 status post PCI with CHRIS x 1 to the mid LCx into OM 2  - Echo stress test 5/29/2015 showed EF 60%, patient exercised for 8 minutes and achieved 10.1 METS, no evidence of inducible ischemia by ECG or echocardiogram  - Currently on aspirin 325 mg daily, instructed him he can lowered to 81 mg daily again  - Given his cardiac history and plan for surgery, will check a transthoracic echo and nuclear exercise stress test to better assess his underlying coronary artery disease  PVCs  - EKG 4/21/2025 shows sinus rhythm with PVCs in the pattern of bigeminy  - No PVCs on EKG today 5/7/2025   - Will check a 48-hour Holter monitor to better assess PVC burden  Hypertension  - Continue with amlodipine 2.5 mg daily, and valsartan/HCTZ 320/25 mg daily  - Blood  pressure is well-controlled today  Hyperlipidemia  - Not on statin currently  - On omega-3 fatty acid and red yeast rice extract  - Lipid profile 4/22/2025 showed total cholesterol 171, triglycerides 92, HDL 48,   Type 2 diabetes  CKD stage III  BPH  Mild cognitive impairment    Follow-up in 3 months.    History of Present Illness:  Gadiel Gallegos is a 84 y.o. year old male with a past medical history of coronary artery disease, hypertension, hyperlipidemia, type 2 diabetes, CKD stage III, BPH, and mild cognitive impairment.  He is accompanied by his wife today.  Overall he reports feeling well and has no cardiac complaints.  Over last 2 to 3 years, he reports being less active which he relates to feeling less motivated to do things.  He is able to go grocery shopping for 1 to 2 hours without symptoms and ascend a flight of stairs again without any cardiac symptoms.  He used to walk several miles a day, but stopped again about 2 to 3 years ago.  Denies any chest pain, palpitations, shortness of breath, lower extremity edema, or other concerning cardiac symptoms at this time.  His wife had a recent fall and her had a knee replacement surgery, so due to this she has less active.  He tends to do more of the work around the house due to this.        Patient Active Problem List   Diagnosis    Arteriosclerosis of coronary artery    Benign essential hypertension    Benign prostatic hyperplasia    Type 2 diabetes mellitus with chronic kidney disease, without long-term current use of insulin, unspecified CKD stage (HCC)    Hyperlipidemia    Osteoarthritis of right hip    Primary insomnia    Vitamin D deficiency    Allergic rhinitis    MCI (mild cognitive impairment)    Bladder calculus    Symptomatic bradycardia    Chronic nasal congestion    Urinary incontinence    Right groin pain    Overweight (BMI 25.0-29.9)    Stage 3 chronic kidney disease, unspecified whether stage 3a or 3b CKD (HCC)    Hammertoe of left foot  "   Palpitations    Fatigue    Pre-op examination     Past Medical History:   Diagnosis Date    Abnormal blood chemistry     Last Assessed: 7/19/2013    Abnormal glucose     Last Assessed: 11/6/2015    Arthritis     Bladder tumor     BPH (benign prostatic hypertrophy)     Cataracts, bilateral     \"start of\"    Coronary artery disease     2 stents. MI x2    Diabetes mellitus (HCC)     Diet controlled pre-diabetic    Erectile dysfunction of non-organic origin     Last Assessed: 5/9/2014    Fatigue     Resolved: 2/23/2015    Glaucoma     \"Start of\"    Hematuria, microscopic     Last Assessed: 11/7/2016    History of bladder stone     Hyperlipidemia     Hypertension     Knee pain, bilateral     If walking far. 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   Tobacco Use    Smoking status: Former     Types: Cigarettes, Pipe    Smokeless tobacco: Never    Tobacco comments:     Quit 50 years ago and only smoked for 1 year   Vaping Use    Vaping status: Never Used   Substance and Sexual Activity    Alcohol use: Yes     Alcohol/week: 2.0 standard drinks of alcohol     Types: 2 Shots of liquor per week     Comment: 2 shots of liquor weekly    Drug use: No    Sexual activity: Not on file   Other Topics Concern    Not on file   Social History Narrative    Always uses seat belt    Daily caffeine consumption, 2-3 servings a day    Feels safe at home     Social Drivers of Health     Financial Resource Strain: Low Risk  " (10/16/2023)    Overall Financial Resource Strain (CARDIA)     Difficulty of Paying Living Expenses: Not hard at all   Food Insecurity: No Food Insecurity (4/21/2025)    Hunger Vital Sign     Worried About Running Out of Food in the Last Year: Never true     Ran Out of Food in the Last Year: Never true   Transportation Needs: No Transportation Needs (4/21/2025)    PRAPARE - Transportation     Lack of Transportation (Medical): No     Lack of Transportation (Non-Medical): No   Physical Activity: Not on file   Stress: Not on file   Social Connections: Not on file   Intimate Partner Violence: Not on file   Housing Stability: Low Risk  (4/21/2025)    Housing Stability Vital Sign     Unable to Pay for Housing in the Last Year: No     Number of Times Moved in the Last Year: 0     Homeless in the Last Year: No      Family History   Problem Relation Age of Onset    No Known Problems Mother         Diabetes per [employer]    No Known Problems Father     Skin cancer Brother     Diabetes Brother     Bipolar disorder Other     Diabetes Family     Hypertension Family     Substance Abuse Neg Hx     Alcohol abuse Neg Hx      Past Surgical History:   Procedure Laterality Date    BLADDER STONE REMOVAL      Laser procedure    CARDIAC CATHETERIZATION  2003    RCA stenting and 2004 stenting to circuflex    COLONOSCOPY      CORONARY ANGIOPLASTY WITH STENT PLACEMENT      Has 2 stents    CYSTOSCOPY  11/11/2015    w/ Cystolitholapaxy    HERNIA REPAIR Bilateral 03/05/2015    Inguinal    JOINT REPLACEMENT Bilateral     TKR    JOINT REPLACEMENT Right     Right THR    NV CYSTO W/REMOVAL OF LESIONS SMALL N/A 2/8/2017    Procedure: CYSTOSCOPY WITH BIOPSIES WITH FULGURATION, INSTILLATION OF MYTOMYCIN, DIALATION OF MEATUS ;  Surgeon: Steven Fisher MD;  Location: [facility];  Service: Urology    WISDOM TOOTH EXTRACTION         Current Outpatient Medications:     acetaminophen (TYLENOL) 325 mg tablet, Take 650 mg by mouth every 6 (six) hours  as needed for mild pain, Disp: , Rfl:     amLODIPine (NORVASC) 2.5 mg tablet, Take 1 tablet (2.5 mg total) by mouth daily at bedtime, Disp: 90 tablet, Rfl: 1    aspirin (ECOTRIN) 325 mg EC tablet, Take 1 tablet by mouth, Disp: , Rfl:     cholecalciferol (VITAMIN D3) 1,000 units tablet, Take by mouth, Disp: , Rfl:     finasteride (PROSCAR) 5 mg tablet, Take 1 tablet (5 mg total) by mouth daily, Disp: 90 tablet, Rfl: 1    ketoconazole (NIZORAL) 2 % cream, , Disp: , Rfl: 0    nitroglycerin (NITROSTAT) 0.4 mg SL tablet, Place 1 tablet (0.4 mg total) under the tongue every 5 (five) minutes as needed for chest pain, Disp: 5 tablet, Rfl: 2    Omega-3 Fatty Acids (OMEGA 3 PO), Take 2 capsules by mouth daily, Disp: , Rfl:     oxybutynin (DITROPAN-XL) 10 MG 24 hr tablet, Take 1 tablet (10 mg total) by mouth daily at bedtime, Disp: 90 tablet, Rfl: 3    Red Yeast Rice Extract (RED YEAST RICE PO), Take 2 capsules by mouth daily, Disp: , Rfl:     tamsulosin (FLOMAX) 0.4 mg, Take 1 capsule (0.4 mg total) by mouth 2 (two) times a day, Disp: 180 capsule, Rfl: 1    valsartan-hydrochlorothiazide (DIOVAN-HCT) 320-25 MG per tablet, Take 1 tablet by mouth daily at bedtime, Disp: 90 tablet, Rfl: 1    fluticasone (FLONASE) 50 mcg/act nasal spray, 2 sprays into each nostril daily for 30 days (Patient taking differently: 2 sprays into each nostril if needed AS NEEDED), Disp: 1 Bottle, Rfl: 1  Allergies   Allergen Reactions    Penicillin G Facial Swelling         Labs:  Lab Results   Component Value Date    ALT 11 04/22/2025    AST 13 04/22/2025    BUN 24 04/22/2025    CALCIUM 10.4 (H) 04/22/2025     04/22/2025    CHOL 169 07/31/2017    CO2 24 04/22/2025    CREATININE 1.42 (H) 04/22/2025    HDL 48 04/22/2025    HCT 45.0 04/22/2025    HGB 15.0 04/22/2025    HGBA1C 6.5 (H) 04/22/2025    MG 2.1 02/08/2019     04/22/2025    K 3.9 04/22/2025    PSA 0.9 05/10/2023     07/31/2017    TRIG 92 04/22/2025    WBC 5.92 04/22/2025  "      Imaging: No results found.    EC2025: Normal sinus rhythm, LAD, poor R wave progression    Review of Systems:  Review of Systems   Constitutional:  Negative for chills, diaphoresis, fatigue and fever.   HENT:  Negative for congestion.    Eyes:  Negative for photophobia and visual disturbance.   Respiratory:  Negative for chest tightness and shortness of breath.    Cardiovascular:  Negative for chest pain, palpitations and leg swelling.   Gastrointestinal:  Negative for abdominal distention, abdominal pain, diarrhea, nausea and vomiting.   Genitourinary:  Negative for difficulty urinating and dysuria.   Musculoskeletal:  Negative for arthralgias, gait problem and joint swelling.   Skin:  Negative for color change, pallor and rash.   Neurological:  Negative for dizziness, syncope, numbness and headaches.   Psychiatric/Behavioral:  Negative for agitation, behavioral problems and confusion.          Vitals:    25 0946   BP: 116/70   Pulse: (!) 45   SpO2: 100%      Vitals:    25 0946   Weight: 83.5 kg (184 lb)     Height: 5' 7.5\" (171.5 cm)     Physical Exam:  General appearance:  Appears stated age, alert, well appearing and in no distress  HEENT:  PERRLA, EOMI, no scleral icterus, no conjunctival pallor  NECK:  Supple, No elevated JVP, no thyromegaly, no carotid bruits  HEART:  Regular rate and rhythm, normal S1/S2, no S3/S4, 2/6 systolic murmur  LUNGS:  Clear to auscultation bilaterally  ABDOMEN:  Soft, non-tender, positive bowel sounds, no rebound or guarding, no organomegaly   EXTREMITIES:  No edema  VASCULAR:  Normal pedal pulses   SKIN: No lesions or rashes on exposed skin  NEURO:  CN II-XII intact, no focal deficits   "

## 2025-05-07 ENCOUNTER — OFFICE VISIT (OUTPATIENT)
Dept: CARDIOLOGY CLINIC | Facility: CLINIC | Age: 85
End: 2025-05-07
Payer: COMMERCIAL

## 2025-05-07 VITALS
HEART RATE: 45 BPM | WEIGHT: 184 LBS | SYSTOLIC BLOOD PRESSURE: 116 MMHG | DIASTOLIC BLOOD PRESSURE: 70 MMHG | HEIGHT: 68 IN | OXYGEN SATURATION: 100 % | BODY MASS INDEX: 27.89 KG/M2

## 2025-05-07 DIAGNOSIS — I10 BENIGN ESSENTIAL HYPERTENSION: ICD-10-CM

## 2025-05-07 DIAGNOSIS — I25.10 ARTERIOSCLEROSIS OF CORONARY ARTERY: ICD-10-CM

## 2025-05-07 DIAGNOSIS — E78.2 MIXED HYPERLIPIDEMIA: ICD-10-CM

## 2025-05-07 DIAGNOSIS — N18.30 STAGE 3 CHRONIC KIDNEY DISEASE, UNSPECIFIED WHETHER STAGE 3A OR 3B CKD (HCC): ICD-10-CM

## 2025-05-07 DIAGNOSIS — Z01.810 PREOP CARDIOVASCULAR EXAM: Primary | ICD-10-CM

## 2025-05-07 DIAGNOSIS — I49.3 PVC (PREMATURE VENTRICULAR CONTRACTION): ICD-10-CM

## 2025-05-07 PROCEDURE — 93000 ELECTROCARDIOGRAM COMPLETE: CPT | Performed by: STUDENT IN AN ORGANIZED HEALTH CARE EDUCATION/TRAINING PROGRAM

## 2025-05-07 PROCEDURE — 99214 OFFICE O/P EST MOD 30 MIN: CPT | Performed by: STUDENT IN AN ORGANIZED HEALTH CARE EDUCATION/TRAINING PROGRAM

## 2025-05-07 PROCEDURE — G2211 COMPLEX E/M VISIT ADD ON: HCPCS | Performed by: STUDENT IN AN ORGANIZED HEALTH CARE EDUCATION/TRAINING PROGRAM

## 2025-05-12 ENCOUNTER — TELEPHONE (OUTPATIENT)
Age: 85
End: 2025-05-12

## 2025-05-12 DIAGNOSIS — I10 BENIGN ESSENTIAL HYPERTENSION: ICD-10-CM

## 2025-05-12 DIAGNOSIS — Z01.810 PREOP CARDIOVASCULAR EXAM: Primary | ICD-10-CM

## 2025-05-12 DIAGNOSIS — I25.10 ARTERIOSCLEROSIS OF CORONARY ARTERY: ICD-10-CM

## 2025-05-12 NOTE — TELEPHONE ENCOUNTER
Received a call from [facility], pt is scheduled on 6/20/2025 calling about cardiac clearance.     Advised testing is being suggested, pt is scheduled on 6/18/2025 for tesing. Verbally understood and will follow up after that date.       C/b [phone]

## 2025-05-20 NOTE — TELEPHONE ENCOUNTER
Received call from [facility] regarding patient's Cardiac Clearance. They were under the impression that patient's testing was for 5/18, but it is actually scheduled for 6/18. They are requesting we change the order to stat so patient can be scheduled sooner. Please advise.

## 2025-05-21 ENCOUNTER — TELEPHONE (OUTPATIENT)
Dept: CARDIOLOGY CLINIC | Facility: CLINIC | Age: 85
End: 2025-05-21

## 2025-05-21 NOTE — TELEPHONE ENCOUNTER
Called Felicia for assistance to schedule sooner testing called pt left detailed message   Echo @ [facility] is 5/23 @ 11am  Nuclear Stress @ [address] 5/28 @ 7:45am the pts oral surgeon has requested sooner(echo can be stats but nuclear stress can not so I r/s the [facility] )

## 2025-05-23 ENCOUNTER — HOSPITAL ENCOUNTER (OUTPATIENT)
Dept: NON INVASIVE DIAGNOSTICS | Facility: HOSPITAL | Age: 85
Discharge: HOME | End: 2025-05-23
Payer: COMMERCIAL

## 2025-05-23 VITALS
BODY MASS INDEX: 27.89 KG/M2 | HEIGHT: 68 IN | WEIGHT: 184 LBS | DIASTOLIC BLOOD PRESSURE: 67 MMHG | SYSTOLIC BLOOD PRESSURE: 149 MMHG | HEART RATE: 66 BPM

## 2025-05-23 DIAGNOSIS — I25.10 ARTERIOSCLEROSIS OF CORONARY ARTERY: ICD-10-CM

## 2025-05-23 DIAGNOSIS — I49.3 PVC (PREMATURE VENTRICULAR CONTRACTION): ICD-10-CM

## 2025-05-23 DIAGNOSIS — I10 BENIGN ESSENTIAL HYPERTENSION: ICD-10-CM

## 2025-05-23 DIAGNOSIS — Z01.810 PREOP CARDIOVASCULAR EXAM: ICD-10-CM

## 2025-05-23 LAB
AORTIC ROOT: 4.1 CM
AORTIC VALVE MEAN VELOCITY: 8.7 M/S
ASCENDING AORTA: 4.6 CM
AV AREA BY CONTINUOUS VTI: 3.2 CM2
AV AREA PEAK VELOCITY: 3.2 CM2
AV LVOT MEAN GRADIENT: 2 MMHG
AV LVOT PEAK GRADIENT: 4 MMHG
AV MEAN PRESS GRAD SYS DOP V1V2: 4 MMHG
AV ORIFICE AREA US: 3.16 CM2
AV PEAK GRADIENT: 6 MMHG
AV VELOCITY RATIO: 0.76
AV VMAX SYS DOP: 1.25 M/S
BSA FOR ECHO PROCEDURE: 1.96 M2
DOP CALC AO VTI: 26.87 CM
DOP CALC LVOT AREA: 4.15 CM2
DOP CALC LVOT CARDIAC INDEX: 2.58 L/MIN/M2
DOP CALC LVOT CARDIAC OUTPUT: 5.06 L/MIN
DOP CALC LVOT DIAMETER: 2.3 CM
DOP CALC LVOT PEAK VEL VTI: 20.47 CM
DOP CALC LVOT PEAK VEL: 0.96 M/S
DOP CALC LVOT STROKE INDEX: 43.4 ML/M2
DOP CALC LVOT STROKE VOLUME: 85
E WAVE DECELERATION TIME: 237 MS
E/A RATIO: 0.72
FRACTIONAL SHORTENING: 36 (ref 28–44)
INTERVENTRICULAR SEPTUM IN DIASTOLE (PARASTERNAL SHORT AXIS VIEW): 1.5 CM
INTERVENTRICULAR SEPTUM: 1.5 CM (ref 0.6–1.1)
LEFT ATRIUM SIZE: 3.8 CM
LEFT INTERNAL DIMENSION IN SYSTOLE: 3.2 CM (ref 2.1–4)
LEFT VENTRICULAR INTERNAL DIMENSION IN DIASTOLE: 5 CM (ref 3.5–6)
LEFT VENTRICULAR POSTERIOR WALL IN END DIASTOLE: 1.1 CM
LEFT VENTRICULAR STROKE VOLUME: 75 ML
LV EF US.2D.A4C+ESTIMATED: 64 %
LVSV (TEICH): 75 ML
MV E'TISSUE VEL-LAT: 6 CM/S
MV E'TISSUE VEL-SEP: 6 CM/S
MV PEAK A VEL: 0.99 M/S
MV PEAK E VEL: 71 CM/S
SL CV LV EF: 55
SL CV PED ECHO LEFT VENTRICLE DIASTOLIC VOLUME (MOD BIPLANE) 2D: 116 ML
SL CV PED ECHO LEFT VENTRICLE SYSTOLIC VOLUME (MOD BIPLANE) 2D: 41 ML
TR MAX PG: 19 MMHG
TR PEAK VELOCITY: 2.2 M/S
TRICUSPID ANNULAR PLANE SYSTOLIC EXCURSION: 1.6 CM
TRICUSPID VALVE PEAK REGURGITATION VELOCITY: 2.17 M/S

## 2025-05-23 PROCEDURE — 93306 TTE W/DOPPLER COMPLETE: CPT

## 2025-05-23 PROCEDURE — 93226 XTRNL ECG REC<48 HR SCAN A/R: CPT

## 2025-05-23 PROCEDURE — 93225 XTRNL ECG REC<48 HRS REC: CPT

## 2025-05-24 ENCOUNTER — NURSE TRIAGE (OUTPATIENT)
Dept: OTHER | Facility: OTHER | Age: 85
End: 2025-05-24

## 2025-05-24 NOTE — TELEPHONE ENCOUNTER
"Regarding: Question and concerns  ----- Message from Emmy MCKEON sent at 5/24/2025  4:46 PM EDT -----  \"I had a halter heart monitor put on at 2:00 on Friday . I also wanted to know if I could mow the lawn.\"    "

## 2025-05-24 NOTE — TELEPHONE ENCOUNTER
"FOLLOW UP: Please follow up with pt regarding Holter Monitor questions on Tuesday.     REASON FOR CONVERSATION: No Triage Call    SYMPTOMS: Pt denies cardiac related symptoms. Pt reports feeling normal.     OTHER: Pt called to inquire how long he has to wear Holter Monitor and if he could mow lawn via riding  this weekend while wearing it.     DISPOSITION:  When Office is Open (overriding Call PCP When Office is Open)    Pt advised to reach out to Cardiology office when open on Tuesday 05/27/25.      Reason for Disposition   [1] Caller requesting NON-URGENT health information AND [2] PCP's office is the best resource    Answer Assessment - Initial Assessment Questions  1. REASON FOR CALL: \"What is the main reason for your call?\" or \"How can I best help you?\"        Pt would like to know how long he has to wear holter monitor and if he could mow the line (riding ).   2. SYMPTOMS : \"Do you have any symptoms?\"         Pt currently does not have any symptoms. Pt denies palpations and shortness of breath.     3. OTHER QUESTIONS: \"Do you have any other questions?\"    Protocols used: Information Only Call - No Triage-Adult-    "

## 2025-06-10 DIAGNOSIS — I10 BENIGN ESSENTIAL HYPERTENSION: ICD-10-CM

## 2025-06-10 DIAGNOSIS — R07.9 CHEST PAIN, UNSPECIFIED TYPE: ICD-10-CM

## 2025-06-10 RX ORDER — NITROGLYCERIN 0.4 MG/1
0.4 TABLET SUBLINGUAL
Qty: 25 TABLET | Refills: 0 | Status: SHIPPED | OUTPATIENT
Start: 2025-06-10

## 2025-06-10 RX ORDER — AMLODIPINE BESYLATE 2.5 MG/1
2.5 TABLET ORAL
Qty: 90 TABLET | Refills: 1 | Status: SHIPPED | OUTPATIENT
Start: 2025-06-10

## 2025-06-10 NOTE — TELEPHONE ENCOUNTER
Patient called to request a refill for their  Amlodipine 2.5mg advised a refill was requested on 6/10/25 and is pending approval. Patient verbalized understanding and is in agreement.     Does the patient have enough for 3 days?   [] Yes   [x] No - Send as HP to POD

## 2025-06-10 NOTE — TELEPHONE ENCOUNTER
Reason for call:   [x] Refill   [] Prior Auth  [] Other:     Office:   [x] PCP/Provider - DO ALEXI Kaminski   [] Specialty/Provider -     Medication: nitroglycerin (NITROSTAT)     Dose/Frequency: 0.4mg     Every 5 minutes PRN chest pain    Quantity: 25    Pharmacy: [id number]    Local Pharmacy   Does the patient have enough for 3 days?   [] Yes   [x] No - Send as HP to POD    Mail Away Pharmacy   Does the patient have enough for 10 days?   [] Yes   [] No - Send as HP to POD

## 2025-06-11 DIAGNOSIS — I10 BENIGN ESSENTIAL HYPERTENSION: ICD-10-CM

## 2025-06-12 RX ORDER — VALSARTAN AND HYDROCHLOROTHIAZIDE 320; 25 MG/1; MG/1
1 TABLET, FILM COATED ORAL
Qty: 90 TABLET | Refills: 1 | Status: SHIPPED | OUTPATIENT
Start: 2025-06-12

## 2025-06-30 ENCOUNTER — HOSPITAL ENCOUNTER (OUTPATIENT)
Dept: NUCLEAR MEDICINE | Facility: HOSPITAL | Age: 85
Discharge: HOME | End: 2025-06-30
Attending: STUDENT IN AN ORGANIZED HEALTH CARE EDUCATION/TRAINING PROGRAM
Payer: COMMERCIAL

## 2025-06-30 ENCOUNTER — HOSPITAL ENCOUNTER (OUTPATIENT)
Dept: NON INVASIVE DIAGNOSTICS | Facility: HOSPITAL | Age: 85
Discharge: HOME | End: 2025-06-30
Attending: STUDENT IN AN ORGANIZED HEALTH CARE EDUCATION/TRAINING PROGRAM
Payer: COMMERCIAL

## 2025-06-30 DIAGNOSIS — I25.10 ARTERIOSCLEROSIS OF CORONARY ARTERY: ICD-10-CM

## 2025-06-30 DIAGNOSIS — I10 BENIGN ESSENTIAL HYPERTENSION: ICD-10-CM

## 2025-06-30 DIAGNOSIS — Z01.810 PREOP CARDIOVASCULAR EXAM: ICD-10-CM

## 2025-06-30 LAB
CHEST PAIN STATEMENT: NORMAL
MAX DIASTOLIC BP: 80 MMHG
MAX HR PERCENT: 95 %
MAX HR: 131 BPM
MAX PREDICTED HEART RATE: 137 BPM
PROTOCOL NAME: NORMAL
RATE PRESSURE PRODUCT: NORMAL
SL CV REST NUCLEAR ISOTOPE DOSE: 10.8 MCI
SL CV STRESS NUCLEAR ISOTOPE DOSE: 30 MCI
SL CV STRESS RECOVERY BP: NORMAL MMHG
SL CV STRESS RECOVERY HR: 94 BPM
SL CV STRESS RECOVERY O2 SAT: 99 %
SL CV STRESS STAGE REACHED: 2
SPECT HRT GATED+EF W RNC IV: 52 %
STRESS ANGINA INDEX: 0
STRESS BASELINE BP: NORMAL MMHG
STRESS BASELINE HR: 68 BPM
STRESS O2 SAT REST: 99 %
STRESS PEAK HR: 131 BPM
STRESS POST ESTIMATED WORKLOAD: 5.3 METS
STRESS POST EXERCISE DUR MIN: 4 MIN
STRESS POST EXERCISE DUR MIN: 4 MIN
STRESS POST EXERCISE DUR SEC: 10 SEC
STRESS POST EXERCISE DUR SEC: 10 SEC
STRESS POST O2 SAT PEAK: 96 %
STRESS POST PEAK BP: 186 MMHG
STRESS POST PEAK HR: 131 BPM
STRESS POST PEAK SYSTOLIC BP: 186 MMHG
STRESS/REST PERFUSION RATIO: 0.93
TARGET HR FORMULA: NORMAL
TEST INDICATION: NORMAL

## 2025-06-30 PROCEDURE — 78452 HT MUSCLE IMAGE SPECT MULT: CPT

## 2025-06-30 PROCEDURE — 93018 CV STRESS TEST I&R ONLY: CPT

## 2025-06-30 PROCEDURE — A9502 TC99M TETROFOSMIN: HCPCS

## 2025-06-30 PROCEDURE — 93016 CV STRESS TEST SUPVJ ONLY: CPT

## 2025-06-30 PROCEDURE — 93017 CV STRESS TEST TRACING ONLY: CPT

## 2025-07-07 ENCOUNTER — OFFICE VISIT (OUTPATIENT)
Age: 85
End: 2025-07-07
Payer: COMMERCIAL

## 2025-07-07 VITALS
DIASTOLIC BLOOD PRESSURE: 76 MMHG | TEMPERATURE: 98.2 F | HEART RATE: 60 BPM | SYSTOLIC BLOOD PRESSURE: 118 MMHG | HEIGHT: 67 IN | WEIGHT: 185.2 LBS | BODY MASS INDEX: 29.07 KG/M2 | OXYGEN SATURATION: 96 %

## 2025-07-07 DIAGNOSIS — G31.84 MCI (MILD COGNITIVE IMPAIRMENT): Primary | ICD-10-CM

## 2025-07-07 DIAGNOSIS — E55.9 VITAMIN D DEFICIENCY: ICD-10-CM

## 2025-07-07 DIAGNOSIS — E11.22 TYPE 2 DIABETES MELLITUS WITH CHRONIC KIDNEY DISEASE, WITHOUT LONG-TERM CURRENT USE OF INSULIN, UNSPECIFIED CKD STAGE (HCC): ICD-10-CM

## 2025-07-07 DIAGNOSIS — Z71.89 ADVANCED CARE PLANNING/COUNSELING DISCUSSION: ICD-10-CM

## 2025-07-07 DIAGNOSIS — I10 BENIGN ESSENTIAL HYPERTENSION: ICD-10-CM

## 2025-07-07 DIAGNOSIS — R32 URINARY INCONTINENCE, UNSPECIFIED TYPE: ICD-10-CM

## 2025-07-07 DIAGNOSIS — N40.1 BENIGN PROSTATIC HYPERPLASIA WITH LOWER URINARY TRACT SYMPTOMS, SYMPTOM DETAILS UNSPECIFIED: ICD-10-CM

## 2025-07-07 DIAGNOSIS — R53.81 PHYSICAL DECONDITIONING: ICD-10-CM

## 2025-07-07 DIAGNOSIS — N18.30 STAGE 3 CHRONIC KIDNEY DISEASE, UNSPECIFIED WHETHER STAGE 3A OR 3B CKD (HCC): ICD-10-CM

## 2025-07-07 PROCEDURE — 99483 ASSMT & CARE PLN PT COG IMP: CPT | Performed by: INTERNAL MEDICINE

## 2025-07-07 NOTE — PROGRESS NOTES
[facility]  Follow-up/cognitive care planning  [address] [facility] [id number] [phone]    NAME: Gadiel Gallegos  AGE: 85 y.o. SEX: male    DATE OF ENCOUNTER: 7/7/2025    Assessment and Plan     Assessment & Plan  MCI (mild cognitive impairment)  MoCA today 21/30 (was 23/30 on 12/2/2024) with deficits in visual-spatial, delayed recall and orientation  GDS 4/15  Staging: Mild cognitive impairment (FAST stage III)  Patient advised to remain active physically, mentally and socially  Maintain chronic conditions under control  Speech and OT referral for cognitive therapy  Decision-making: At this time of the visit, it is my opinion that the patient is able to make his own medical decisions.  Driving safety concerns: Patient drives in no safety concerns reported at this time  Medications Review: Medications seem appropriate for present conditions. Check with PCP before using over the counter medications. Avoid over the counter medications that can affect cognition (e.g., Benadryl, Tylenol PM). Avoid NSAIDs due to risk of GI bleed and renal impairment.   Caregiver review: Patient is independent with ADLs and IADLs.  Wife is the primary caregiver and reports no caregiver issues at this time.  ACP review: Patient has a living will and POA (wife)  Follow-up in 6 months and repeat MoCA    Type 2 diabetes mellitus with chronic kidney disease, without long-term current use of insulin, unspecified CKD stage (HCC)    Lab Results   Component Value Date    HGBA1C 6.5 (H) 04/22/2025     Diet control  Continue follow-up with PCP  Benign essential hypertension  BP stable  Continue valsartan hydrochlorothiazide 320-25 mg daily and amlodipine 2.5 mg daily  Managed by PCP    Benign prostatic hyperplasia with lower urinary tract symptoms, symptom details unspecified  Currently on tamsulosin 0.5 mg daily, finasteride 5 mg daily  Continue follow-up with urology    Stage 3 chronic kidney disease, unspecified whether  stage 3a or 3b CKD ([facility])  Lab Results   Component Value Date    EGFR 45 04/22/2025    EGFR 44 10/17/2023    EGFR 41 05/10/2023    CREATININE 1.42 (H) 04/22/2025    CREATININE 1.47 (H) 10/17/2023    CREATININE 1.54 (H) 05/10/2023     Maintain adequate hydration  Avoid nephrotoxic agent  Monitor renal function    Urinary incontinence, unspecified type  Currently on oxybutynin 10 mg daily    Vitamin D deficiency  Continue vitamin D supplement    Physical deconditioning  TUG 12 seconds  Fall precautions  Refer to medical fitness program  Silver sneaker and [facility] information provided  Orders:    Ambulatory Referral to Medical Fitness Exercise Specialist; Future    Advanced care planning/counseling discussion  .  She has a living well and POA (wife)      Chief Complaint     Patient is here today for follow up of memory issues and chronic conditions    History of Present Illness     Gadiel Gallegos who is a 85 y.o. male was seen in Geriatric follow-up today for memory difficulties and was accompanied by wife (Emerald Mcclellan).  Patient was last seen on 12/2/2024 for a follow-up visit for mild cognitive impairment and scored 20/30 on MoCA.  Patient states that his memory is about the same, his memory issues mainly short-term.  He does not have difficulties with names or finding words.  He is independent with ADLs and IADLs.  Wife manages the couples finances.  He drives and no accidents reported recently.  He does not get lost in familiar places while driving and wife reports no safety concerns.  Patient does not wander and no history of visual or auditory hallucinations.  He does not use assistive devices and no recent history of falls.  Patient states that he feels physically weak.  Reports fair appetite and no trouble sleeping.    The following portions of the patient's history were reviewed and updated as appropriate: allergies, current medications, past family history, past medical history, past social  history, past surgical history and problem list.     FUNCTIONAL STATUS:  ADLs  Does patient require assistance with:  Bathing:   No  Dressing:  No  Transferring:  No   Continence:   No  Toileting:  No  Feeding:  No     IADLs  Dose patient require assistance with:  Telephone:  No  Shopping:  No  Food Preparation:  No  Housekeeping:  No  Laundry:  No  Transportation:  No  Medications:  No  Finances:  Yes     NEUROPSYCH SYMPTOMS:  Does patient get angry or hostile?  Resist care from others?  No  Does patient see or hear things that no one else can see or hear?  No  Does patient act impatient and cranky? Does mood frequently change for no apparent reason?  No  Does patient act suspicious without good reason (example: believes that others are stealing from him or her, or planning to harm him or her in some way)?  No  Does patient less interested in his or her usual activities or in the activities and plans of others?  No  Does patient have trouble sleeping at night?  No  Dose patient have abnormal movements while asleep?  No     SAFETY:  Hearing and vision issue: Yes, wears glasses Yes.  No history of hearing loss  Any gait or balance disorder:  No  Uses: Does not use assistive devices  Any falls in the last year:  No  Any history of wandering:  No  Are there firearms or guns in the home:  Yes  Does patient drive:  Yes  Any driving accidents or citations in the last year:  No  Any concerns about patient's ability to drive:  No     ACP REVIEW:  Does patient have POA:  Yes  Does patient have a Living will:  Yes  Any legal assistance needed for healthcare planning?:  No   Review of Systems     Constitutional: Negative for activity change.   HENT: Negative for hearing loss and trouble swallowing.    Eyes: Negative for visual disturbance.   Respiratory: Negative for choking and shortness of breath.    Gastrointestinal: Negative for nausea.   Genitourinary: Negative for dysuria and frequency.   Musculoskeletal: Negative for  "back pain and neck pain.   Neurological: Negative for dizziness, facial asymmetry, speech difficulty, weakness and light-headedness.   Psychiatric/Behavioral: Negative for behavioral problems and confusion.      Objective     /76 (BP Location: Left arm, Patient Position: Sitting, Cuff Size: Standard)   Pulse 60   Temp 98.2 °F (36.8 °C) (Temporal)   Ht 5' 7\" (1.702 m)   Wt 84 kg (185 lb 3.2 oz)   SpO2 96%   BMI 29.01 kg/m²     Physical Exam  Vitals and nursing note reviewed.   Constitutional:       Appearance: Normal appearance  HENT:      Head: Normocephalic and atraumatic.   Ears:     External ear normal.   Eyes:      Extraocular Movements: Extraocular movements intact.      Conjunctiva/sclera: Conjunctivae normal.      Pupils: Pupils are equal, round, and reactive to light.   Mouth/Throat:     Oropharynx is clear and moist. No oropharyngeal exudate.   Neck:    Normal range of motion. Neck supple. No JVD present. No tracheal deviation present. No thyromegaly present.   Cardiovascular:      Rate and Rhythm: Normal rate.      Pulses: Normal pulses.      Heart sounds: Normal heart sounds. No murmur heard.  Pulmonary:      Effort: Pulmonary effort is normal. No respiratory distress.      Breath sounds: Normal breath sounds. Breath sounds: No wheezing or rales.  Abdominal:      General: Abdomen is flat.      Palpations: Abdomen is soft.   Musculoskeletal:      Cervical back: Normal range of motion and neck supple.   Skin:     General: Skin is warm and dry.   Neurological:      Mental Status: alert and oriented to person, place, and time.      Cranial Nerves: No cranial nerve deficit, dysarthria or facial asymmetry.      Sensory: Sensation is intact.      Motor: No weakness, or atrophy      Gait: Gait is intact.      MoCA 21/30    TUG 12 s   Psychiatric:     Mood and Affect: Mood normal.     Behavior: Behavior normal.     GDS 4/15    Pertinent Laboratory/Diagnostic Studies:  I have personally reviewed lab " results from 4/22/2025 including  CBC: WBC 5.92, Hb 15.0, HCT 45.8 and platelets 182  CMP: , K3.9, BUN 24, creatinine 1.42 and GFR 45  A1c 6.5 and TSH 1.56  Lipid profile: Total cholesterol 171, HDL 48 and   A1c 6.5    Current Medications   Current Medications[1]  Current medications reviewed         [1]   Current Outpatient Medications:     acetaminophen (TYLENOL) 325 mg tablet, Take 650 mg by mouth every 6 (six) hours as needed for mild pain, Disp: , Rfl:     amLODIPine (NORVASC) 2.5 mg tablet, TAKE 1 TABLET BY MOUTH ONCE DAILY AT BEDTIME, Disp: 90 tablet, Rfl: 1    aspirin (ECOTRIN) 325 mg EC tablet, Take 1 tablet by mouth, Disp: , Rfl:     cholecalciferol (VITAMIN D3) 1,000 units tablet, Take by mouth, Disp: , Rfl:     finasteride (PROSCAR) 5 mg tablet, Take 1 tablet (5 mg total) by mouth daily, Disp: 90 tablet, Rfl: 1    nitroglycerin (NITROSTAT) 0.4 mg SL tablet, Place 1 tablet (0.4 mg total) under the tongue every 5 (five) minutes as needed for chest pain, Disp: 25 tablet, Rfl: 0    Omega-3 Fatty Acids (OMEGA 3 PO), Take 2 capsules by mouth in the morning., Disp: , Rfl:     oxybutynin (DITROPAN-XL) 10 MG 24 hr tablet, Take 1 tablet (10 mg total) by mouth daily at bedtime, Disp: 90 tablet, Rfl: 3    Red Yeast Rice Extract (RED YEAST RICE PO), Take 2 capsules by mouth in the morning., Disp: , Rfl:     tamsulosin (FLOMAX) 0.4 mg, Take 1 capsule (0.4 mg total) by mouth 2 (two) times a day, Disp: 180 capsule, Rfl: 1    valsartan-hydrochlorothiazide (DIOVAN-HCT) 320-25 MG per tablet, TAKE 1 TABLET BY MOUTH AT BEDTIME, Disp: 90 tablet, Rfl: 1    fluticasone (FLONASE) 50 mcg/act nasal spray, 2 sprays into each nostril daily for 30 days (Patient taking differently: 2 sprays into each nostril if needed AS NEEDED), Disp: 1 Bottle, Rfl: 1    ketoconazole (NIZORAL) 2 % cream, , Disp: , Rfl: 0

## 2025-07-07 NOTE — ASSESSMENT & PLAN NOTE
BP stable  Continue valsartan hydrochlorothiazide 320-25 mg daily and amlodipine 2.5 mg daily  Managed by PCP

## 2025-07-07 NOTE — ASSESSMENT & PLAN NOTE
TUG 12 seconds  Fall precautions  Refer to medical fitness program  Silver sneaker and [facility] information provided  Orders:    Ambulatory Referral to Medical Fitness Exercise Specialist; Future

## 2025-07-07 NOTE — ASSESSMENT & PLAN NOTE
Lab Results   Component Value Date    HGBA1C 6.5 (H) 04/22/2025     Diet control  Continue follow-up with PCP

## 2025-07-07 NOTE — PROGRESS NOTES
[facility]  [address]  [place] [address]  [phone]    Social Work Follow-Up    LSW met with patient's spouse to provided the following resources for staying active per provider request:    -Silver Sneaker program  -[facility] List    LSW to remain available as needed.

## 2025-07-07 NOTE — ASSESSMENT & PLAN NOTE
Lab Results   Component Value Date    EGFR 45 04/22/2025    EGFR 44 10/17/2023    EGFR 41 05/10/2023    CREATININE 1.42 (H) 04/22/2025    CREATININE 1.47 (H) 10/17/2023    CREATININE 1.54 (H) 05/10/2023     Maintain adequate hydration  Avoid nephrotoxic agent  Monitor renal function

## 2025-07-07 NOTE — ASSESSMENT & PLAN NOTE
MoCA today 21/30 (was 23/30 on 12/2/2024) with deficits in visual-spatial, delayed recall and orientation  GDS 4/15  Staging: Mild cognitive impairment (FAST stage III)  Patient advised to remain active physically, mentally and socially  Maintain chronic conditions under control  Speech and OT referral for cognitive therapy  Decision-making: At this time of the visit, it is my opinion that the patient is able to make his own medical decisions.  Driving safety concerns: Patient drives in no safety concerns reported at this time  Medications Review: Medications seem appropriate for present conditions. Check with PCP before using over the counter medications. Avoid over the counter medications that can affect cognition (e.g., Benadryl, Tylenol PM). Avoid NSAIDs due to risk of GI bleed and renal impairment.   Caregiver review: Patient is independent with ADLs and IADLs.  Wife is the primary caregiver and reports no caregiver issues at this time.  ACP review: Patient has a living will and POA (wife)  Follow-up in 6 months and repeat MoCA

## 2025-07-09 ENCOUNTER — TELEPHONE (OUTPATIENT)
Age: 85
End: 2025-07-09

## 2025-07-09 NOTE — TELEPHONE ENCOUNTER
Please call patient to notify him that his stress test was negative for ischemia or significant blockages.  It did show considerable amount of extra beats.  I would recommend following up with his cardiologist as directed

## 2025-07-09 NOTE — TELEPHONE ENCOUNTER
Patient called in regards to wanting provider to review his myocardial perfusion spect results. Patient would like a call back.

## 2025-07-09 NOTE — TELEPHONE ENCOUNTER
Left a voicemail for patient of providers message in detail and if patient has any questions or concerns to return my call.

## 2025-07-11 ENCOUNTER — TELEPHONE (OUTPATIENT)
Age: 85
End: 2025-07-11

## 2025-07-11 NOTE — TELEPHONE ENCOUNTER
Caller: Gadiel Gallegos    Doctor: Dr. Hopkins    Reason for call: Pt completed stress test and PCP told pt to call Cardio for results. Pt needs CC?    Call back#: [phone]

## 2025-07-14 NOTE — TELEPHONE ENCOUNTER
Placed call to patient. No answer. Left vm for patient to return call.     Ok to discuss with CTS.

## 2025-07-14 NOTE — TELEPHONE ENCOUNTER
Called pt and spoke with him. Informed him that dr mccullough said he stress test looked okay and he is set to have the dental implants. Pt expressed understanding and he will let us know if he needs to send a clearance letter.

## 2025-07-14 NOTE — TELEPHONE ENCOUNTER
Caller: Gadiel     Doctor: Dr. Hopkins     Reason for call: Patient called due to a message he had received regarding his stress test results. CTS was unavailable at the time.  Please call patient back with results.     Call back#: [phone]

## 2025-07-16 NOTE — TELEPHONE ENCOUNTER
Patient called and is asking for his stress test report be mailed to him so that he has a physical copy on hand. I confirmed patients address is listed in the chart is up to date and correct. Please mail patients stress test report.

## 2025-07-23 ENCOUNTER — NURSE TRIAGE (OUTPATIENT)
Dept: OTHER | Facility: OTHER | Age: 85
End: 2025-07-23

## 2025-07-23 DIAGNOSIS — N40.1 BENIGN PROSTATIC HYPERPLASIA WITH LOWER URINARY TRACT SYMPTOMS, SYMPTOM DETAILS UNSPECIFIED: ICD-10-CM

## 2025-07-23 RX ORDER — FINASTERIDE 5 MG/1
5 TABLET, FILM COATED ORAL DAILY
Qty: 7 TABLET | Refills: 0 | Status: SHIPPED | OUTPATIENT
Start: 2025-07-23

## 2025-07-23 NOTE — TELEPHONE ENCOUNTER
"REASON FOR CONVERSATION: Medication Refill    SYMPTOMS: No symptoms but patient is completely out of Finasteride 5mg tablets. Would like the medication refill sent to the [facility] pharmacy in [place].    OTHER HEALTH INFORMATION: N/A    PROTOCOL DISPOSITION: Home Care    CARE ADVICE PROVIDED: Advised that a 7 day supple will be sent to the [facility] pharmacy requested, and to follow up with PCP for remaining refill. Patient stated that he would like to make a follow up appointment, but will call the office tomorrow as he does not have appointment book handy.    PRACTICE FOLLOW-UP: Follow up with PCP office          Reason for Disposition   Pharmacy with prescription refill question and triager answers question    Answer Assessment - Initial Assessment Questions  1. DRUG NAME: \"What medicine do you need to have refilled?\"        Finasteride 5 mg    2. REFILLS REMAINING: \"How many refills are remaining?\" (Note: The label on the medicine or pill bottle will show how many refills are remaining. If there are no refills remaining, then a renewal may be needed.)        0    3. EXPIRATION DATE: \"What is the expiration date?\" (Note: The label states when the prescription will , and thus can no longer be refilled.)        N/A    4. PRESCRIBING HCP: \"Who prescribed it?\" Reason: If prescribed by specialist, call should be referred to that group.         Dr. Dumont    5. SYMPTOMS: \"Do you have any symptoms?\"        None    Protocols used: Medication Refill and Renewal Call-Adult-    "

## 2025-07-23 NOTE — TELEPHONE ENCOUNTER
"Regarding: finasteride med refill  ----- Message from Marifer AYON sent at 7/23/2025  5:44 PM EDT -----  Patient stated, \"I am completely out of Finasteride 5 mg, I dont have any to take for tonight.\"   Pharmacy: [facility]/pharmacy #[id number] - [place] - [address]/, [place] [address]  Phone: [phone]    "

## 2025-07-24 RX ORDER — FINASTERIDE 5 MG/1
5 TABLET, FILM COATED ORAL DAILY
Qty: 90 TABLET | Refills: 1 | OUTPATIENT
Start: 2025-07-24

## 2025-08-11 ENCOUNTER — TELEPHONE (OUTPATIENT)
Age: 85
End: 2025-08-11

## 2025-08-15 ENCOUNTER — OFFICE VISIT (OUTPATIENT)
Dept: CARDIOLOGY CLINIC | Facility: CLINIC | Age: 85
End: 2025-08-15
Payer: COMMERCIAL

## 2025-08-16 ENCOUNTER — TELEPHONE (OUTPATIENT)
Dept: OTHER | Facility: OTHER | Age: 85
End: 2025-08-16

## 2025-08-18 ENCOUNTER — TELEPHONE (OUTPATIENT)
Age: 85
End: 2025-08-18

## 2025-08-19 DIAGNOSIS — Z01.818 PRE-OP EXAMINATION: Primary | ICD-10-CM

## 2025-08-22 ENCOUNTER — TELEPHONE (OUTPATIENT)
Dept: ADMINISTRATIVE | Facility: OTHER | Age: 85
End: 2025-08-22

## (undated) DEVICE — GLOVE SRG BIOGEL 8

## (undated) DEVICE — TUBING SUCTION 5MM X 12 FT

## (undated) DEVICE — UROCATCH BAG

## (undated) DEVICE — REM POLYHESIVE ADULT PATIENT RETURN ELECTRODE: Brand: VALLEYLAB

## (undated) DEVICE — EVACUATOR BLADDER ELLIK DISP STRL

## (undated) DEVICE — PACK TUR

## (undated) DEVICE — SCD SEQUENTIAL COMPRESSION COMFORT SLEEVE MEDIUM KNEE LENGTH: Brand: KENDALL SCD

## (undated) DEVICE — BAG URINE DRAINAGE 2000ML ANTI RFLX LF

## (undated) DEVICE — BASIC SINGLE BASIN-LF: Brand: MEDLINE INDUSTRIES, INC.

## (undated) DEVICE — NEEDLE 18 G X 1 1/2

## (undated) DEVICE — GLOVE INDICATOR PI UNDERGLOVE SZ 8 BLUE

## (undated) DEVICE — LUBRICANT SURGILUBE TUBE 4 OZ  FLIP TOP

## (undated) DEVICE — CATH FOLEY COUDE 16FR 5ML 2 WAY TIEMANN LUBRICATH